# Patient Record
Sex: FEMALE | Race: BLACK OR AFRICAN AMERICAN | NOT HISPANIC OR LATINO | URBAN - METROPOLITAN AREA
[De-identification: names, ages, dates, MRNs, and addresses within clinical notes are randomized per-mention and may not be internally consistent; named-entity substitution may affect disease eponyms.]

---

## 2023-07-11 ENCOUNTER — INPATIENT (INPATIENT)
Facility: HOSPITAL | Age: 43
LOS: 44 days | Discharge: NURSING, REHAB OR LONG-TERM CARE | End: 2023-08-25
Attending: INTERNAL MEDICINE | Admitting: INTERNAL MEDICINE
Payer: MEDICAID

## 2023-07-11 VITALS
SYSTOLIC BLOOD PRESSURE: 137 MMHG | TEMPERATURE: 98 F | OXYGEN SATURATION: 98 % | HEART RATE: 101 BPM | RESPIRATION RATE: 16 BRPM | DIASTOLIC BLOOD PRESSURE: 104 MMHG

## 2023-07-11 PROCEDURE — 99285 EMERGENCY DEPT VISIT HI MDM: CPT

## 2023-07-11 NOTE — ED ADULT TRIAGE NOTE - CHIEF COMPLAINT QUOTE
Pt. brought to [facility] ED by [facility] from home for general weakness of bilateral legs with episodes of falling. Was just diagnosed with neuropathy in March. Was on gabapentin. Pt. fell today. Fell 3 times in last week. No other medical history. Ambulance picked up pt. off of floor. Pt. was on floor for 45 minutes. Pt. feels like soreness in chest. EKG requested at triage. FS 89.

## 2023-07-12 DIAGNOSIS — G62.9 POLYNEUROPATHY, UNSPECIFIED: ICD-10-CM

## 2023-07-12 DIAGNOSIS — R29.898 OTHER SYMPTOMS AND SIGNS INVOLVING THE MUSCULOSKELETAL SYSTEM: ICD-10-CM

## 2023-07-12 DIAGNOSIS — Z29.9 ENCOUNTER FOR PROPHYLACTIC MEASURES, UNSPECIFIED: ICD-10-CM

## 2023-07-12 DIAGNOSIS — M54.9 DORSALGIA, UNSPECIFIED: ICD-10-CM

## 2023-07-12 DIAGNOSIS — R07.9 CHEST PAIN, UNSPECIFIED: ICD-10-CM
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
## 2023-07-12 DIAGNOSIS — E87.6 HYPOKALEMIA: ICD-10-CM

## 2023-07-12 DIAGNOSIS — Z90.49 ACQUIRED ABSENCE OF OTHER SPECIFIED PARTS OF DIGESTIVE TRACT: Chronic | ICD-10-CM

## 2023-07-12 LAB
ALBUMIN SERPL ELPH-MCNC: 3.7 G/DL — SIGNIFICANT CHANGE UP (ref 3.3–5)
ALP SERPL-CCNC: 63 U/L — SIGNIFICANT CHANGE UP (ref 40–120)
ALT FLD-CCNC: 86 U/L — HIGH (ref 4–33)
ANION GAP SERPL CALC-SCNC: 15 MMOL/L — HIGH (ref 7–14)
ANION GAP SERPL CALC-SCNC: 15 MMOL/L — HIGH (ref 7–14)
AST SERPL-CCNC: 159 U/L — HIGH (ref 4–32)
BASOPHILS # BLD AUTO: 0.03 K/UL — SIGNIFICANT CHANGE UP (ref 0–0.2)
BASOPHILS NFR BLD AUTO: 0.7 % — SIGNIFICANT CHANGE UP (ref 0–2)
BILIRUB SERPL-MCNC: 0.9 MG/DL — SIGNIFICANT CHANGE UP (ref 0.2–1.2)
BUN SERPL-MCNC: 8 MG/DL — SIGNIFICANT CHANGE UP (ref 7–23)
BUN SERPL-MCNC: 9 MG/DL — SIGNIFICANT CHANGE UP (ref 7–23)
CALCIUM SERPL-MCNC: 9.2 MG/DL — SIGNIFICANT CHANGE UP (ref 8.4–10.5)
CALCIUM SERPL-MCNC: 9.7 MG/DL — SIGNIFICANT CHANGE UP (ref 8.4–10.5)
CHLORIDE SERPL-SCNC: 96 MMOL/L — LOW (ref 98–107)
CHLORIDE SERPL-SCNC: 99 MMOL/L — SIGNIFICANT CHANGE UP (ref 98–107)
CK SERPL-CCNC: 174 U/L — HIGH (ref 25–170)
CK SERPL-CCNC: 222 U/L — HIGH (ref 25–170)
CO2 SERPL-SCNC: 27 MMOL/L — SIGNIFICANT CHANGE UP (ref 22–31)
CO2 SERPL-SCNC: 31 MMOL/L — SIGNIFICANT CHANGE UP (ref 22–31)
CREAT SERPL-MCNC: 0.53 MG/DL — SIGNIFICANT CHANGE UP (ref 0.5–1.3)
CREAT SERPL-MCNC: 0.6 MG/DL — SIGNIFICANT CHANGE UP (ref 0.5–1.3)
CRP SERPL-MCNC: 10 MG/L — HIGH
EGFR: 114 ML/MIN/1.73M2 — SIGNIFICANT CHANGE UP
EGFR: 118 ML/MIN/1.73M2 — SIGNIFICANT CHANGE UP
EOSINOPHIL # BLD AUTO: 0.04 K/UL — SIGNIFICANT CHANGE UP (ref 0–0.5)
EOSINOPHIL NFR BLD AUTO: 0.9 % — SIGNIFICANT CHANGE UP (ref 0–6)
FOLATE SERPL-MCNC: 7.7 NG/ML — SIGNIFICANT CHANGE UP (ref 3.1–17.5)
GLUCOSE SERPL-MCNC: 102 MG/DL — HIGH (ref 70–99)
GLUCOSE SERPL-MCNC: 90 MG/DL — SIGNIFICANT CHANGE UP (ref 70–99)
HCG SERPL-ACNC: <1 MIU/ML — SIGNIFICANT CHANGE UP
HCT VFR BLD CALC: 34.7 % — SIGNIFICANT CHANGE UP (ref 34.5–45)
HCT VFR BLD CALC: 38.1 % — SIGNIFICANT CHANGE UP (ref 34.5–45)
HGB BLD-MCNC: 11.3 G/DL — LOW (ref 11.5–15.5)
HGB BLD-MCNC: 12.3 G/DL — SIGNIFICANT CHANGE UP (ref 11.5–15.5)
IANC: 2.49 K/UL — SIGNIFICANT CHANGE UP (ref 1.8–7.4)
IMM GRANULOCYTES NFR BLD AUTO: 0.2 % — SIGNIFICANT CHANGE UP (ref 0–0.9)
LYMPHOCYTES # BLD AUTO: 1.47 K/UL — SIGNIFICANT CHANGE UP (ref 1–3.3)
LYMPHOCYTES # BLD AUTO: 32.8 % — SIGNIFICANT CHANGE UP (ref 13–44)
MAGNESIUM SERPL-MCNC: 1.3 MG/DL — LOW (ref 1.6–2.6)
MCHC RBC-ENTMCNC: 28.2 PG — SIGNIFICANT CHANGE UP (ref 27–34)
MCHC RBC-ENTMCNC: 28.9 PG — SIGNIFICANT CHANGE UP (ref 27–34)
MCHC RBC-ENTMCNC: 32.3 GM/DL — SIGNIFICANT CHANGE UP (ref 32–36)
MCHC RBC-ENTMCNC: 32.6 GM/DL — SIGNIFICANT CHANGE UP (ref 32–36)
MCV RBC AUTO: 86.5 FL — SIGNIFICANT CHANGE UP (ref 80–100)
MCV RBC AUTO: 89.6 FL — SIGNIFICANT CHANGE UP (ref 80–100)
MONOCYTES # BLD AUTO: 0.44 K/UL — SIGNIFICANT CHANGE UP (ref 0–0.9)
MONOCYTES NFR BLD AUTO: 9.8 % — SIGNIFICANT CHANGE UP (ref 2–14)
NEUTROPHILS # BLD AUTO: 2.49 K/UL — SIGNIFICANT CHANGE UP (ref 1.8–7.4)
NEUTROPHILS NFR BLD AUTO: 55.6 % — SIGNIFICANT CHANGE UP (ref 43–77)
NRBC # BLD: 0 /100 WBCS — SIGNIFICANT CHANGE UP (ref 0–0)
NRBC # BLD: 0 /100 WBCS — SIGNIFICANT CHANGE UP (ref 0–0)
NRBC # FLD: 0 K/UL — SIGNIFICANT CHANGE UP (ref 0–0)
NRBC # FLD: 0 K/UL — SIGNIFICANT CHANGE UP (ref 0–0)
PHOSPHATE SERPL-MCNC: 3.3 MG/DL — SIGNIFICANT CHANGE UP (ref 2.5–4.5)
PLATELET # BLD AUTO: 271 K/UL — SIGNIFICANT CHANGE UP (ref 150–400)
PLATELET # BLD AUTO: 280 K/UL — SIGNIFICANT CHANGE UP (ref 150–400)
POTASSIUM SERPL-MCNC: 2.9 MMOL/L — CRITICAL LOW (ref 3.5–5.3)
POTASSIUM SERPL-MCNC: 3.2 MMOL/L — LOW (ref 3.5–5.3)
POTASSIUM SERPL-SCNC: 2.9 MMOL/L — CRITICAL LOW (ref 3.5–5.3)
POTASSIUM SERPL-SCNC: 3.2 MMOL/L — LOW (ref 3.5–5.3)
PROT SERPL-MCNC: 6.9 G/DL — SIGNIFICANT CHANGE UP (ref 6–8.3)
RBC # BLD: 4.01 M/UL — SIGNIFICANT CHANGE UP (ref 3.8–5.2)
RBC # BLD: 4.25 M/UL — SIGNIFICANT CHANGE UP (ref 3.8–5.2)
RBC # FLD: 14.7 % — HIGH (ref 10.3–14.5)
RBC # FLD: 14.9 % — HIGH (ref 10.3–14.5)
SODIUM SERPL-SCNC: 141 MMOL/L — SIGNIFICANT CHANGE UP (ref 135–145)
SODIUM SERPL-SCNC: 142 MMOL/L — SIGNIFICANT CHANGE UP (ref 135–145)
TROPONIN T, HIGH SENSITIVITY RESULT: 17 NG/L — SIGNIFICANT CHANGE UP
TROPONIN T, HIGH SENSITIVITY RESULT: 23 NG/L — SIGNIFICANT CHANGE UP
TSH SERPL-MCNC: 2.38 UIU/ML — SIGNIFICANT CHANGE UP (ref 0.27–4.2)
VIT B12 SERPL-MCNC: 401 PG/ML — SIGNIFICANT CHANGE UP (ref 200–900)
WBC # BLD: 3.63 K/UL — LOW (ref 3.8–10.5)
WBC # BLD: 4.48 K/UL — SIGNIFICANT CHANGE UP (ref 3.8–10.5)
WBC # FLD AUTO: 3.63 K/UL — LOW (ref 3.8–10.5)
WBC # FLD AUTO: 4.48 K/UL — SIGNIFICANT CHANGE UP (ref 3.8–10.5)

## 2023-07-12 PROCEDURE — 71045 X-RAY EXAM CHEST 1 VIEW: CPT | Mod: 26

## 2023-07-12 PROCEDURE — 99223 1ST HOSP IP/OBS HIGH 75: CPT | Mod: GC

## 2023-07-12 RX ORDER — POTASSIUM CHLORIDE 20 MEQ
40 PACKET (EA) ORAL ONCE
Refills: 0 | Status: COMPLETED | OUTPATIENT
Start: 2023-07-12 | End: 2023-07-12

## 2023-07-12 RX ORDER — SODIUM CHLORIDE 9 MG/ML
1000 INJECTION INTRAMUSCULAR; INTRAVENOUS; SUBCUTANEOUS
Refills: 0 | Status: DISCONTINUED | OUTPATIENT
Start: 2023-07-12 | End: 2023-07-12

## 2023-07-12 RX ORDER — MAGNESIUM SULFATE 500 MG/ML
1 VIAL (ML) INJECTION ONCE
Refills: 0 | Status: COMPLETED | OUTPATIENT
Start: 2023-07-12 | End: 2023-07-12

## 2023-07-12 RX ORDER — ACETAMINOPHEN 500 MG
975 TABLET ORAL ONCE
Refills: 0 | Status: COMPLETED | OUTPATIENT
Start: 2023-07-12 | End: 2023-07-12

## 2023-07-12 RX ORDER — POTASSIUM CHLORIDE 20 MEQ
10 PACKET (EA) ORAL
Refills: 0 | Status: COMPLETED | OUTPATIENT
Start: 2023-07-12 | End: 2023-07-12

## 2023-07-12 RX ORDER — SODIUM CHLORIDE 9 MG/ML
1000 INJECTION INTRAMUSCULAR; INTRAVENOUS; SUBCUTANEOUS
Refills: 0 | Status: DISCONTINUED | OUTPATIENT
Start: 2023-07-12 | End: 2023-07-15

## 2023-07-12 RX ADMIN — Medication 100 MILLIEQUIVALENT(S): at 07:08

## 2023-07-12 RX ADMIN — Medication 40 MILLIEQUIVALENT(S): at 03:20

## 2023-07-12 RX ADMIN — Medication 975 MILLIGRAM(S): at 04:50

## 2023-07-12 RX ADMIN — Medication 40 MILLIEQUIVALENT(S): at 17:45

## 2023-07-12 RX ADMIN — Medication 100 MILLIEQUIVALENT(S): at 17:45

## 2023-07-12 RX ADMIN — Medication 100 MILLIEQUIVALENT(S): at 19:25

## 2023-07-12 RX ADMIN — Medication 100 MILLIEQUIVALENT(S): at 20:28

## 2023-07-12 RX ADMIN — Medication 975 MILLIGRAM(S): at 02:14

## 2023-07-12 RX ADMIN — Medication 100 MILLIEQUIVALENT(S): at 03:59

## 2023-07-12 RX ADMIN — Medication 100 GRAM(S): at 17:44

## 2023-07-12 RX ADMIN — Medication 100 MILLIEQUIVALENT(S): at 04:55

## 2023-07-12 RX ADMIN — SODIUM CHLORIDE 100 MILLILITER(S): 9 INJECTION INTRAMUSCULAR; INTRAVENOUS; SUBCUTANEOUS at 07:08

## 2023-07-12 NOTE — ED ADULT NURSE NOTE - OBJECTIVE STATEMENT
Break RN note- Patient arrives to the ED for weakness of bilateral legs. Patient diagnosed with peripheral neuropathy in March and was on gabapentin. Patient reports she uses a cane and walker at home. Patient fell today and was on the ground for 45 minutes before EMS arrived to pick her up. Patient reports multiple falls last week. Patient reports her bilateral hands are sensitive to touch with the left hand being more sensitive. No chest pain reported at time of assessment. Patient denies any other pain or injury. Cardiac monitor in place- sinus rhythm. 18g IV placed to right arm. Labs sent a ordered. Patient medicated as ordered. Safety maintained. Patient stable upon exiting the room.

## 2023-07-12 NOTE — PATIENT PROFILE ADULT - FALL HARM RISK - HARM RISK INTERVENTIONS
Assistance with ambulation/Communicate Risk of Fall with Harm to all staff/Discuss with provider need for PT consult/Monitor gait and stability/Reinforce activity limits and safety measures with patient and family/Review medications for side effects contributing to fall risk/Sit up slowly, dangle for a short time, stand at bedside before walking/Tailored Fall Risk Interventions/Visual Cue: Yellow wristband and red socks/Bed in lowest position, wheels locked, appropriate side rails in place/Call bell, personal items and telephone in reach/Instruct patient to call for assistance before getting out of bed or chair/Non-slip footwear when patient is out of bed/[place] to call system/Physically safe environment - no spills, clutter or unnecessary equipment/Purposeful Proactive Rounding/Room/bathroom lighting operational, light cord in reach

## 2023-07-12 NOTE — H&P ADULT - NSHPLABSRESULTS_GEN_ALL_CORE
-XR CHEST PORTABLE URGENT   PROCEDURE DATE:  07/12/2023      INTERPRETATION:  EXAMINATION: XR CHEST URGENT    CLINICAL INDICATION: Chest Pain    TECHNIQUE: Single frontal, portable view of the chest was obtained.    COMPARISON: None    FINDINGS:  The heart is normal in size.  The lungs are clear.  There is no pneumothorax or pleural effusion.    IMPRESSION:  Clear lungs.    --- End of Report ---        -EKG: NSR

## 2023-07-12 NOTE — ED PROVIDER NOTE - MDM ORDERS SUBMITTED SELECTION
Labs/Imaging Studies/Medications Cyclophosphamide Counseling:  I discussed with the patient the risks of cyclophosphamide including but not limited to hair loss, hormonal abnormalities, decreased fertility, abdominal pain, diarrhea, nausea and vomiting, bone marrow suppression and infection. The patient understands that monitoring is required while taking this medication.

## 2023-07-12 NOTE — H&P ADULT - NSHPPHYSICALEXAM_GEN_ALL_CORE
PHYSICAL EXAM:  GENERAL: NAD, lying in bed comfortably  HEAD:  Atraumatic, Normocephalic  EYES: EOMI, PERRLA, conjunctiva and sclera clear  ENT: Moist mucous membranes  NECK: Supple, No JVD  CHEST/LUNG: Clear to auscultation bilaterally; No rales, rhonchi, wheezing, or rubs. Unlabored respirations  HEART: Regular rate and rhythm; No murmurs, rubs, or gallops  ABDOMEN: Bowel sounds present; Soft, Nontender, Nondistended. No hepatomegaly  EXTREMITIES:  2+ Peripheral Pulses, brisk capillary refill. No clubbing, cyanosis, or edema  NERVOUS SYSTEM:  Alert & Oriented X3, speech clear. No deficits.  MSK: FROM all 4 extremities, full and equal strength  SKIN: No rashes or lesions GENERAL: NAD, lying in bed comfortably  HEAD:  Atraumatic, Normocephalic  EYES: EOMI, PERRLA, conjunctiva and sclera clear  ENT: Moist mucous membranes  NECK: Supple, No JVD  CHEST/LUNG: Clear to auscultation bilaterally; No rales, rhonchi, wheezing, or rubs. Unlabored respirations  HEART: Regular rate and rhythm; No murmurs, rubs, or gallops  ABDOMEN: Bowel sounds present; Soft, Nontender, Nondistended. No hepatomegaly  EXTREMITIES:  2+ Peripheral Pulses, brisk capillary refill. No clubbing, cyanosis, or edema  NERVOUS SYSTEM:  Alert & Oriented X3, speech clear. No deficits.  MSK: FROM all 4 extremities, full and equal strength  SKIN: No rashes or lesions GENERAL: NAD, lying in bed comfortably  HEAD:  Atraumatic, Normocephalic  EYES: EOMI, PERRLA, conjunctiva and sclera clear  ENT: Moist mucous membranes  NECK: Supple, No JVD  CHEST/LUNG: Clear to auscultation bilaterally; No rales, rhonchi, wheezing, or rubs. Unlabored respirations  HEART: Regular rate and rhythm; No murmurs, rubs, or gallops  ABDOMEN: Bowel sounds present; Soft, Nontender, Nondistended. No hepatomegaly  EXTREMITIES:  2+ Peripheral Pulses, brisk capillary refill. No clubbing, cyanosis, or edema  NERVOUS SYSTEM:  Alert & Oriented X3, speech clear. CN II-XII intact. LLE sensation decrease, more than RLE. LUE sensation decreased, more than RUE.  MSK: LLE: 3/5 proximal, 4/5 distal     RLE: 4/5 proximal and distal    LUE: 4/5 proximal and distal   RUE 4/5 proximal and distal  SKIN: No rashes or lesions

## 2023-07-12 NOTE — CONSULT NOTE ADULT - SUBJECTIVE AND OBJECTIVE BOX
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 falling down stairs. Since that time, she's experienced flare ups of back pain but no other symptoms. Otherwise, she denies headaches, dizziness, lightheadedness, saddle anesthesia, bowel or bladder incontinence.     She does note intermittent episodes of dull left sided chest pain that does not radiate. This has been occurring for the past 1-2 weeks. It occurs at rest and occurred when history was being obtained. It generally resolves on its own after a few minutes. No associated shortness of breath or palpitations. She does note feeling her heart beating quickly after minimal exertion. She attributes this to her extremity weakness noting that minimal activity requires very high exertion. She also endorses episodes of mid-epigastric burning pain. Otherwise, no fevers, chills, cough, or rash. Does endorse poor PO intake due to decreased appetite for some time now.    NIHSS: 3  preMRS: 2    REVIEW OF SYSTEMS  General: No fevers, chills, fatigue   HEENT: No headaches, acute visual changes,  dysphagia  CVS: +chest pain, +palpitations  Resp: No cough, dyspnea, wheezing  GI: +abdominal pain, no nausea, vomiting, constipation, diarrhea   : No dysuria, frequency   MSK: No joint pain or swelling  Ext: No edema, no claudication  Skin: No rashes   Neuro: +as per HPI  Endocrine: No excessive heat or cold symptoms,   A 10-system ROS was performed and is negative except for those items noted above and/or in the HPI.    PAST MEDICAL & SURGICAL HISTORY:  Peripheral neuropathy  Anemia  Asthma  Sickle cell trait    S/P cholecystectomy    FAMILY HISTORY:    SOCIAL HISTORY:   Lives with: 2 young children.    MEDICATIONS (HOME):  Home Medications:    MEDICATIONS  (STANDING):  potassium chloride  10 mEq/100 mL IVPB 10 milliEquivalent(s) IV Intermittent every 1 hour  sodium chloride 0.9%. 1000 milliLiter(s) (100 mL/Hr) IV Continuous <Continuous>    MEDICATIONS  (PRN):    ALLERGIES/INTOLERANCES:  Allergies  latex (Rash)  No Known Drug Allergies    Intolerances    VITALS & EXAMINATION:  Vital Signs Last 24 Hrs  T(C): 36.6 (12 Jul 2023 12:41), Max: 36.7 (11 Jul 2023 22:00)  T(F): 97.9 (12 Jul 2023 12:41), Max: 98.1 (12 Jul 2023 06:26)  HR: 95 (12 Jul 2023 12:41) (75 - 101)  BP: 139/109 (12 Jul 2023 12:41) (134/103 - 146/100)  BP(mean): --  RR: 20 (12 Jul 2023 12:41) (16 - 20)  SpO2: 100% (12 Jul 2023 12:41) (98% - 100%)    Parameters below as of 12 Jul 2023 12:41  Patient On (Oxygen Delivery Method): room air      General:  Constitutional: Obese Female, appears stated age, in no apparent distress including pain  Head: Normocephalic & atraumatic.  Respiratory: No increased work of breathing at rest  Extremities: No cyanosis, clubbing, or edema.  Skin: No rashes, bruising, or discoloration.    Neurological (>12):  MS: Eyes open, awake, alert, oriented to person, place, situation, time. Normal affect however occasionally tearful. Follows crossed and multistep commands    Language: Speech is clear, fluent with good repetition & comprehension (able to name objects: thumb, glove)    CNs: PERRL (R = 3mm, L = 3mm). VFF. EOMI w/ few beats of fatigable R beating nystagmus, no diplopia. V1-3 intact to LT/PP, well developed masseter muscles b/l. No facial asymmetry b/l, full eye closure strength b/l. Hearing grossly normal (rubbing fingers) b/l. Symmetric palate elevation in midline. Gag reflex deferred. Head turning & shoulder shrug intact b/l. Tongue midline, normal movements, no atrophy.    Motor: Mild thenar atrophy noted on left hand. No noticeable tremor or seizure. No pronator drift. Unclear if exam effort limited at times              Deltoid	Biceps	Triceps	Wrist	Finger ABd	   R	4+	4+	5	4+	4-		4+	  L	4	4	5	4+	4-		4-    	H-Flex	K-Flex	K-Ext	D-Flex	P-Flex  R	4-	4+	4+	4	4+	  L	4-	4+	4+	4	4+	 Arndt test showing pt pushing down on leg to give effort    Sensation: To LT, decreased on LUE and LLE. To PP, pt reports "less but strange sensation" in LUE, in LLE also felt "less but strange shooting sensation through whole body".  Pt reports LUE felt "colder", and unable to describe temperature in b/l LE. Decreased to vibration in b/l fingertips and b/l LE except of R knee. Position not intact in hands and inconsistent in b/l toes.      Cortical: Extinction on DSS (neglect): none    Reflexes:              Biceps(C5)       BR(C6)     Triceps(C7)               Patellar(L4)    Achilles(S1)    Plantar Resp  R	0	          0             0		        0		    0		Down   L	0	          0	             0		        0		    0		Down     Coordination: left hand slower to finger tapping. Dysmetria to FTN on left, unclear if initial dsymetria on RUE. Unable to perform HTS due to weakness    Gait: Pt able to stand with assistance but with some swaying. Unable to assess Romberg and gait to due pt refusal.      LABORATORY:  CBC                       11.3   3.63  )-----------( 271      ( 12 Jul 2023 15:30 )             34.7     Chem 07-12    141  |  99  |  9   ----------------------------<  102<H>  3.2<L>   |  27  |  0.60    Ca    9.2      12 Jul 2023 15:30  Phos  3.3     07-12  Mg     1.30     07-12    TPro  6.9  /  Alb  3.7  /  TBili  0.9  /  DBili  x   /  AST  159<H>  /  ALT  86<H>  /  AlkPhos  63  07-12    LFTs LIVER FUNCTIONS - ( 12 Jul 2023 02:14 )  Alb: 3.7 g/dL / Pro: 6.9 g/dL / ALK PHOS: 63 U/L / ALT: 86 U/L / AST: 159 U/L / GGT: x           Coagulopathy   Lipid Panel   A1c   Cardiac enzymes CARDIAC MARKERS ( 12 Jul 2023 15:30 )  x     / x     / 222 U/L / x     / x      CARDIAC MARKERS ( 12 Jul 2023 02:14 )  x     / x     / 174 U/L / x     / x          U/A Urinalysis Basic - ( 12 Jul 2023 15:30 )    Color: x / Appearance: x / SG: x / pH: x  Gluc: 102 mg/dL / Ketone: x  / Bili: x / Urobili: x   Blood: x / Protein: x / Nitrite: x   Leuk Esterase: x / RBC: x / WBC x   Sq Epi: x / Non Sq Epi: x / Bacteria: x    STUDIES & IMAGING:

## 2023-07-12 NOTE — ED PROVIDER NOTE - CLINICAL SUMMARY MEDICAL DECISION MAKING FREE TEXT BOX
44yo F, hx of peripheral neuropathy, asthma, anemia, presents with bilateral lower extremity weakness for 1-2 weeks and chest pain for a couple of days. Vitals nonactionable. Physical exam significant for no visible deformities, strength 4/5 bilaterally against resistance, good rectal tone, no perineal anesthesia. Concern for electrolyte disturbances vs GBS, low risk for cord compression given history, will obtain basic labs, ck, cxr, ua/uc, reassess.
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
## 2023-07-12 NOTE — H&P ADULT - NSHPREVIEWOFSYSTEMS_GEN_ALL_CORE
Constitutional: [ ] fevers [ ] chills [ ] fatigue  HEENT: [ ] postnasal drip [ ] nasal congestion  CV: [x ] chest pain [ ] orthopnea [ ] LE edema  Resp: [ ] cough [ ] shortness of breath [ ] dyspnea   GI: [ ] nausea [ ] vomiting [ ] diarrhea [ ] constipation [ ] abd pain [ ] melena  : [ ] dysuria [ ] increased urinary frequency  Musculoskeletal: [ ] back pain [ ] myalgias [ ] arthralgias [ ] fracture [x ] weakness in both hands and lower extremities   Skin: [ ] rash [ ] itch  Neurological: [ ] headache [ ] dizziness [ ] syncope [x ] paresthesias in both hands and lower extremities extending proximally to thighs and abdomen  Endocrine: [ ] diabetes [ ] thyroid problem  Hematologic/Lymphatic: [ ] anemia [ ] bleeding problem

## 2023-07-12 NOTE — ED ADULT NURSE NOTE - CHIEF COMPLAINT QUOTE
Pt. brought to [facility] ED by [facility] from home for general weakness of bilateral legs with episodes of falling. Was just diagnosed with neuropathy in March. Was on gabapentin. Pt. fell today. Fell 3 times in last week. No other medical history. Ambulance picked up pt. off of floor. Pt. was on floor for 45 minutes. Pt. feels like soreness in chest. EKG requested at triage. FS 89.

## 2023-07-12 NOTE — PATIENT PROFILE ADULT - FALL HARM RISK - FALLEN IN PAST
Department of Anesthesiology  Postprocedure Note    Patient: Shelli Mathews  MRN: [id number]  YOB: 1985  Date of evaluation: 1/20/2023      Procedure Summary     Date: 01/20/23 Room / Location: [place] [address] 01 / [facility]    Anesthesia Start: 0730 Anesthesia Stop: 6376    Procedure: ROBOTIC CHOLECYSTECTOMY (Abdomen) Diagnosis:       Cholecystitis, unspecified      (CHOLECYSTITIS)    Surgeons: Mareilla Hayes MD Responsible Provider: Connor Perea MD    Anesthesia Type: general ASA Status: 3          Anesthesia Type: No value filed. Amber Phase I: Amber Score: 9    Amber Phase II:        Anesthesia Post Evaluation    Patient location during evaluation: PACU  Patient participation: complete - patient participated  Level of consciousness: awake and alert  Airway patency: patent  Nausea & Vomiting: no nausea and no vomiting  Complications: no  Cardiovascular status: blood pressure returned to baseline  Respiratory status: acceptable  Hydration status: euvolemic  Comments: VSS on transfer to phase 2 recovery. No anesthetic complications. Accidental fall

## 2023-07-12 NOTE — ED PROVIDER NOTE - PHYSICAL EXAMINATION
Physical Exam:  Gen: NAD, AOx3, non-toxic appearing, unable to ambulate  Head: NCAT  HEENT: EOMI, PEERLA, normal conjunctiva, tongue midline, oral mucosa moist  Lung: CTAB, no respiratory distress, no wheezes/rhonchi/rales B/L, speaking in full sentences  CV: RRR, no murmurs, rubs or gallops  Abd: soft, NT, ND, no guarding, no rigidity, no rebound tenderness, no CVA tenderness   MSK: no visible deformities, strength 4/5 bilaterally against resistance   (chaperoned by Dr. Acosta): good rectal tone, no perineal anesthesia  Neuro: No focal sensory or motor deficits  Skin: Warm, well perfused, no rash, no leg swelling  Psych: normal affect, calm Physical Exam:  Gen: NAD, AOx3, non-toxic appearing, unable to ambulate  Head: NCAT  HEENT: EOMI, PEERLA, normal conjunctiva, tongue midline, oral mucosa moist  Lung: CTAB, no respiratory distress, no wheezes/rhonchi/rales B/L, speaking in full sentences  CV: RRR, no murmurs, rubs or gallops  Abd: soft, NT, ND, no guarding, no rigidity, no rebound tenderness, no CVA tenderness   MSK: no visible deformities, strength 4/5 bilaterally against resistance   (chaperoned by Dr. Acosta): good rectal tone, no perineal anesthesia  Neuro: No focal sensory deficits; 4/5 strength b/l LEs  Skin: Warm, well perfused, no rash, no leg swelling  Psych: normal affect, calm

## 2023-07-12 NOTE — H&P ADULT - PROBLEM SELECTOR PLAN 1
Ascending weakness and paresthesias, beginning in 3/2023  Possible Guillan [place] vs CIDP vs vitamin deficiency vs uncontrolled diabetes    Plan:  -Neuro consulted  -Vitamin B1, B6, B9, B12 levels pending  -HIV, syphillis, [place], heavy metal screen pending  -TSH, ESR, CRP pending  -A1c pending  -MRI head and C spine ordered

## 2023-07-12 NOTE — ED ADULT NURSE NOTE - NSFALLRISKINTERV_ED_ALL_ED

## 2023-07-12 NOTE — ED PROVIDER NOTE - OBJECTIVE STATEMENT
44yo F, hx of 44yo F, hx of peripheral neuropathy, asthma, anemia, presents with bilateral lower extremity weakness for 1-2 weeks and chest pain. Progressively worsening to the point she can no longer ambulate and has had 3 falls. Last fall was this evening and patient was unable to rise back up. On the ground for 1 hour, called EMS who brought her to the ED. Also with midsternal intermittent chest pressure for the past couple of days, lasting minutes, nonradiating. Also with back pain and reduced sensation in hands and legs. No saddle anesthesia, fevers, bowel/bladder incontinence/retention, shortness of breath, nausea, vomiting, abdominal pain.

## 2023-07-12 NOTE — H&P ADULT - ASSESSMENT
Ms Perez is a 42yo woman with a hx of peripheral neuropathy, sickle cell trait, asthma, and anemia who presented after a fall with inability to get up due to 4 months of ongoing bilateral lower and upper extremity weakness and paresthesias.

## 2023-07-12 NOTE — H&P ADULT - ATTENDING COMMENTS
Pt with now chronic progressive LE weakness, appears symmetrical, along with paresthesias. Presentation concerning for CIDP or myopathy. Appreciate Neurology consult. F/u metabolic labs and neuraxial imaging.
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
## 2023-07-13 LAB
ALBUMIN SERPL ELPH-MCNC: 3.7 G/DL — SIGNIFICANT CHANGE UP (ref 3.3–5)
ALP SERPL-CCNC: 62 U/L — SIGNIFICANT CHANGE UP (ref 40–120)
ALT FLD-CCNC: 71 U/L — HIGH (ref 4–33)
ANION GAP SERPL CALC-SCNC: 14 MMOL/L — SIGNIFICANT CHANGE UP (ref 7–14)
AST SERPL-CCNC: 117 U/L — HIGH (ref 4–32)
BASOPHILS # BLD AUTO: 0.03 K/UL — SIGNIFICANT CHANGE UP (ref 0–0.2)
BASOPHILS NFR BLD AUTO: 0.8 % — SIGNIFICANT CHANGE UP (ref 0–2)
BILIRUB SERPL-MCNC: 0.7 MG/DL — SIGNIFICANT CHANGE UP (ref 0.2–1.2)
BUN SERPL-MCNC: 8 MG/DL — SIGNIFICANT CHANGE UP (ref 7–23)
CALCIUM SERPL-MCNC: 9.4 MG/DL — SIGNIFICANT CHANGE UP (ref 8.4–10.5)
CHLORIDE SERPL-SCNC: 99 MMOL/L — SIGNIFICANT CHANGE UP (ref 98–107)
CO2 SERPL-SCNC: 26 MMOL/L — SIGNIFICANT CHANGE UP (ref 22–31)
CREAT SERPL-MCNC: 0.57 MG/DL — SIGNIFICANT CHANGE UP (ref 0.5–1.3)
DSDNA AB FLD-ACNC: <0.2 AI — SIGNIFICANT CHANGE UP
EGFR: 116 ML/MIN/1.73M2 — SIGNIFICANT CHANGE UP
ENA SS-A AB FLD IA-ACNC: <0.2 AI — SIGNIFICANT CHANGE UP
EOSINOPHIL # BLD AUTO: 0.07 K/UL — SIGNIFICANT CHANGE UP (ref 0–0.5)
EOSINOPHIL NFR BLD AUTO: 1.9 % — SIGNIFICANT CHANGE UP (ref 0–6)
GLUCOSE SERPL-MCNC: 94 MG/DL — SIGNIFICANT CHANGE UP (ref 70–99)
HCT VFR BLD CALC: 37.8 % — SIGNIFICANT CHANGE UP (ref 34.5–45)
HCYS SERPL-MCNC: 26.7 UMOL/L — HIGH
HERPES SIMPLEX VIRUS 1/2 SURVEILLANCE PCR RESULT: SIGNIFICANT CHANGE UP
HERPES SIMPLEX VIRUS 1/2 SURVEILLANCE PCR SOURCE: SIGNIFICANT CHANGE UP
HGB BLD-MCNC: 12.2 G/DL — SIGNIFICANT CHANGE UP (ref 11.5–15.5)
HSV1+2 DNA SPEC QL NAA+PROBE: SIGNIFICANT CHANGE UP
IANC: 2.14 K/UL — SIGNIFICANT CHANGE UP (ref 1.8–7.4)
IMM GRANULOCYTES NFR BLD AUTO: 0.3 % — SIGNIFICANT CHANGE UP (ref 0–0.9)
LYMPHOCYTES # BLD AUTO: 1.17 K/UL — SIGNIFICANT CHANGE UP (ref 1–3.3)
LYMPHOCYTES # BLD AUTO: 31.7 % — SIGNIFICANT CHANGE UP (ref 13–44)
MAGNESIUM SERPL-MCNC: 1.7 MG/DL — SIGNIFICANT CHANGE UP (ref 1.6–2.6)
MCHC RBC-ENTMCNC: 28.6 PG — SIGNIFICANT CHANGE UP (ref 27–34)
MCHC RBC-ENTMCNC: 32.3 GM/DL — SIGNIFICANT CHANGE UP (ref 32–36)
MCV RBC AUTO: 88.5 FL — SIGNIFICANT CHANGE UP (ref 80–100)
MONOCYTES # BLD AUTO: 0.27 K/UL — SIGNIFICANT CHANGE UP (ref 0–0.9)
MONOCYTES NFR BLD AUTO: 7.3 % — SIGNIFICANT CHANGE UP (ref 2–14)
NEUTROPHILS # BLD AUTO: 2.14 K/UL — SIGNIFICANT CHANGE UP (ref 1.8–7.4)
NEUTROPHILS NFR BLD AUTO: 58 % — SIGNIFICANT CHANGE UP (ref 43–77)
NRBC # BLD: 0 /100 WBCS — SIGNIFICANT CHANGE UP (ref 0–0)
NRBC # FLD: 0 K/UL — SIGNIFICANT CHANGE UP (ref 0–0)
PHOSPHATE SERPL-MCNC: 3 MG/DL — SIGNIFICANT CHANGE UP (ref 2.5–4.5)
PLATELET # BLD AUTO: 291 K/UL — SIGNIFICANT CHANGE UP (ref 150–400)
POTASSIUM SERPL-MCNC: 3.6 MMOL/L — SIGNIFICANT CHANGE UP (ref 3.5–5.3)
POTASSIUM SERPL-SCNC: 3.6 MMOL/L — SIGNIFICANT CHANGE UP (ref 3.5–5.3)
PROT SERPL-MCNC: 7 G/DL — SIGNIFICANT CHANGE UP (ref 6–8.3)
RBC # BLD: 4.27 M/UL — SIGNIFICANT CHANGE UP (ref 3.8–5.2)
RBC # FLD: 15.1 % — HIGH (ref 10.3–14.5)
RHEUMATOID FACT SERPL-ACNC: <10 IU/ML — SIGNIFICANT CHANGE UP (ref 0–13)
SODIUM SERPL-SCNC: 139 MMOL/L — SIGNIFICANT CHANGE UP (ref 135–145)
T3 SERPL-MCNC: 120 NG/DL — SIGNIFICANT CHANGE UP (ref 80–200)
T4 AB SER-ACNC: 6.74 UG/DL — SIGNIFICANT CHANGE UP (ref 5.1–13)
WBC # BLD: 3.69 K/UL — LOW (ref 3.8–10.5)
WBC # FLD AUTO: 3.69 K/UL — LOW (ref 3.8–10.5)

## 2023-07-13 PROCEDURE — 99223 1ST HOSP IP/OBS HIGH 75: CPT

## 2023-07-13 PROCEDURE — 99233 SBSQ HOSP IP/OBS HIGH 50: CPT | Mod: GC

## 2023-07-13 RX ORDER — ENOXAPARIN SODIUM 100 MG/ML
40 INJECTION SUBCUTANEOUS EVERY 12 HOURS
Refills: 0 | Status: DISCONTINUED | OUTPATIENT
Start: 2023-07-13 | End: 2023-07-13

## 2023-07-13 RX ORDER — ENOXAPARIN SODIUM 100 MG/ML
40 INJECTION SUBCUTANEOUS EVERY 12 HOURS
Refills: 0 | Status: DISCONTINUED | OUTPATIENT
Start: 2023-07-13 | End: 2023-07-23

## 2023-07-13 RX ORDER — ENOXAPARIN SODIUM 100 MG/ML
40 INJECTION SUBCUTANEOUS EVERY 24 HOURS
Refills: 0 | Status: DISCONTINUED | OUTPATIENT
Start: 2023-07-13 | End: 2023-07-13

## 2023-07-13 RX ADMIN — ENOXAPARIN SODIUM 40 MILLIGRAM(S): 100 INJECTION SUBCUTANEOUS at 17:37
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
## 2023-07-13 NOTE — PHYSICAL THERAPY INITIAL EVALUATION ADULT - GROSSLY INTACT, SENSORY
decreased sensation left UE and LE, pt reports she feels light touch but cannot describe what she is feeling, feels hypersensitive

## 2023-07-13 NOTE — PHYSICAL THERAPY INITIAL EVALUATION ADULT - ADDITIONAL COMMENTS
Pt lives with 2 children in an apartment with ramp access. Pt uses a rollator in the community and was independent with ADLs prior. Pt reported 5 falls in the past 6 months, requiring assistance to stand up. Pt reports numbness and tingling in B LE and UE.   Pt left semi supine in bed in NAD, all lines intact, call sam in reach and TELLO Delcid made aware.

## 2023-07-13 NOTE — PROGRESS NOTE ADULT - PROBLEM SELECTOR PLAN 1
Ascending weakness and paresthesias, beginning in 3/2023  Possible Guillan [place] vs CIDP vs vitamin deficiency vs uncontrolled diabetes    Plan:  -Neuro consulted  -Extensive labs ordered and pending  -EMG per neuro, MRI not needed at this time Ascending weakness and paresthesias, beginning in 3/2023  Possible Guillain [place] vs CIDP vs vitamin deficiency vs uncontrolled diabetes    Plan:  -Neuro consulted  -Extensive labs ordered and pending  -EMG per neuro, MRI not needed at this time

## 2023-07-13 NOTE — CHART NOTE - NSCHARTNOTEFT_GEN_A_CORE
Patient seen and examined at bedside with attending and neurology team. Please refer to attending attestation of neurology consult note from the day prior for final recommendations.    Clinical exam suggestive of CIDP. No MRI at this time. Will try to obtain EMG/NCS inpatient today or tomorrow Patient seen and examined at bedside with attending and neurology team. Please refer to attending attestation of neurology consult note from the day prior for final recommendations.    Clinical exam suggestive of CIDP. No MRI indicated at this time.  Thank you

## 2023-07-13 NOTE — PROGRESS NOTE ADULT - ASSESSMENT
Ms Perez is a 42yo woman with a hx of peripheral neuropathy, sickle cell trait, asthma, and anemia who presented after a fall with inability to get up due to 4 months of ongoing bilateral lower and upper extremity weakness and paresthesias. Admitted for further workup. Neuro on board.

## 2023-07-13 NOTE — PROGRESS NOTE ADULT - SUBJECTIVE AND OBJECTIVE BOX
PROGRESS NOTE:   Authored by Julia Alexandra MD PGY-1  Internal Medicine      Patient is a 43y old  Female who presents with a chief complaint of Weakness in lower and upper extremities (12 Jul 2023 20:21)      SUBJECTIVE / OVERNIGHT EVENTS: ***, patient seen and examined at bedside    MEDICATIONS  (STANDING):  enoxaparin Injectable 40 milliGRAM(s) SubCutaneous every 12 hours  sodium chloride 0.9%. 1000 milliLiter(s) (100 mL/Hr) IV Continuous <Continuous>    MEDICATIONS  (PRN):      CAPILLARY BLOOD GLUCOSE        I&O's Summary    12 Jul 2023 07:01  -  13 Jul 2023 07:00  --------------------------------------------------------  IN: 0 mL / OUT: 300 mL / NET: -300 mL        PHYSICAL EXAM:  Vital Signs Last 24 Hrs  T(C): 36.8 (13 Jul 2023 05:15), Max: 36.8 (13 Jul 2023 05:15)  T(F): 98.2 (13 Jul 2023 05:15), Max: 98.2 (13 Jul 2023 05:15)  HR: 99 (13 Jul 2023 05:15) (93 - 99)  BP: 152/99 (13 Jul 2023 05:15) (140/85 - 152/99)  RR: 19 (13 Jul 2023 05:15) (19 - 19)  SpO2: 100% (13 Jul 2023 05:15) (100% - 100%)    Parameters below as of 13 Jul 2023 05:15  Patient On (Oxygen Delivery Method): room air      CONSTITUTIONAL: Well-groomed, in no apparent distress  RESPIRATORY: Breathing comfortably; no dullness to percussion; lungs CTA without wheeze/rhonchi/rales  CARDIOVASCULAR: +S1S2, RRR, no M/G/R; pedal pulses full and symmetric; no lower extremity edema  GASTROINTESTINAL: No palpable masses or tenderness, +BS throughout, no rebound/guarding; no hepatosplenomegaly; no hernia palpated  SKIN: No rashes or ulcers noted  NEUROLOGIC: A+O x 3, CN II-XII intact; sensation intact in LEs b/l to light touch    LABS:                        12.2   3.69  )-----------( 291      ( 13 Jul 2023 07:00 )             37.8     07-13    139  |  99  |  8   ----------------------------<  94  3.6   |  26  |  0.57    Ca    9.4      13 Jul 2023 07:00  Phos  3.0     07-13  Mg     1.70     07-13    TPro  7.0  /  Alb  3.7  /  TBili  0.7  /  DBili  x   /  AST  117<H>  /  ALT  71<H>  /  AlkPhos  62  07-13     PROGRESS NOTE:   Authored by Julia Alexandra MD PGY-1  Internal Medicine      Patient is a 43y old  Female who presents with a chief complaint of Weakness in lower and upper extremities (12 Jul 2023 20:21)      SUBJECTIVE / OVERNIGHT EVENTS: NAEO. Pt continues to endorse the same symptoms as presentation. She was seen and examined at bedside    MEDICATIONS  (STANDING):  enoxaparin Injectable 40 milliGRAM(s) SubCutaneous every 12 hours  sodium chloride 0.9%. 1000 milliLiter(s) (100 mL/Hr) IV Continuous <Continuous>    MEDICATIONS  (PRN):      PHYSICAL EXAM:  Vital Signs Last 24 Hrs  T(C): 36.8 (13 Jul 2023 05:15), Max: 36.8 (13 Jul 2023 05:15)  T(F): 98.2 (13 Jul 2023 05:15), Max: 98.2 (13 Jul 2023 05:15)  HR: 99 (13 Jul 2023 05:15) (93 - 99)  BP: 152/99 (13 Jul 2023 05:15) (140/85 - 152/99)  RR: 19 (13 Jul 2023 05:15) (19 - 19)  SpO2: 100% (13 Jul 2023 05:15) (100% - 100%)    Parameters below as of 13 Jul 2023 05:15  Patient On (Oxygen Delivery Method): room air      Physical Exam:   GENERAL: NAD, lying in bed comfortably  CHEST/LUNG: Clear to auscultation bilaterally; No rales, rhonchi, wheezing, or rubs. Unlabored respirations  HEART: Regular rate and rhythm; No murmurs, rubs, or gallops  ABDOMEN: Bowel sounds present; Soft, Nontender, Nondistended. No hepatomegaly  EXTREMITIES:  2+ Peripheral Pulses, brisk capillary refill. No clubbing, cyanosis, or edema  NERVOUS SYSTEM:  Alert & Oriented X3, speech clear. CN II-XII intact. LLE sensation decrease, more than RLE. LUE sensation decreased, more than RUE.  MSK: LLE: 3/5 proximal, 4/5 distal     RLE: 4/5 proximal and distal    LUE: 4/5 proximal and distal   RUE 4/5 proximal and distal  SKIN: No rashes or lesions      LABS:                        12.2   3.69  )-----------( 291      ( 13 Jul 2023 07:00 )             37.8     07-13    139  |  99  |  8   ----------------------------<  94  3.6   |  26  |  0.57    Ca    9.4      13 Jul 2023 07:00  Phos  3.0     07-13  Mg     1.70     07-13    TPro  7.0  /  Alb  3.7  /  TBili  0.7  /  DBili  x   /  AST  117<H>  /  ALT  71<H>  /  AlkPhos  62  07-13

## 2023-07-13 NOTE — PROGRESS NOTE ADULT - ATTENDING COMMENTS
Pt reports symptoms similar today. No respiratory symptoms. Concern for CIDP. F/u EMG. Appreciate Neurology recs.

## 2023-07-14 LAB
ALBUMIN SERPL ELPH-MCNC: 3.5 G/DL — SIGNIFICANT CHANGE UP (ref 3.3–5)
ALP SERPL-CCNC: 58 U/L — SIGNIFICANT CHANGE UP (ref 40–120)
ALT FLD-CCNC: 62 U/L — HIGH (ref 4–33)
ANA TITR SER: NEGATIVE — SIGNIFICANT CHANGE UP
ANION GAP SERPL CALC-SCNC: 11 MMOL/L — SIGNIFICANT CHANGE UP (ref 7–14)
AST SERPL-CCNC: 116 U/L — HIGH (ref 4–32)
BASOPHILS # BLD AUTO: 0.02 K/UL — SIGNIFICANT CHANGE UP (ref 0–0.2)
BASOPHILS NFR BLD AUTO: 0.5 % — SIGNIFICANT CHANGE UP (ref 0–2)
BILIRUB SERPL-MCNC: 0.8 MG/DL — SIGNIFICANT CHANGE UP (ref 0.2–1.2)
BUN SERPL-MCNC: 8 MG/DL — SIGNIFICANT CHANGE UP (ref 7–23)
CALCIUM SERPL-MCNC: 9.6 MG/DL — SIGNIFICANT CHANGE UP (ref 8.4–10.5)
CHLORIDE SERPL-SCNC: 102 MMOL/L — SIGNIFICANT CHANGE UP (ref 98–107)
CK SERPL-CCNC: 243 U/L — HIGH (ref 25–170)
CO2 SERPL-SCNC: 25 MMOL/L — SIGNIFICANT CHANGE UP (ref 22–31)
CREAT SERPL-MCNC: 0.53 MG/DL — SIGNIFICANT CHANGE UP (ref 0.5–1.3)
CULTURE RESULTS: SIGNIFICANT CHANGE UP
EGFR: 118 ML/MIN/1.73M2 — SIGNIFICANT CHANGE UP
EOSINOPHIL # BLD AUTO: 0.13 K/UL — SIGNIFICANT CHANGE UP (ref 0–0.5)
EOSINOPHIL NFR BLD AUTO: 3.1 % — SIGNIFICANT CHANGE UP (ref 0–6)
GLUCOSE SERPL-MCNC: 94 MG/DL — SIGNIFICANT CHANGE UP (ref 70–99)
HCT VFR BLD CALC: 37.4 % — SIGNIFICANT CHANGE UP (ref 34.5–45)
HGB BLD-MCNC: 12.2 G/DL — SIGNIFICANT CHANGE UP (ref 11.5–15.5)
IANC: 2.17 K/UL — SIGNIFICANT CHANGE UP (ref 1.8–7.4)
IMM GRANULOCYTES NFR BLD AUTO: 0.2 % — SIGNIFICANT CHANGE UP (ref 0–0.9)
LYMPHOCYTES # BLD AUTO: 1.62 K/UL — SIGNIFICANT CHANGE UP (ref 1–3.3)
LYMPHOCYTES # BLD AUTO: 38.3 % — SIGNIFICANT CHANGE UP (ref 13–44)
MAGNESIUM SERPL-MCNC: 2 MG/DL — SIGNIFICANT CHANGE UP (ref 1.6–2.6)
MCHC RBC-ENTMCNC: 28.6 PG — SIGNIFICANT CHANGE UP (ref 27–34)
MCHC RBC-ENTMCNC: 32.6 GM/DL — SIGNIFICANT CHANGE UP (ref 32–36)
MCV RBC AUTO: 87.8 FL — SIGNIFICANT CHANGE UP (ref 80–100)
MONOCYTES # BLD AUTO: 0.28 K/UL — SIGNIFICANT CHANGE UP (ref 0–0.9)
MONOCYTES NFR BLD AUTO: 6.6 % — SIGNIFICANT CHANGE UP (ref 2–14)
NEUTROPHILS # BLD AUTO: 2.17 K/UL — SIGNIFICANT CHANGE UP (ref 1.8–7.4)
NEUTROPHILS NFR BLD AUTO: 51.3 % — SIGNIFICANT CHANGE UP (ref 43–77)
NRBC # BLD: 0 /100 WBCS — SIGNIFICANT CHANGE UP (ref 0–0)
NRBC # FLD: 0 K/UL — SIGNIFICANT CHANGE UP (ref 0–0)
PHOSPHATE SERPL-MCNC: 3.3 MG/DL — SIGNIFICANT CHANGE UP (ref 2.5–4.5)
PLATELET # BLD AUTO: 262 K/UL — SIGNIFICANT CHANGE UP (ref 150–400)
POTASSIUM SERPL-MCNC: 4.1 MMOL/L — SIGNIFICANT CHANGE UP (ref 3.5–5.3)
POTASSIUM SERPL-SCNC: 4.1 MMOL/L — SIGNIFICANT CHANGE UP (ref 3.5–5.3)
PROT SERPL-MCNC: 7 G/DL — SIGNIFICANT CHANGE UP (ref 6–8.3)
RBC # BLD: 4.26 M/UL — SIGNIFICANT CHANGE UP (ref 3.8–5.2)
RBC # FLD: 15.2 % — HIGH (ref 10.3–14.5)
SODIUM SERPL-SCNC: 138 MMOL/L — SIGNIFICANT CHANGE UP (ref 135–145)
SPECIMEN SOURCE: SIGNIFICANT CHANGE UP
WBC # BLD: 4.23 K/UL — SIGNIFICANT CHANGE UP (ref 3.8–10.5)
WBC # FLD AUTO: 4.23 K/UL — SIGNIFICANT CHANGE UP (ref 3.8–10.5)

## 2023-07-14 PROCEDURE — 99233 SBSQ HOSP IP/OBS HIGH 50: CPT | Mod: GC

## 2023-07-14 RX ADMIN — ENOXAPARIN SODIUM 40 MILLIGRAM(S): 100 INJECTION SUBCUTANEOUS at 05:47

## 2023-07-14 RX ADMIN — ENOXAPARIN SODIUM 40 MILLIGRAM(S): 100 INJECTION SUBCUTANEOUS at 17:22

## 2023-07-14 NOTE — PROGRESS NOTE ADULT - SUBJECTIVE AND OBJECTIVE BOX
PROGRESS NOTE:   Authored by Julia Alexandra MD PGY-1  Internal Medicine      Patient is a 43y old  Female who presents with a chief complaint of Weakness in lower and upper extremities (13 Jul 2023 14:30)      SUBJECTIVE / OVERNIGHT EVENTS: NAEO. Pt endorses improvement in her LLE weakness today. No new complaints or symptoms. Pt was seen and examined at bedside    MEDICATIONS  (STANDING):  enoxaparin Injectable 40 milliGRAM(s) SubCutaneous every 12 hours  sodium chloride 0.9%. 1000 milliLiter(s) (100 mL/Hr) IV Continuous <Continuous>        I&O's Summary    13 Jul 2023 07:01  -  14 Jul 2023 07:00  --------------------------------------------------------  IN: 0 mL / OUT: 500 mL / NET: -500 mL        PHYSICAL EXAM:  Vital Signs Last 24 Hrs  T(C): 36.9 (14 Jul 2023 05:46), Max: 36.9 (14 Jul 2023 05:46)  T(F): 98.4 (14 Jul 2023 05:46), Max: 98.4 (14 Jul 2023 05:46)  HR: 93 (14 Jul 2023 05:46) (93 - 107)  BP: 159/111 (14 Jul 2023 05:46) (136/101 - 159/111)  RR: 19 (14 Jul 2023 05:46) (19 - 19)  SpO2: 99% (14 Jul 2023 05:46) (99% - 100%)    Parameters below as of 13 Jul 2023 20:45  Patient On (Oxygen Delivery Method): room air      CONSTITUTIONAL: Well-groomed, in no apparent distress  RESPIRATORY: Breathing comfortably; no dullness to percussion; lungs CTA without wheeze/rhonchi/rales  CARDIOVASCULAR: +S1S2, RRR, no M/G/R; pedal pulses full and symmetric; no lower extremity edema  GASTROINTESTINAL: No palpable masses or tenderness, +BS throughout, no rebound/guarding; no hepatosplenomegaly; no hernia palpated  NEUROLOGIC: A+O x 3, CN II-XII intact; lower and upper extremity weakness and sensation deficits    LABS:                        12.2   4.23  )-----------( 262      ( 14 Jul 2023 06:42 )             37.4     07-14    138  |  102  |  8   ----------------------------<  94  4.1   |  25  |  0.53    Ca    9.6      14 Jul 2023 06:42  Phos  3.3     07-14  Mg     2.00     07-14    TPro  7.0  /  Alb  3.5  /  TBili  0.8  /  DBili  x   /  AST  116<H>  /  ALT  62<H>  /  AlkPhos  58  07-14

## 2023-07-14 NOTE — PROGRESS NOTE ADULT - ASSESSMENT
Ms Perez is a 42yo woman with a hx of peripheral neuropathy, sickle cell trait, asthma, and anemia who was admitted after suffering a fall with inability to get up due to 4 months of ongoing bilateral lower and upper extremity weakness and paresthesias. Admitted for further workup. Neuro on board. EMG pending.

## 2023-07-14 NOTE — PROGRESS NOTE ADULT - ATTENDING COMMENTS
Pt reports mild improvement in leg weakness today, otherwise clinically consistently with the past few days. Need to expedite EMG to confirm suspected diagnosis of CIDP or alternative, in order to start therapy, as pt still cannot ambulate. Will coordinate with Neurology.

## 2023-07-14 NOTE — PROGRESS NOTE ADULT - PROBLEM SELECTOR PLAN 1
Ascending weakness and paresthesias, beginning in 3/2023  Possible CIDP vs Guillan [place] vs vitamin deficiency vs uncontrolled diabetes    Plan:  -Awaiting EMG and further recs from neuro  -Extensive labs ordered and pending

## 2023-07-14 NOTE — PROGRESS NOTE ADULT - PROBLEM SELECTOR PLAN 2
Unlikely ACS, EKG NSR, trop (-)  Endorses mid epigastric burning pain    Plan:  -PPI  -continue to reassess

## 2023-07-15 DIAGNOSIS — R74.01 ELEVATION OF LEVELS OF LIVER TRANSAMINASE LEVELS: ICD-10-CM

## 2023-07-15 LAB
ALBUMIN SERPL ELPH-MCNC: 3.7 G/DL — SIGNIFICANT CHANGE UP (ref 3.3–5)
ALP SERPL-CCNC: 60 U/L — SIGNIFICANT CHANGE UP (ref 40–120)
ALT FLD-CCNC: 57 U/L — HIGH (ref 4–33)
ANION GAP SERPL CALC-SCNC: 9 MMOL/L — SIGNIFICANT CHANGE UP (ref 7–14)
AST SERPL-CCNC: 105 U/L — HIGH (ref 4–32)
BASOPHILS # BLD AUTO: 0.02 K/UL — SIGNIFICANT CHANGE UP (ref 0–0.2)
BASOPHILS NFR BLD AUTO: 0.5 % — SIGNIFICANT CHANGE UP (ref 0–2)
BILIRUB SERPL-MCNC: 0.7 MG/DL — SIGNIFICANT CHANGE UP (ref 0.2–1.2)
BUN SERPL-MCNC: 10 MG/DL — SIGNIFICANT CHANGE UP (ref 7–23)
CALCIUM SERPL-MCNC: 9.7 MG/DL — SIGNIFICANT CHANGE UP (ref 8.4–10.5)
CHLORIDE SERPL-SCNC: 105 MMOL/L — SIGNIFICANT CHANGE UP (ref 98–107)
CO2 SERPL-SCNC: 24 MMOL/L — SIGNIFICANT CHANGE UP (ref 22–31)
CREAT SERPL-MCNC: 0.52 MG/DL — SIGNIFICANT CHANGE UP (ref 0.5–1.3)
EGFR: 118 ML/MIN/1.73M2 — SIGNIFICANT CHANGE UP
EOSINOPHIL # BLD AUTO: 0.14 K/UL — SIGNIFICANT CHANGE UP (ref 0–0.5)
EOSINOPHIL NFR BLD AUTO: 3.4 % — SIGNIFICANT CHANGE UP (ref 0–6)
GLUCOSE SERPL-MCNC: 87 MG/DL — SIGNIFICANT CHANGE UP (ref 70–99)
HCT VFR BLD CALC: 40.6 % — SIGNIFICANT CHANGE UP (ref 34.5–45)
HGB BLD-MCNC: 12.8 G/DL — SIGNIFICANT CHANGE UP (ref 11.5–15.5)
IANC: 1.91 K/UL — SIGNIFICANT CHANGE UP (ref 1.8–7.4)
IMM GRANULOCYTES NFR BLD AUTO: 0.7 % — SIGNIFICANT CHANGE UP (ref 0–0.9)
LYMPHOCYTES # BLD AUTO: 1.77 K/UL — SIGNIFICANT CHANGE UP (ref 1–3.3)
LYMPHOCYTES # BLD AUTO: 42.5 % — SIGNIFICANT CHANGE UP (ref 13–44)
MAGNESIUM SERPL-MCNC: 1.8 MG/DL — SIGNIFICANT CHANGE UP (ref 1.6–2.6)
MCHC RBC-ENTMCNC: 28.8 PG — SIGNIFICANT CHANGE UP (ref 27–34)
MCHC RBC-ENTMCNC: 31.5 GM/DL — LOW (ref 32–36)
MCV RBC AUTO: 91.4 FL — SIGNIFICANT CHANGE UP (ref 80–100)
MONOCYTES # BLD AUTO: 0.29 K/UL — SIGNIFICANT CHANGE UP (ref 0–0.9)
MONOCYTES NFR BLD AUTO: 7 % — SIGNIFICANT CHANGE UP (ref 2–14)
NEUTROPHILS # BLD AUTO: 1.91 K/UL — SIGNIFICANT CHANGE UP (ref 1.8–7.4)
NEUTROPHILS NFR BLD AUTO: 45.9 % — SIGNIFICANT CHANGE UP (ref 43–77)
NRBC # BLD: 0 /100 WBCS — SIGNIFICANT CHANGE UP (ref 0–0)
NRBC # FLD: 0 K/UL — SIGNIFICANT CHANGE UP (ref 0–0)
PHOSPHATE SERPL-MCNC: 3.5 MG/DL — SIGNIFICANT CHANGE UP (ref 2.5–4.5)
PLATELET # BLD AUTO: 232 K/UL — SIGNIFICANT CHANGE UP (ref 150–400)
POTASSIUM SERPL-MCNC: 3.9 MMOL/L — SIGNIFICANT CHANGE UP (ref 3.5–5.3)
POTASSIUM SERPL-SCNC: 3.9 MMOL/L — SIGNIFICANT CHANGE UP (ref 3.5–5.3)
PROT SERPL-MCNC: 7.1 G/DL — SIGNIFICANT CHANGE UP (ref 6–8.3)
PYRIDOXAL PHOS SERPL-MCNC: 4.2 UG/L — SIGNIFICANT CHANGE UP (ref 3.4–65.2)
RBC # BLD: 4.44 M/UL — SIGNIFICANT CHANGE UP (ref 3.8–5.2)
RBC # FLD: 15.2 % — HIGH (ref 10.3–14.5)
SODIUM SERPL-SCNC: 138 MMOL/L — SIGNIFICANT CHANGE UP (ref 135–145)
T PALLIDUM AB TITR SER: NEGATIVE — SIGNIFICANT CHANGE UP
WBC # BLD: 4.16 K/UL — SIGNIFICANT CHANGE UP (ref 3.8–10.5)
WBC # FLD AUTO: 4.16 K/UL — SIGNIFICANT CHANGE UP (ref 3.8–10.5)

## 2023-07-15 PROCEDURE — 99233 SBSQ HOSP IP/OBS HIGH 50: CPT | Mod: 25

## 2023-07-15 PROCEDURE — 99232 SBSQ HOSP IP/OBS MODERATE 35: CPT | Mod: GC

## 2023-07-15 PROCEDURE — 95885 MUSC TST DONE W/NERV TST LIM: CPT | Mod: 26

## 2023-07-15 PROCEDURE — 95911 NRV CNDJ TEST 9-10 STUDIES: CPT | Mod: 26

## 2023-07-15 RX ORDER — IBUPROFEN 200 MG
400 TABLET ORAL ONCE
Refills: 0 | Status: COMPLETED | OUTPATIENT
Start: 2023-07-15 | End: 2023-07-15

## 2023-07-15 RX ORDER — GABAPENTIN 400 MG/1
300 CAPSULE ORAL ONCE
Refills: 0 | Status: COMPLETED | OUTPATIENT
Start: 2023-07-15 | End: 2023-07-15

## 2023-07-15 RX ORDER — ACETAMINOPHEN 500 MG
650 TABLET ORAL ONCE
Refills: 0 | Status: DISCONTINUED | OUTPATIENT
Start: 2023-07-15 | End: 2023-07-15

## 2023-07-15 RX ADMIN — Medication 25 MILLIGRAM(S): at 12:34

## 2023-07-15 RX ADMIN — Medication 400 MILLIGRAM(S): at 10:06

## 2023-07-15 RX ADMIN — Medication 400 MILLIGRAM(S): at 11:06

## 2023-07-15 RX ADMIN — ENOXAPARIN SODIUM 40 MILLIGRAM(S): 100 INJECTION SUBCUTANEOUS at 04:52

## 2023-07-15 RX ADMIN — ENOXAPARIN SODIUM 40 MILLIGRAM(S): 100 INJECTION SUBCUTANEOUS at 17:24

## 2023-07-15 NOTE — PROGRESS NOTE ADULT - SUBJECTIVE AND OBJECTIVE BOX
No new c/o.   Painful paresthesias.  Started on pregabalin - no side effects.  She took gabapentin in the past - 300 TID which caused her to feel out of it and sluggish and did not help the pain.      Vital Signs Last 24 Hrs  T(C): 36.9 (15 Jul 2023 14:35), Max: 37 (14 Jul 2023 20:46)  T(F): 98.4 (15 Jul 2023 14:35), Max: 98.6 (14 Jul 2023 20:46)  HR: 97 (15 Jul 2023 14:35) (85 - 97)  BP: 137/90 (15 Jul 2023 14:35) (137/90 - 140/93)  BP(mean): --  RR: 16 (15 Jul 2023 18:16) (16 - 18)  SpO2: 96% (15 Jul 2023 18:16) (91% - 100%)    Parameters below as of 15 Jul 2023 18:16  Patient On (Oxygen Delivery Method): room air      Motor exam:  NE 5  NF 5  R/L  Deltoid 4+/4+  Biceps 4+/4+  Triceps 4/4  WE 4+/4  WF 4+-->5, B  FF 5/4+  FE 4+/4  FAbd 4/4-  HF 4-/4-  KE 4+/4+  KF 4-/4-  DF 4+/4+  PF 5/5  EHL 4+/4-    Reflexes absent throughout.    Sensory: Decreased vibration in the R MM and absent in the L knee  Proprioception: decreased in the left fingers and left knee.  Slightly slow in the right toe. PP variable.  Minimal sensory "ataxia" in left arm.       EMG/NCS performed today - full report in paper chart.  This is a significantly limited study due to 60Hz interference in all of the sensory NCSs and left median F-wave.  Therefore, it is not possible to confirm or exclude a sensory polyneuropathy.  There is no electrophysiologic evidence of a motor polyradiculoneuropathy - these findings  are not consistent with a diagnosis of CIDP. 6r No new c/o.   Painful paresthesias.  Started on pregabalin - no side effects.  She took gabapentin in the past - 300 TID which caused her to feel out of it and sluggish and did not help the pain.      Vital Signs Last 24 Hrs  T(C): 36.9 (15 Jul 2023 14:35), Max: 37 (14 Jul 2023 20:46)  T(F): 98.4 (15 Jul 2023 14:35), Max: 98.6 (14 Jul 2023 20:46)  HR: 97 (15 Jul 2023 14:35) (85 - 97)  BP: 137/90 (15 Jul 2023 14:35) (137/90 - 140/93)  BP(mean): --  RR: 16 (15 Jul 2023 18:16) (16 - 18)  SpO2: 96% (15 Jul 2023 18:16) (91% - 100%)    Parameters below as of 15 Jul 2023 18:16  Patient On (Oxygen Delivery Method): room air      Motor exam:  NE 5  NF 5  R/L  Deltoid 4+/4+  Biceps 4+/4+  Triceps 4/4  WE 4+/4  WF 4+-->5, B  FF 5/4+  FE 4+/4  FAbd 4/4-  HF 4-/4-  KE 4+/4+  KF 4-/4-  DF 4+/4+  PF 5/5  EHL 4+/4-    Reflexes absent throughout.    Sensory: Decreased vibration in the R MM and absent in the L knee  Proprioception: decreased in the left fingers and left knee.  Slightly slow in the right toe. PP variable.  Minimal sensory "ataxia" in left arm.  Variable sensory loss to PP and cold.  No sensory level to PP.    EMG/NCS performed today - full report in paper chart.  This is a significantly limited study due to 60Hz interference in all of the sensory NCSs and left median F-wave.  Therefore, it is not possible to confirm or exclude a sensory polyneuropathy.  There is no electrophysiologic evidence of a motor polyradiculoneuropathy - these findings  are not consistent with a diagnosis of CIDP. 6r No new c/o.   Painful paresthesias.  Started on pregabalin - no side effects.  She took gabapentin in the past - 300 TID which caused her to feel out of it and sluggish and did not help the pain.      Vital Signs Last 24 Hrs  T(C): 36.9 (15 Jul 2023 14:35), Max: 37 (14 Jul 2023 20:46)  T(F): 98.4 (15 Jul 2023 14:35), Max: 98.6 (14 Jul 2023 20:46)  HR: 97 (15 Jul 2023 14:35) (85 - 97)  BP: 137/90 (15 Jul 2023 14:35) (137/90 - 140/93)  BP(mean): --  RR: 16 (15 Jul 2023 18:16) (16 - 18)  SpO2: 96% (15 Jul 2023 18:16) (91% - 100%)    Parameters below as of 15 Jul 2023 18:16  Patient On (Oxygen Delivery Method): room air      Motor exam:  NE 5  NF 5  R/L  Deltoid 4+/4+  Biceps 4+/4+  Triceps 4/4  WE 4+/4  WF 4+-->5, B  FF 5/4+  FE 4+/4  FAbd 4/4-  HF 4-/4-  KE 4+/4+  KF 4-/4-  DF 4+/4+  PF 5/5  EHL 4+/4-    Reflexes absent throughout.    Sensory: Decreased vibration in the R MM and absent in the L knee  Proprioception: decreased in the left fingers and left knee.  Slightly slow in the right toe. PP variable.  Minimal sensory "ataxia" in left arm.  Variable sensory loss to PP and cold.  No sensory level to PP.    EMG/NCS performed today - full report in paper chart.  This is a significantly limited study due to 60Hz interference in all of the sensory NCSs and left median F-wave.  Therefore, it is not possible to confirm or exclude a sensory polyneuropathy.  There is no electrophysiologic evidence of a motor polyradiculoneuropathy - these findings  are not consistent with a diagnosis of CIDP.

## 2023-07-15 NOTE — PROGRESS NOTE ADULT - SUBJECTIVE AND OBJECTIVE BOX
PROGRESS NOTE:   Authored by ZITA Saldana PGY2 Pager: [facility] [phone] [facility] [phone]    Patient is a 43y old  Female who presents with a chief complaint of Weakness in lower and upper extremities (14 Jul 2023 10:07)      SUBJECTIVE / OVERNIGHT EVENTS:  NAEON.    MEDICATIONS  (STANDING):  enoxaparin Injectable 40 milliGRAM(s) SubCutaneous every 12 hours  sodium chloride 0.9%. 1000 milliLiter(s) (100 mL/Hr) IV Continuous <Continuous>    MEDICATIONS  (PRN):      CAPILLARY BLOOD GLUCOSE        I&O's Summary      PHYSICAL EXAM:  Vital Signs Last 24 Hrs  T(C): 36.6 (15 Jul 2023 05:21), Max: 37 (14 Jul 2023 20:46)  T(F): 97.8 (15 Jul 2023 05:21), Max: 98.6 (14 Jul 2023 20:46)  HR: 85 (15 Jul 2023 05:21) (85 - 96)  BP: 140/93 (15 Jul 2023 05:21) (137/99 - 140/93)  BP(mean): --  RR: 17 (15 Jul 2023 05:21) (17 - 18)  SpO2: 100% (15 Jul 2023 05:21) (98% - 100%)    Parameters below as of 15 Jul 2023 05:21  Patient On (Oxygen Delivery Method): room air      GENERAL: NAD  CHEST/LUNG: Clear to auscultation bilaterally  HEART: Regular rate and rhythm; No murmurs, rubs, or gallops  ABDOMEN: Bowel sounds present; Soft, Nontender, Nondistended  NERVOUS SYSTEM:  Alert & Oriented X3, speech clear. CN II-XII intact. LLE sensation decrease, more than RLE. LUE sensation decreased, more than RUE.  MSK: LLE: 3/5 proximal, 4/5 distal     RLE: 4/5 proximal and distal    LUE: 4/5 proximal and distal   RUE 4/5 proximal and distal  SKIN: No rashes or lesions      LABS:                        12.2   4.23  )-----------( 262      ( 14 Jul 2023 06:42 )             37.4     07-14    138  |  102  |  8   ----------------------------<  94  4.1   |  25  |  0.53    Ca    9.6      14 Jul 2023 06:42  Phos  3.3     07-14  Mg     2.00     07-14    TPro  7.0  /  Alb  3.5  /  TBili  0.8  /  DBili  x   /  AST  116<H>  /  ALT  62<H>  /  AlkPhos  58  07-14      CARDIAC MARKERS ( 14 Jul 2023 06:42 )  x     / x     / 243 U/L / x     / x          Urinalysis Basic - ( 14 Jul 2023 06:42 )    Color: x / Appearance: x / SG: x / pH: x  Gluc: 94 mg/dL / Ketone: x  / Bili: x / Urobili: x   Blood: x / Protein: x / Nitrite: x   Leuk Esterase: x / RBC: x / WBC x   Sq Epi: x / Non Sq Epi: x / Bacteria: x        Culture - Urine (collected 12 Jul 2023 18:53)  Source: Clean Catch Clean Catch (Midstream)  Final Report (14 Jul 2023 16:28):    >=3 organisms. Probable collection contamination.        RADIOLOGY & ADDITIONAL TESTS:  Results Reviewed:   Imaging Personally Reviewed:  Electrocardiogram Personally Reviewed:    COORDINATION OF CARE:  Care Discussed with Consultants/Other Providers [Y/N]:  Prior or Outpatient Records Reviewed [Y/N]:   PROGRESS NOTE:   Authored by ZITA Saldana PGY2 Pager: [facility] [phone] [facility] [phone]    Patient is a 43y old  Female who presents with a chief complaint of Weakness in lower and upper extremities (14 Jul 2023 10:07)      SUBJECTIVE / OVERNIGHT EVENTS:  NAEON. Contnued pain- started on Lyrica 25.     MEDICATIONS  (STANDING):  enoxaparin Injectable 40 milliGRAM(s) SubCutaneous every 12 hours  sodium chloride 0.9%. 1000 milliLiter(s) (100 mL/Hr) IV Continuous <Continuous>    MEDICATIONS  (PRN):      CAPILLARY BLOOD GLUCOSE        I&O's Summary      PHYSICAL EXAM:  Vital Signs Last 24 Hrs  T(C): 36.6 (15 Jul 2023 05:21), Max: 37 (14 Jul 2023 20:46)  T(F): 97.8 (15 Jul 2023 05:21), Max: 98.6 (14 Jul 2023 20:46)  HR: 85 (15 Jul 2023 05:21) (85 - 96)  BP: 140/93 (15 Jul 2023 05:21) (137/99 - 140/93)  BP(mean): --  RR: 17 (15 Jul 2023 05:21) (17 - 18)  SpO2: 100% (15 Jul 2023 05:21) (98% - 100%)    Parameters below as of 15 Jul 2023 05:21  Patient On (Oxygen Delivery Method): room air      GENERAL: NAD  CHEST/LUNG: Clear to auscultation bilaterally  HEART: Regular rate and rhythm; No murmurs, rubs, or gallops  ABDOMEN: Bowel sounds present; Soft, Nontender, Nondistended  NERVOUS SYSTEM:  Alert & Oriented X3, speech clear. CN II-XII intact. LLE sensation decrease, more than RLE. LUE sensation decreased, more than RUE.  MSK: LLE: 3/5 proximal, 4/5 distal     RLE: 4/5 proximal and distal    LUE: 4/5 proximal and distal   RUE 4/5 proximal and distal  SKIN: No rashes or lesions      LABS:                        12.2   4.23  )-----------( 262      ( 14 Jul 2023 06:42 )             37.4     07-14    138  |  102  |  8   ----------------------------<  94  4.1   |  25  |  0.53    Ca    9.6      14 Jul 2023 06:42  Phos  3.3     07-14  Mg     2.00     07-14    TPro  7.0  /  Alb  3.5  /  TBili  0.8  /  DBili  x   /  AST  116<H>  /  ALT  62<H>  /  AlkPhos  58  07-14      CARDIAC MARKERS ( 14 Jul 2023 06:42 )  x     / x     / 243 U/L / x     / x          Urinalysis Basic - ( 14 Jul 2023 06:42 )    Color: x / Appearance: x / SG: x / pH: x  Gluc: 94 mg/dL / Ketone: x  / Bili: x / Urobili: x   Blood: x / Protein: x / Nitrite: x   Leuk Esterase: x / RBC: x / WBC x   Sq Epi: x / Non Sq Epi: x / Bacteria: x        Culture - Urine (collected 12 Jul 2023 18:53)  Source: Clean Catch Clean Catch (Midstream)  Final Report (14 Jul 2023 16:28):    >=3 organisms. Probable collection contamination.        RADIOLOGY & ADDITIONAL TESTS:  Results Reviewed:   Imaging Personally Reviewed:  Electrocardiogram Personally Reviewed:    COORDINATION OF CARE:  Care Discussed with Consultants/Other Providers [Y/N]:  Prior or Outpatient Records Reviewed [Y/N]:

## 2023-07-15 NOTE — PROGRESS NOTE ADULT - ASSESSMENT
Ms Perez is a 44yo woman with a hx of peripheral neuropathy, sickle cell trait, asthma, and anemia who was admitted after suffering a fall with inability to get up due to 4 months of ongoing bilateral lower and upper extremity weakness and paresthesias. Admitted for further workup. Neuro on board. EMG pending.

## 2023-07-15 NOTE — PROGRESS NOTE ADULT - ATTENDING COMMENTS
43F with a hx of peripheral neuropathy, sickle cell trait, asthma, and anemia who was admitted after suffering a fall with inability to get up due to 4 months of ongoing bilateral lower and upper extremity weakness and paresthesias.    Patient seen and examined, tearful and reports that she is having a bad day, has burning and shoot sensation in feet and hands. Tried gabapentin and toradol in past without relief. Agree to trying Lyrica. Also noted to have elevated LFTs, will check Abd US and HIV/hepatitis panel in AM. She is still pending EMG, neuro is following and concern is for CIDP.

## 2023-07-15 NOTE — PROGRESS NOTE ADULT - PROBLEM SELECTOR PLAN 1
Ascending weakness and paresthesias, beginning in 3/2023  Possible CIDP vs Guillan [place] vs vitamin deficiency vs uncontrolled diabetes    Plan:  -Awaiting EMG and further recs from neuro  -Extensive labs ordered and pending Ascending weakness and paresthesias, beginning in 3/2023  Possible CIDP vs Guillan [place] vs vitamin deficiency vs uncontrolled diabetes    Plan:  -Awaiting EMG and further recs from neuro  -Extensive labs ordered and pending  - lyrica 20 ordered 7/15
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
## 2023-07-15 NOTE — PROGRESS NOTE ADULT - ASSESSMENT
Ms. Perez is a 44 yo woman with progressively worsening painful paresthesias and weakness.  She has distal and proximal weakness and areflexia.    Ms. Perez is a 44 yo woman with 8 months of progressively worsening painful paresthesias and weakness.    She has distal and proximal weakness and areflexia.  Clinically there is involvement of both pain/temp and vibr/proprioception modalities.    Since there is areflexia and no sensory level and no UMN signs, the neuroanatomical localization for the weakness was more consistent with peripheral involvement and not a myelopathy.  This type of pattern of weakness is seen with CIDP but the motor nerve conduction studies are not consistent with this diagnosis as the cause of the weakness.   The character of her sensory symptoms and areflexia are more consistent with a large and small fiber sensory polyneuropathy but due to artifacts it is not possible to confirm or exclude a large fiber sensory polyneuropathy with the EMG/NCS.  A small fiber polyneuropathy can never be assessed with NCSs.  An outpatient NCS is needed to assess for a large fiber sensory polyneuropathy.   Since there is uncertainty regarding the diagnosis and the NCSs cannot explain the weakness, we should do an MRI brain, C, T and LS spine w/wo gado.     For neuropathic pain increase pregabalin to 50 mg BID for one day then 100 mg BID.     We should do an extensive work up for etiologies of a polyneuropathy.    Ms. Perez is a 44 yo woman with 8 months of progressively worsening painful paresthesias and weakness.    She has distal and proximal weakness and areflexia.  Clinically there is involvement of both pain/temp and vibr/proprioception modalities.    Since there is areflexia and no sensory level and no UMN signs, the neuroanatomical localization for the weakness was more consistent with peripheral involvement and not a myelopathy.  This type of pattern of weakness is seen with CIDP but the motor nerve conduction studies are not consistent with this diagnosis as the cause of the weakness.   The character of her sensory symptoms and areflexia are more consistent with a large and small fiber sensory polyneuropathy but due to artifacts it is not possible to confirm or exclude a large fiber sensory polyneuropathy with the EMG/NCS.  A small fiber polyneuropathy can never be assessed with NCSs.  An outpatient NCS is needed to assess for a large fiber sensory polyneuropathy.   Since there is uncertainty regarding the diagnosis and the NCSs cannot explain the weakness, there could be a combination of a polyneuropathy and a CNS lesion - we should do an MRI brain, C, T and LS spine w/wo gado.     For neuropathic pain increase pregabalin to 50 mg BID for one day then 100 mg BID.     We should do an extensive work up for etiologies of a polyneuropathy.   [place]ora of these may have been sent and are pending:  methylmalonic acid, anti-intrinsic factor Ab, urine calcium, hepatitis panel, cryoglobulins, syphilis screen, vitamin B1, ACE level, SPEP, UPEP, immunofixation electrophoresis - serum and urine, ANCA, Anti-gliadin Ab, anti-tissue transglutaminase Ab, Lyme, HgbA1c, serum free light chaines, ds-DNA, RNP, Sm Ab.   D/W neurology resident.   Thank you

## 2023-07-15 NOTE — PROGRESS NOTE ADULT - PROBLEM SELECTOR PLAN 3
No hemorrhage on CTH  No bruising  Left knee pain    Plan:  -Tylenol prn - pending RUQ US  - HIV and acute hep panel pending

## 2023-07-16 DIAGNOSIS — I10 ESSENTIAL (PRIMARY) HYPERTENSION: ICD-10-CM

## 2023-07-16 LAB
A1C WITH ESTIMATED AVERAGE GLUCOSE RESULT: 5 % — SIGNIFICANT CHANGE UP (ref 4–5.6)
ALBUMIN SERPL ELPH-MCNC: 3.5 G/DL — SIGNIFICANT CHANGE UP (ref 3.3–5)
ALP SERPL-CCNC: 62 U/L — SIGNIFICANT CHANGE UP (ref 40–120)
ALT FLD-CCNC: 49 U/L — HIGH (ref 4–33)
ANION GAP SERPL CALC-SCNC: 13 MMOL/L — SIGNIFICANT CHANGE UP (ref 7–14)
ANION GAP SERPL CALC-SCNC: 18 MMOL/L — HIGH (ref 7–14)
ANTI-RIBONUCLEAR PROTEIN: <0.2 AI — SIGNIFICANT CHANGE UP
AST SERPL-CCNC: 83 U/L — HIGH (ref 4–32)
AUTO DIFF PNL BLD: NEGATIVE — SIGNIFICANT CHANGE UP
BASOPHILS # BLD AUTO: 0.03 K/UL — SIGNIFICANT CHANGE UP (ref 0–0.2)
BASOPHILS NFR BLD AUTO: 0.8 % — SIGNIFICANT CHANGE UP (ref 0–2)
BILIRUB SERPL-MCNC: 0.5 MG/DL — SIGNIFICANT CHANGE UP (ref 0.2–1.2)
BUN SERPL-MCNC: 14 MG/DL — SIGNIFICANT CHANGE UP (ref 7–23)
C-ANCA SER-ACNC: NEGATIVE — SIGNIFICANT CHANGE UP
CALCIUM SERPL-MCNC: 9.4 MG/DL — SIGNIFICANT CHANGE UP (ref 8.4–10.5)
CALCIUM UR-MCNC: 17 MG/DL — SIGNIFICANT CHANGE UP
CHLORIDE SERPL-SCNC: 101 MMOL/L — SIGNIFICANT CHANGE UP (ref 98–107)
CHLORIDE SERPL-SCNC: 104 MMOL/L — SIGNIFICANT CHANGE UP (ref 98–107)
CO2 SERPL-SCNC: 21 MMOL/L — LOW (ref 22–31)
CO2 SERPL-SCNC: 23 MMOL/L — SIGNIFICANT CHANGE UP (ref 22–31)
CREAT SERPL-MCNC: 0.55 MG/DL — SIGNIFICANT CHANGE UP (ref 0.5–1.3)
EGFR: 117 ML/MIN/1.73M2 — SIGNIFICANT CHANGE UP
EOSINOPHIL # BLD AUTO: 0.16 K/UL — SIGNIFICANT CHANGE UP (ref 0–0.5)
EOSINOPHIL NFR BLD AUTO: 4.3 % — SIGNIFICANT CHANGE UP (ref 0–6)
ESTIMATED AVERAGE GLUCOSE: 97 — SIGNIFICANT CHANGE UP
GLUCOSE SERPL-MCNC: 106 MG/DL — HIGH (ref 70–99)
HAV IGM SER-ACNC: SIGNIFICANT CHANGE UP
HBV CORE IGM SER-ACNC: SIGNIFICANT CHANGE UP
HBV SURFACE AG SER-ACNC: SIGNIFICANT CHANGE UP
HCT VFR BLD CALC: 36.9 % — SIGNIFICANT CHANGE UP (ref 34.5–45)
HCV AB S/CO SERPL IA: 0.1 S/CO — SIGNIFICANT CHANGE UP (ref 0–0.99)
HCV AB SERPL-IMP: SIGNIFICANT CHANGE UP
HGB BLD-MCNC: 11.8 G/DL — SIGNIFICANT CHANGE UP (ref 11.5–15.5)
IANC: 1.92 K/UL — SIGNIFICANT CHANGE UP (ref 1.8–7.4)
IMM GRANULOCYTES NFR BLD AUTO: 0.5 % — SIGNIFICANT CHANGE UP (ref 0–0.9)
LYMPHOCYTES # BLD AUTO: 1.21 K/UL — SIGNIFICANT CHANGE UP (ref 1–3.3)
LYMPHOCYTES # BLD AUTO: 32.7 % — SIGNIFICANT CHANGE UP (ref 13–44)
MAGNESIUM SERPL-MCNC: 1.7 MG/DL — SIGNIFICANT CHANGE UP (ref 1.6–2.6)
MCHC RBC-ENTMCNC: 28.6 PG — SIGNIFICANT CHANGE UP (ref 27–34)
MCHC RBC-ENTMCNC: 32 GM/DL — SIGNIFICANT CHANGE UP (ref 32–36)
MCV RBC AUTO: 89.6 FL — SIGNIFICANT CHANGE UP (ref 80–100)
MONOCYTES # BLD AUTO: 0.36 K/UL — SIGNIFICANT CHANGE UP (ref 0–0.9)
MONOCYTES NFR BLD AUTO: 9.7 % — SIGNIFICANT CHANGE UP (ref 2–14)
NEUTROPHILS # BLD AUTO: 1.92 K/UL — SIGNIFICANT CHANGE UP (ref 1.8–7.4)
NEUTROPHILS NFR BLD AUTO: 52 % — SIGNIFICANT CHANGE UP (ref 43–77)
NRBC # BLD: 0 /100 WBCS — SIGNIFICANT CHANGE UP (ref 0–0)
NRBC # FLD: 0 K/UL — SIGNIFICANT CHANGE UP (ref 0–0)
P-ANCA SER-ACNC: NEGATIVE — SIGNIFICANT CHANGE UP
PHOSPHATE SERPL-MCNC: 3.4 MG/DL — SIGNIFICANT CHANGE UP (ref 2.5–4.5)
PLATELET # BLD AUTO: 214 K/UL — SIGNIFICANT CHANGE UP (ref 150–400)
POTASSIUM SERPL-MCNC: 3.8 MMOL/L — SIGNIFICANT CHANGE UP (ref 3.5–5.3)
POTASSIUM SERPL-MCNC: 4 MMOL/L — SIGNIFICANT CHANGE UP (ref 3.5–5.3)
POTASSIUM SERPL-SCNC: 3.8 MMOL/L — SIGNIFICANT CHANGE UP (ref 3.5–5.3)
POTASSIUM SERPL-SCNC: 4 MMOL/L — SIGNIFICANT CHANGE UP (ref 3.5–5.3)
PROT ?TM UR-MCNC: 15 MG/DL — SIGNIFICANT CHANGE UP
PROT SERPL-MCNC: 7 G/DL — SIGNIFICANT CHANGE UP (ref 6–8.3)
PROT SERPL-MCNC: 7.4 G/DL — SIGNIFICANT CHANGE UP (ref 6–8.3)
RBC # BLD: 4.12 M/UL — SIGNIFICANT CHANGE UP (ref 3.8–5.2)
RBC # FLD: 15 % — HIGH (ref 10.3–14.5)
SODIUM SERPL-SCNC: 138 MMOL/L — SIGNIFICANT CHANGE UP (ref 135–145)
SODIUM SERPL-SCNC: 142 MMOL/L — SIGNIFICANT CHANGE UP (ref 135–145)
WBC # BLD: 3.7 K/UL — LOW (ref 3.8–10.5)
WBC # FLD AUTO: 3.7 K/UL — LOW (ref 3.8–10.5)

## 2023-07-16 PROCEDURE — 99232 SBSQ HOSP IP/OBS MODERATE 35: CPT

## 2023-07-16 PROCEDURE — 86335 IMMUNFIX E-PHORSIS/URINE/CSF: CPT | Mod: 26

## 2023-07-16 PROCEDURE — 99233 SBSQ HOSP IP/OBS HIGH 50: CPT | Mod: GC

## 2023-07-16 PROCEDURE — 84165 PROTEIN E-PHORESIS SERUM: CPT | Mod: 26

## 2023-07-16 PROCEDURE — 76705 ECHO EXAM OF ABDOMEN: CPT | Mod: 26

## 2023-07-16 PROCEDURE — 84166 PROTEIN E-PHORESIS/URINE/CSF: CPT | Mod: 26

## 2023-07-16 PROCEDURE — 86334 IMMUNOFIX E-PHORESIS SERUM: CPT | Mod: 26

## 2023-07-16 RX ORDER — THIAMINE MONONITRATE (VIT B1) 100 MG
500 TABLET ORAL EVERY 8 HOURS
Refills: 0 | Status: DISCONTINUED | OUTPATIENT
Start: 2023-07-16 | End: 2023-07-16

## 2023-07-16 RX ORDER — DIPHENHYDRAMINE HCL 50 MG
25 CAPSULE ORAL EVERY 6 HOURS
Refills: 0 | Status: DISCONTINUED | OUTPATIENT
Start: 2023-07-16 | End: 2023-08-19

## 2023-07-16 RX ORDER — AMLODIPINE BESYLATE 2.5 MG/1
5 TABLET ORAL DAILY
Refills: 0 | Status: DISCONTINUED | OUTPATIENT
Start: 2023-07-16 | End: 2023-08-19

## 2023-07-16 RX ADMIN — ENOXAPARIN SODIUM 40 MILLIGRAM(S): 100 INJECTION SUBCUTANEOUS at 17:52

## 2023-07-16 RX ADMIN — Medication 25 MILLIGRAM(S): at 15:15

## 2023-07-16 RX ADMIN — Medication 105 MILLIGRAM(S): at 13:47

## 2023-07-16 RX ADMIN — AMLODIPINE BESYLATE 5 MILLIGRAM(S): 2.5 TABLET ORAL at 15:45

## 2023-07-16 RX ADMIN — Medication 50 MILLIGRAM(S): at 17:51

## 2023-07-16 RX ADMIN — Medication 50 MILLIGRAM(S): at 09:10

## 2023-07-16 RX ADMIN — ENOXAPARIN SODIUM 40 MILLIGRAM(S): 100 INJECTION SUBCUTANEOUS at 05:47

## 2023-07-16 NOTE — PROGRESS NOTE ADULT - PROBLEM SELECTOR PLAN 1
Ascending weakness and paresthesias, beginning in 3/2023  Possible CIDP vs Guillan [place] vs vitamin deficiency vs uncontrolled diabetes    Plan:  -Awaiting EMG and further recs from neuro  -Extensive labs ordered and pending  -Lyrica 20 ordered 7/15 Ascending weakness and paresthesias, beginning in 3/2023  Possible CIDP vs Guillan [place] vs vitamin deficiency vs uncontrolled diabetes  EMG: No electrophysiologic evidence of a motor polyradiculoneuropathy - findings are not consistent with a diagnosis of CIDP    Plan:  -MR head, c/t/l spine  -Extensive labs ordered and pending  -Lyrica 20 ordered 7/15

## 2023-07-16 NOTE — PROGRESS NOTE ADULT - ASSESSMENT
Ms. Perez is a 44 yo woman with 8 months of progressively worsening painful paresthesias and weakness.    She has distal and proximal weakness and areflexia.  Clinically there is involvement of both pain/temp and vibr/proprioception modalities.    Since there is areflexia and no sensory level and no UMN signs, the neuroanatomical localization for the weakness was more consistent with peripheral involvement and not a myelopathy.  This type of pattern of weakness is seen with CIDP but the motor nerve conduction studies are not consistent with this diagnosis as the cause of the weakness.   The character of her sensory symptoms and areflexia are more consistent with a large and small fiber sensory polyneuropathy but due to artifacts it is not possible to confirm or exclude a large fiber sensory polyneuropathy with the EMG/NCS.  A small fiber polyneuropathy can never be assessed with NCSs.  An outpatient NCS is needed to assess for a large fiber sensory polyneuropathy.   Since there is uncertainty regarding the diagnosis and the NCSs cannot explain the weakness, there could be a combination of a polyneuropathy and a CNS lesion - we should do an MRI brain, C, T and LS spine w/wo gado.     For neuropathic pain continue pregabalin 50 mg BID for one day then 100 mg BID.     We should do an extensive work up for etiologies of a polyneuropathy.   Bora of these may have been sent and are pending:  methylmalonic acid, anti-intrinsic factor Ab, urine calcium, hepatitis panel, cryoglobulins, syphilis screen, vitamin B1, ACE level, SPEP, UPEP, immunofixation electrophoresis - serum and urine, ANCA, Anti-gliadin Ab, anti-tissue transglutaminase Ab, Lyme, HgbA1c, serum free light chaines, ds-DNA, RNP, Sm Ab.   D/W patient.  Thank you

## 2023-07-16 NOTE — PROGRESS NOTE ADULT - SUBJECTIVE AND OBJECTIVE BOX
PROGRESS NOTE:   Authored by Julia Alexandra MD PGY-1  Internal Medicine      Patient is a 43y old  Female who presents with a chief complaint of Weakness in lower and upper extremities (15 Jul 2023 19:30)      SUBJECTIVE / OVERNIGHT EVENTS: ***, patient seen and examined at bedside    MEDICATIONS  (STANDING):  enoxaparin Injectable 40 milliGRAM(s) SubCutaneous every 12 hours  pregabalin 25 milliGRAM(s) Oral daily    MEDICATIONS  (PRN):      CAPILLARY BLOOD GLUCOSE        I&O's Summary    15 Jul 2023 07:01  -  16 Jul 2023 07:00  --------------------------------------------------------  IN: 0 mL / OUT: 900 mL / NET: -900 mL        PHYSICAL EXAM:  Vital Signs Last 24 Hrs  T(C): 36.8 (16 Jul 2023 06:30), Max: 36.9 (15 Jul 2023 14:35)  T(F): 98.3 (16 Jul 2023 06:30), Max: 98.4 (15 Jul 2023 14:35)  HR: 85 (16 Jul 2023 06:30) (85 - 97)  BP: 150/109 (16 Jul 2023 06:30) (137/90 - 150/109)  RR: 16 (16 Jul 2023 06:30) (16 - 17)  SpO2: 100% (16 Jul 2023 06:30) (91% - 100%)    Parameters below as of 16 Jul 2023 06:30  Patient On (Oxygen Delivery Method): room air      CHEST/LUNG: Clear to auscultation bilaterally  HEART: Regular rate and rhythm; No murmurs, rubs, or gallops  ABDOMEN: Bowel sounds present; Soft, Nontender, Nondistended  NERVOUS SYSTEM:  Alert & Oriented X3, speech clear. CN II-XII intact. LLE sensation decrease, more than RLE. LUE sensation decreased, more than RUE.  MSK: LLE: 3/5 proximal, 4/5 distal     RLE: 4/5 proximal and distal    LUE: 4/5 proximal and distal   RUE 4/5 proximal and distal  SKIN: No rashes or lesions      LABS:                        12.8   4.16  )-----------( 232      ( 15 Jul 2023 06:52 )             40.6     07-15    138  |  105  |  10  ----------------------------<  87  3.9   |  24  |  0.52    Ca    9.7      15 Jul 2023 06:52  Phos  3.5     07-15  Mg     1.80     07-15    TPro  7.1  /  Alb  3.7  /  TBili  0.7  /  DBili  x   /  AST  105<H>  /  ALT  57<H>  /  AlkPhos  60  07-15

## 2023-07-16 NOTE — PROGRESS NOTE ADULT - SUBJECTIVE AND OBJECTIVE BOX
No new c/o.    Vital Signs Last 24 Hrs  T(C): 36.8 (16 Jul 2023 06:30), Max: 36.9 (15 Jul 2023 14:35)  T(F): 98.3 (16 Jul 2023 06:30), Max: 98.4 (15 Jul 2023 14:35)  HR: 85 (16 Jul 2023 06:30) (85 - 97)  BP: 150/109 (16 Jul 2023 06:30) (137/90 - 150/109)  BP(mean): --  RR: 16 (16 Jul 2023 06:30) (16 - 17)  SpO2: 100% (16 Jul 2023 06:30) (91% - 100%)    Parameters below as of 16 Jul 2023 06:30  Patient On (Oxygen Delivery Method): room air    Exam unchanged compared to 7/15/23

## 2023-07-16 NOTE — PROGRESS NOTE ADULT - ASSESSMENT
Ms Perez is a 44yo woman with a hx of peripheral neuropathy, sickle cell trait, asthma, and anemia who was admitted after suffering a fall with inability to get up due to 4 months of ongoing bilateral lower and upper extremity weakness and paresthesias. Admitted for further workup. Neuro on board. MRI head, C/T/L spine pending. Ms Perez is a 42yo woman with a hx of peripheral neuropathy, sickle cell trait, asthma, and anemia who was admitted after suffering a fall with inability to get up due to 4 months of ongoing bilateral lower and upper extremity weakness and paresthesias. Admitted for further workup. Neuro on board. No evidence of motor polyradiculoneuropathy on EMG. MRI head, C/T/L spine pending.
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
## 2023-07-16 NOTE — PROGRESS NOTE ADULT - ATTENDING COMMENTS
43F with a hx of peripheral neuropathy, sickle cell trait, asthma, and anemia who was admitted after suffering a fall with inability to get up due to 4 months of ongoing bilateral lower and upper extremity weakness and paresthesias, now with inability to ambulate     Patient seen and examined, reports she feels better today in regards to symptoms compared to yesterday. Spoke with neurology team and aware of plan for MRI of brain and spine.     In regards to paresthesias and weakness, Neuro following, s/p EMG yesterday and motor nerve conduction studies are not consistent with CIDP as the cause of the weakness. Will obtain MRI brain and whole spine per neuro recs + labs. Will start on empiric high dose thiamine as patient does reports ETOH use (3x/week). Increased pregabalin per neuro recs.     In regards to elevated LFTs, Abd US w/ hypoechoic liver lesions, will clarify with radiology if CT A/P vs. MRI abd is preferred for further evaluation.

## 2023-07-17 LAB
ACE SERPL-CCNC: 49 U/L — SIGNIFICANT CHANGE UP (ref 14–82)
ALBUMIN SERPL ELPH-MCNC: 3.6 G/DL — SIGNIFICANT CHANGE UP (ref 3.3–5)
ALP SERPL-CCNC: 58 U/L — SIGNIFICANT CHANGE UP (ref 40–120)
ALT FLD-CCNC: 48 U/L — HIGH (ref 4–33)
ANION GAP SERPL CALC-SCNC: 11 MMOL/L — SIGNIFICANT CHANGE UP (ref 7–14)
AST SERPL-CCNC: 89 U/L — HIGH (ref 4–32)
B BURGDOR C6 AB SER-ACNC: NEGATIVE — SIGNIFICANT CHANGE UP
B BURGDOR IGG+IGM SER-ACNC: 0.1 INDEX — SIGNIFICANT CHANGE UP (ref 0.01–0.89)
BASOPHILS # BLD AUTO: 0.03 K/UL — SIGNIFICANT CHANGE UP (ref 0–0.2)
BASOPHILS NFR BLD AUTO: 0.8 % — SIGNIFICANT CHANGE UP (ref 0–2)
BILIRUB SERPL-MCNC: 0.6 MG/DL — SIGNIFICANT CHANGE UP (ref 0.2–1.2)
BUN SERPL-MCNC: 12 MG/DL — SIGNIFICANT CHANGE UP (ref 7–23)
CALCIUM SERPL-MCNC: 9.8 MG/DL — SIGNIFICANT CHANGE UP (ref 8.4–10.5)
CHLORIDE SERPL-SCNC: 105 MMOL/L — SIGNIFICANT CHANGE UP (ref 98–107)
CO2 SERPL-SCNC: 22 MMOL/L — SIGNIFICANT CHANGE UP (ref 22–31)
CREAT SERPL-MCNC: 0.53 MG/DL — SIGNIFICANT CHANGE UP (ref 0.5–1.3)
EGFR: 118 ML/MIN/1.73M2 — SIGNIFICANT CHANGE UP
EOSINOPHIL # BLD AUTO: 0.19 K/UL — SIGNIFICANT CHANGE UP (ref 0–0.5)
EOSINOPHIL NFR BLD AUTO: 5 % — SIGNIFICANT CHANGE UP (ref 0–6)
GLUCOSE SERPL-MCNC: 91 MG/DL — SIGNIFICANT CHANGE UP (ref 70–99)
HCT VFR BLD CALC: 38.8 % — SIGNIFICANT CHANGE UP (ref 34.5–45)
HGB BLD-MCNC: 12.6 G/DL — SIGNIFICANT CHANGE UP (ref 11.5–15.5)
IANC: 1.87 K/UL — SIGNIFICANT CHANGE UP (ref 1.8–7.4)
IMM GRANULOCYTES NFR BLD AUTO: 0.8 % — SIGNIFICANT CHANGE UP (ref 0–0.9)
KAPPA LC SER QL IFE: 2.78 MG/DL — HIGH (ref 0.33–1.94)
KAPPA/LAMBDA FREE LIGHT CHAIN RATIO, SERUM: 1.12 RATIO — SIGNIFICANT CHANGE UP (ref 0.26–1.65)
LAMBDA LC SER QL IFE: 2.48 MG/DL — SIGNIFICANT CHANGE UP (ref 0.57–2.63)
LYMPHOCYTES # BLD AUTO: 1.35 K/UL — SIGNIFICANT CHANGE UP (ref 1–3.3)
LYMPHOCYTES # BLD AUTO: 35.7 % — SIGNIFICANT CHANGE UP (ref 13–44)
MAGNESIUM SERPL-MCNC: 1.8 MG/DL — SIGNIFICANT CHANGE UP (ref 1.6–2.6)
MCHC RBC-ENTMCNC: 28.9 PG — SIGNIFICANT CHANGE UP (ref 27–34)
MCHC RBC-ENTMCNC: 32.5 GM/DL — SIGNIFICANT CHANGE UP (ref 32–36)
MCV RBC AUTO: 89 FL — SIGNIFICANT CHANGE UP (ref 80–100)
MONOCYTES # BLD AUTO: 0.31 K/UL — SIGNIFICANT CHANGE UP (ref 0–0.9)
MONOCYTES NFR BLD AUTO: 8.2 % — SIGNIFICANT CHANGE UP (ref 2–14)
NEUTROPHILS # BLD AUTO: 1.87 K/UL — SIGNIFICANT CHANGE UP (ref 1.8–7.4)
NEUTROPHILS NFR BLD AUTO: 49.5 % — SIGNIFICANT CHANGE UP (ref 43–77)
NRBC # BLD: 0 /100 WBCS — SIGNIFICANT CHANGE UP (ref 0–0)
NRBC # FLD: 0 K/UL — SIGNIFICANT CHANGE UP (ref 0–0)
PHOSPHATE SERPL-MCNC: 4 MG/DL — SIGNIFICANT CHANGE UP (ref 2.5–4.5)
PLATELET # BLD AUTO: 242 K/UL — SIGNIFICANT CHANGE UP (ref 150–400)
POTASSIUM SERPL-MCNC: 3.8 MMOL/L — SIGNIFICANT CHANGE UP (ref 3.5–5.3)
POTASSIUM SERPL-SCNC: 3.8 MMOL/L — SIGNIFICANT CHANGE UP (ref 3.5–5.3)
PROT SERPL-MCNC: 7.2 G/DL — SIGNIFICANT CHANGE UP (ref 6–8.3)
RBC # BLD: 4.36 M/UL — SIGNIFICANT CHANGE UP (ref 3.8–5.2)
RBC # FLD: 14.9 % — HIGH (ref 10.3–14.5)
SODIUM SERPL-SCNC: 138 MMOL/L — SIGNIFICANT CHANGE UP (ref 135–145)
WBC # BLD: 3.78 K/UL — LOW (ref 3.8–10.5)
WBC # FLD AUTO: 3.78 K/UL — LOW (ref 3.8–10.5)

## 2023-07-17 PROCEDURE — 99232 SBSQ HOSP IP/OBS MODERATE 35: CPT | Mod: GC

## 2023-07-17 RX ADMIN — ENOXAPARIN SODIUM 40 MILLIGRAM(S): 100 INJECTION SUBCUTANEOUS at 17:36

## 2023-07-17 RX ADMIN — Medication 100 MILLIGRAM(S): at 05:17

## 2023-07-17 RX ADMIN — Medication 0.5 MILLIGRAM(S): at 18:20

## 2023-07-17 RX ADMIN — Medication 100 MILLIGRAM(S): at 17:35

## 2023-07-17 RX ADMIN — AMLODIPINE BESYLATE 5 MILLIGRAM(S): 2.5 TABLET ORAL at 05:17

## 2023-07-17 RX ADMIN — ENOXAPARIN SODIUM 40 MILLIGRAM(S): 100 INJECTION SUBCUTANEOUS at 05:17

## 2023-07-17 NOTE — PROGRESS NOTE ADULT - PROBLEM SELECTOR PLAN 3
- pending RUQ US  - HIV and acute hep panel pending - Hypoechoic liver lesions and hepatic steatosis on RUQ US  - Hepatitis panel (-)  - HIV pending  - CT A/P w/ liver protocol pending

## 2023-07-17 NOTE — PROGRESS NOTE ADULT - ATTENDING COMMENTS
EMG performed over weekend not consistent with diagnosis of CIDP despite peripheral symptoms. Outpatient EMG may be able to distinguish a large fiber sensory polyneuropathy to account for full complement of symptoms. Will pursue MR of full neuraxis and f/u outstanding serologic workup. Suspect pt will need rehab as she is still unable to stand or walk without assistance.

## 2023-07-17 NOTE — PROGRESS NOTE ADULT - ASSESSMENT
Ms Perez is a 42yo woman with a hx of peripheral neuropathy, sickle cell trait, asthma, and anemia who was admitted after suffering a fall with inability to get up due to 4 months of ongoing bilateral lower and upper extremity weakness and paresthesias. Admitted for further workup. Neuro on board. No evidence of motor polyradiculoneuropathy on EMG. MRI head, C/T/L spine pending.

## 2023-07-17 NOTE — PROGRESS NOTE ADULT - SUBJECTIVE AND OBJECTIVE BOX
PROGRESS NOTE:   Authored by Julia Alexandra MD PGY-1  Internal Medicine      Patient is a 43y old  Female who presents with a chief complaint of Weakness in lower and upper extremities (16 Jul 2023 11:34)      SUBJECTIVE / OVERNIGHT EVENTS: ***, patient seen and examined at bedside    MEDICATIONS  (STANDING):  amLODIPine   Tablet 5 milliGRAM(s) Oral daily  enoxaparin Injectable 40 milliGRAM(s) SubCutaneous every 12 hours  pregabalin 100 milliGRAM(s) Oral two times a day    MEDICATIONS  (PRN):  diphenhydrAMINE Injectable 25 milliGRAM(s) IV Push every 6 hours PRN Rash and/or Itching  LORazepam     Tablet 0.5 milliGRAM(s) Oral once PRN Anxiety      CAPILLARY BLOOD GLUCOSE        I&O's Summary    16 Jul 2023 07:01  -  17 Jul 2023 07:00  --------------------------------------------------------  IN: 0 mL / OUT: 1000 mL / NET: -1000 mL        PHYSICAL EXAM:  Vital Signs Last 24 Hrs  T(C): 36.6 (17 Jul 2023 05:15), Max: 36.9 (16 Jul 2023 14:55)  T(F): 97.8 (17 Jul 2023 05:15), Max: 98.4 (16 Jul 2023 14:55)  HR: 96 (17 Jul 2023 05:15) (94 - 100)  BP: 137/106 (17 Jul 2023 05:15) (137/106 - 148/98)  BP(mean): --  RR: 16 (17 Jul 2023 05:15) (16 - 17)  SpO2: 98% (17 Jul 2023 05:15) (98% - 100%)    Parameters below as of 17 Jul 2023 05:15  Patient On (Oxygen Delivery Method): room air      CONSTITUTIONAL: Well-groomed, in no apparent distress  RESPIRATORY: Breathing comfortably; no dullness to percussion; lungs CTA without wheeze/rhonchi/rales  CARDIOVASCULAR: +S1S2, RRR, no M/G/R; pedal pulses full and symmetric; no lower extremity edema  GASTROINTESTINAL: No palpable masses or tenderness, +BS throughout, no rebound/guarding; no hepatosplenomegaly; no hernia palpated  SKIN: No rashes or ulcers noted  NEUROLOGIC: A+O x 3, CN II-XII intact; sensation intact in LEs b/l to light touch    LABS:                        11.8   3.70  )-----------( 214      ( 16 Jul 2023 06:34 )             36.9     07-16    142  |  101  |  x   ----------------------------<  x   4.0   |  23  |  x     Ca    9.4      16 Jul 2023 06:34  Phos  3.4     07-16  Mg     1.70     07-16    TPro  7.4  /  Alb  x   /  TBili  x   /  DBili  x   /  AST  x   /  ALT  x   /  AlkPhos  x   07-16       PROGRESS NOTE:   Authored by Julia Alexandra MD PGY-1  Internal Medicine      Patient is a 43y old  Female who presents with a chief complaint of Weakness in lower and upper extremities (16 Jul 2023 11:34)      SUBJECTIVE / OVERNIGHT EVENT: NAEO. Pt notes she's experiencing increased pain and tingling in her hands, L>R. Otherwise her lower extremity symptoms are stable. Pt was seen and examined at bedside    MEDICATIONS  (STANDING):  amLODIPine   Tablet 5 milliGRAM(s) Oral daily  enoxaparin Injectable 40 milliGRAM(s) SubCutaneous every 12 hours  pregabalin 100 milliGRAM(s) Oral two times a day    MEDICATIONS  (PRN):  diphenhydrAMINE Injectable 25 milliGRAM(s) IV Push every 6 hours PRN Rash and/or Itching  LORazepam     Tablet 0.5 milliGRAM(s) Oral once PRN Anxiety      CAPILLARY BLOOD GLUCOSE        I&O's Summary    16 Jul 2023 07:01  -  17 Jul 2023 07:00  --------------------------------------------------------  IN: 0 mL / OUT: 1000 mL / NET: -1000 mL        PHYSICAL EXAM:  Vital Signs Last 24 Hrs  T(C): 36.6 (17 Jul 2023 05:15), Max: 36.9 (16 Jul 2023 14:55)  T(F): 97.8 (17 Jul 2023 05:15), Max: 98.4 (16 Jul 2023 14:55)  HR: 96 (17 Jul 2023 05:15) (94 - 100)  BP: 137/106 (17 Jul 2023 05:15) (137/106 - 148/98)  RR: 16 (17 Jul 2023 05:15) (16 - 17)  SpO2: 98% (17 Jul 2023 05:15) (98% - 100%)    Parameters below as of 17 Jul 2023 05:15  Patient On (Oxygen Delivery Method): room air      CHEST/LUNG: Clear to auscultation bilaterally  HEART: Regular rate and rhythm; No murmurs, rubs, or gallops  ABDOMEN: Bowel sounds present; Soft, Nontender, Nondistended  NERVOUS SYSTEM:  Alert & Oriented X3, speech clear. CN II-XII intact. LLE sensation decrease, more than RLE. LUE sensation decreased, more than RUE.  MSK: LLE: 3/5 proximal, 4/5 distal     RLE: 4/5 proximal and distal    LUE: 4/5 proximal and distal   RUE 4/5 proximal and distal  SKIN: No rashes or lesions      LABS:                        11.8   3.70  )-----------( 214      ( 16 Jul 2023 06:34 )             36.9     07-16    142  |  101  |  x   ----------------------------<  x   4.0   |  23  |  x     Ca    9.4      16 Jul 2023 06:34  Phos  3.4     07-16  Mg     1.70     07-16    TPro  7.4  /  Alb  x   /  TBili  x   /  DBili  x   /  AST  x   /  ALT  x   /  AlkPhos  x   07-16

## 2023-07-17 NOTE — PROGRESS NOTE ADULT - PROBLEM SELECTOR PLAN 1
Ascending weakness and paresthesias, beginning in 3/2023  Possible CIDP vs Guillan [place] vs vitamin deficiency vs uncontrolled diabetes  EMG: No electrophysiologic evidence of a motor polyradiculoneuropathy - findings are not consistent with a diagnosis of CIDP    Plan:  -MR head, c/t/l spine  -Extensive labs ordered and pending  -Lyrica 20 ordered 7/15 Ascending weakness and paresthesias, beginning in 3/2023  Possible CIDP vs Guillan [place] vs vitamin deficiency vs uncontrolled diabetes  EMG: No electrophysiologic evidence of a motor polyradiculoneuropathy - findings are not consistent with a diagnosis of CIDP    Plan:  -MR head, c/t/l spine  -Extensive labs ordered and pending  -Lyrica 50 mg increased to 100mg BID

## 2023-07-18 LAB
ALBUMIN SERPL ELPH-MCNC: 3.7 G/DL — SIGNIFICANT CHANGE UP (ref 3.3–5)
ALP SERPL-CCNC: 64 U/L — SIGNIFICANT CHANGE UP (ref 40–120)
ALT FLD-CCNC: 56 U/L — HIGH (ref 4–33)
ANION GAP SERPL CALC-SCNC: 15 MMOL/L — HIGH (ref 7–14)
AST SERPL-CCNC: 105 U/L — HIGH (ref 4–32)
BASOPHILS # BLD AUTO: 0.03 K/UL — SIGNIFICANT CHANGE UP (ref 0–0.2)
BASOPHILS NFR BLD AUTO: 0.8 % — SIGNIFICANT CHANGE UP (ref 0–2)
BILIRUB SERPL-MCNC: 0.5 MG/DL — SIGNIFICANT CHANGE UP (ref 0.2–1.2)
BUN SERPL-MCNC: 11 MG/DL — SIGNIFICANT CHANGE UP (ref 7–23)
CALCIUM SERPL-MCNC: 9.8 MG/DL — SIGNIFICANT CHANGE UP (ref 8.4–10.5)
CHLORIDE SERPL-SCNC: 102 MMOL/L — SIGNIFICANT CHANGE UP (ref 98–107)
CK SERPL-CCNC: 78 U/L — SIGNIFICANT CHANGE UP (ref 25–170)
CO2 SERPL-SCNC: 21 MMOL/L — LOW (ref 22–31)
CREAT SERPL-MCNC: 0.59 MG/DL — SIGNIFICANT CHANGE UP (ref 0.5–1.3)
DSDNA AB SER-ACNC: <12 IU/ML — SIGNIFICANT CHANGE UP
EGFR: 115 ML/MIN/1.73M2 — SIGNIFICANT CHANGE UP
EOSINOPHIL # BLD AUTO: 0.16 K/UL — SIGNIFICANT CHANGE UP (ref 0–0.5)
EOSINOPHIL NFR BLD AUTO: 4.5 % — SIGNIFICANT CHANGE UP (ref 0–6)
GLIADIN PEPTIDE IGA SER-ACNC: 7.4 UNITS — SIGNIFICANT CHANGE UP
GLIADIN PEPTIDE IGA SER-ACNC: NEGATIVE — SIGNIFICANT CHANGE UP
GLIADIN PEPTIDE IGG SER-ACNC: <5 UNITS — SIGNIFICANT CHANGE UP
GLIADIN PEPTIDE IGG SER-ACNC: NEGATIVE — SIGNIFICANT CHANGE UP
GLUCOSE SERPL-MCNC: 99 MG/DL — SIGNIFICANT CHANGE UP (ref 70–99)
HCT VFR BLD CALC: 37.9 % — SIGNIFICANT CHANGE UP (ref 34.5–45)
HCYS SERPL-MCNC: 19.5 UMOL/L — HIGH
HGB BLD-MCNC: 12.3 G/DL — SIGNIFICANT CHANGE UP (ref 11.5–15.5)
HIV 1+2 AB+HIV1 P24 AG SERPL QL IA: SIGNIFICANT CHANGE UP
IANC: 1.67 K/UL — LOW (ref 1.8–7.4)
IF BLOCK AB SER-ACNC: 1.1 AU/ML — SIGNIFICANT CHANGE UP (ref 0–1.1)
IMM GRANULOCYTES NFR BLD AUTO: 0.6 % — SIGNIFICANT CHANGE UP (ref 0–0.9)
INTERPRETATION 24H UR IFE-IMP: SIGNIFICANT CHANGE UP
LYMPHOCYTES # BLD AUTO: 1.32 K/UL — SIGNIFICANT CHANGE UP (ref 1–3.3)
LYMPHOCYTES # BLD AUTO: 37.3 % — SIGNIFICANT CHANGE UP (ref 13–44)
MAGNESIUM SERPL-MCNC: 1.8 MG/DL — SIGNIFICANT CHANGE UP (ref 1.6–2.6)
MCHC RBC-ENTMCNC: 28.5 PG — SIGNIFICANT CHANGE UP (ref 27–34)
MCHC RBC-ENTMCNC: 32.5 GM/DL — SIGNIFICANT CHANGE UP (ref 32–36)
MCV RBC AUTO: 87.9 FL — SIGNIFICANT CHANGE UP (ref 80–100)
MONOCYTES # BLD AUTO: 0.34 K/UL — SIGNIFICANT CHANGE UP (ref 0–0.9)
MONOCYTES NFR BLD AUTO: 9.6 % — SIGNIFICANT CHANGE UP (ref 2–14)
NEUTROPHILS # BLD AUTO: 1.67 K/UL — LOW (ref 1.8–7.4)
NEUTROPHILS NFR BLD AUTO: 47.2 % — SIGNIFICANT CHANGE UP (ref 43–77)
NRBC # BLD: 0 /100 WBCS — SIGNIFICANT CHANGE UP (ref 0–0)
NRBC # FLD: 0 K/UL — SIGNIFICANT CHANGE UP (ref 0–0)
PHOSPHATE SERPL-MCNC: 5 MG/DL — HIGH (ref 2.5–4.5)
PLATELET # BLD AUTO: 234 K/UL — SIGNIFICANT CHANGE UP (ref 150–400)
POTASSIUM SERPL-MCNC: 4.1 MMOL/L — SIGNIFICANT CHANGE UP (ref 3.5–5.3)
POTASSIUM SERPL-SCNC: 4.1 MMOL/L — SIGNIFICANT CHANGE UP (ref 3.5–5.3)
PROT SERPL-MCNC: 6.9 G/DL — SIGNIFICANT CHANGE UP (ref 6–8.3)
RBC # BLD: 4.31 M/UL — SIGNIFICANT CHANGE UP (ref 3.8–5.2)
RBC # FLD: 14.9 % — HIGH (ref 10.3–14.5)
SODIUM SERPL-SCNC: 138 MMOL/L — SIGNIFICANT CHANGE UP (ref 135–145)
TTG IGA SER-ACNC: 3 U/ML — SIGNIFICANT CHANGE UP
TTG IGA SER-ACNC: NEGATIVE — SIGNIFICANT CHANGE UP
TTG IGG SER IA-ACNC: NEGATIVE — SIGNIFICANT CHANGE UP
TTG IGG SER-ACNC: 2.9 U/ML — SIGNIFICANT CHANGE UP
WBC # BLD: 3.54 K/UL — LOW (ref 3.8–10.5)
WBC # FLD AUTO: 3.54 K/UL — LOW (ref 3.8–10.5)

## 2023-07-18 PROCEDURE — 99232 SBSQ HOSP IP/OBS MODERATE 35: CPT | Mod: GC

## 2023-07-18 RX ORDER — IBUPROFEN 200 MG
400 TABLET ORAL ONCE
Refills: 0 | Status: COMPLETED | OUTPATIENT
Start: 2023-07-18 | End: 2023-07-18

## 2023-07-18 RX ORDER — POLYETHYLENE GLYCOL 3350 17 G/17G
17 POWDER, FOR SOLUTION ORAL DAILY
Refills: 0 | Status: DISCONTINUED | OUTPATIENT
Start: 2023-07-18 | End: 2023-08-25

## 2023-07-18 RX ORDER — KETOROLAC TROMETHAMINE 30 MG/ML
15 SYRINGE (ML) INJECTION ONCE
Refills: 0 | Status: DISCONTINUED | OUTPATIENT
Start: 2023-07-18 | End: 2023-07-18

## 2023-07-18 RX ADMIN — AMLODIPINE BESYLATE 5 MILLIGRAM(S): 2.5 TABLET ORAL at 05:47

## 2023-07-18 RX ADMIN — POLYETHYLENE GLYCOL 3350 17 GRAM(S): 17 POWDER, FOR SOLUTION ORAL at 17:20

## 2023-07-18 RX ADMIN — ENOXAPARIN SODIUM 40 MILLIGRAM(S): 100 INJECTION SUBCUTANEOUS at 05:47

## 2023-07-18 RX ADMIN — Medication 15 MILLIGRAM(S): at 23:25

## 2023-07-18 RX ADMIN — Medication 400 MILLIGRAM(S): at 17:43

## 2023-07-18 RX ADMIN — Medication 400 MILLIGRAM(S): at 18:43

## 2023-07-18 RX ADMIN — Medication 100 MILLIGRAM(S): at 17:20

## 2023-07-18 RX ADMIN — Medication 15 MILLIGRAM(S): at 23:16

## 2023-07-18 RX ADMIN — Medication 100 MILLIGRAM(S): at 05:47

## 2023-07-18 RX ADMIN — ENOXAPARIN SODIUM 40 MILLIGRAM(S): 100 INJECTION SUBCUTANEOUS at 17:20

## 2023-07-18 NOTE — PROGRESS NOTE ADULT - PROBLEM SELECTOR PLAN 2
- Hypoechoic liver lesions and hepatic steatosis on RUQ US  - Hepatitis panel (-)  - HIV pending  - CT A/P w/ liver protocol pending

## 2023-07-18 NOTE — PROGRESS NOTE ADULT - ATTENDING COMMENTS
Pt with persistent LE weakness, LE paresthesias, and hand paresthesias. Infectious/toxic/metabolic/immunologic workup has been unremarkable; some tests are still pending, though these are unlikely to explain the pt's presentation. Will discuss the urgency of neuraxial imaging with Neurology and coordinate MR in the safest manner; would like to avoid anesthesia, and this might mean the pt needs outpatient open MRI, which she is agreeable to. Will continue to work with SW to coordinate BOOM, as the pt's condition makes it unsafe to discharge home at this time.

## 2023-07-18 NOTE — PROGRESS NOTE ADULT - SUBJECTIVE AND OBJECTIVE BOX
PROGRESS NOTE:   Authored by Julia Alexandra MD PGY-1  Internal Medicine      Patient is a 43y old  Female who presents with a chief complaint of Weakness in lower and upper extremities (17 Jul 2023 07:56)      SUBJECTIVE / OVERNIGHT EVENTS: Pt notes she experienced a 1 minute episode of her right lower extremity moving uncontrollably yesterday. She states she did not know her right LE was moving without her uncontrol until she was told by the nurse. Otherwise, her hand pain has decreased since yesterday. Leg pain and weakness is stable. She was taken for MRI imaging yesterday with sedation however pt did not tolerate it. Was informed she'd need MRI with anesthesia.      MEDICATIONS  (STANDING):  amLODIPine   Tablet 5 milliGRAM(s) Oral daily  enoxaparin Injectable 40 milliGRAM(s) SubCutaneous every 12 hours  pregabalin 100 milliGRAM(s) Oral two times a day    MEDICATIONS  (PRN):  diphenhydrAMINE Injectable 25 milliGRAM(s) IV Push every 6 hours PRN Rash and/or Itching        PHYSICAL EXAM:  Vital Signs Last 24 Hrs  T(C): 36.8 (18 Jul 2023 12:08), Max: 36.8 (18 Jul 2023 12:08)  T(F): 98.2 (18 Jul 2023 12:08), Max: 98.2 (18 Jul 2023 12:08)  HR: 97 (18 Jul 2023 12:08) (92 - 97)  BP: 133/91 (18 Jul 2023 12:08) (123/84 - 144/92)  RR: 16 (18 Jul 2023 12:08) (16 - 17)  SpO2: 100% (18 Jul 2023 12:08) (95% - 100%)    Parameters below as of 18 Jul 2023 12:08  Patient On (Oxygen Delivery Method): room air      CHEST/LUNG: Clear to auscultation bilaterally  HEART: Regular rate and rhythm; No murmurs, rubs, or gallops  ABDOMEN: Bowel sounds present; Soft, Nontender, Nondistended  NERVOUS SYSTEM:  Alert & Oriented X3, speech clear. CN II-XII intact. LLE sensation decrease, more than RLE. LUE sensation decreased, more than RUE.  MSK: LLE: 3/5 proximal, 4/5 distal     RLE: 4/5 proximal and distal    LUE: 4/5 proximal and distal   RUE 4/5 proximal and distal  SKIN: No rashes or lesions      LABS:                        12.3   3.54  )-----------( 234      ( 18 Jul 2023 06:30 )             37.9     07-18    138  |  102  |  11  ----------------------------<  99  4.1   |  21<L>  |  0.59    Ca    9.8      18 Jul 2023 06:30  Phos  5.0     07-18  Mg     1.80     07-18    TPro  6.9  /  Alb  3.7  /  TBili  0.5  /  DBili  x   /  AST  105<H>  /  ALT  56<H>  /  AlkPhos  64  07-18

## 2023-07-18 NOTE — PROGRESS NOTE ADULT - ASSESSMENT
Ms Perez is a 44yo woman with a hx of peripheral neuropathy, sickle cell trait, asthma, and anemia who was admitted after suffering a fall with inability to get up due to 4 months of ongoing bilateral lower and upper extremity weakness and paresthesias. Neuro on board. EMG unremarkable. MRI head, C/T/L spine with sedation unsuccessful as pt could not tolerate. Homocysteine slightly elevated however rest of lab workup unremarkable so far. Pt needs rehab on discharge as she is unable to walk.

## 2023-07-18 NOTE — PROGRESS NOTE ADULT - PROBLEM SELECTOR PLAN 1
Ascending weakness and paresthesias, beginning in 3/2023  Possible CIDP vs Guillan [place] vs transmyelitis   EMG: No electrophysiologic evidence of a motor polyradiculoneuropathy - findings are not consistent with a diagnosis of CIDP    Plan:  -MR head, c/t/l spine with sedation unsuccessful  -MR under anesthesia is an unnecessary risk for her at this time  -Labs largely unremarkable although further labs pending  -Lyrica 100mg BID  -Discussed with neuro. To follow up outpatient for further NCS and possible open MRI   -Will need to be discharge to [facility] as pt's ability to walk has been compromised Ascending weakness and paresthesias, beginning in 3/2023  Possible CIDP vs Guillan [place] vs transmyelitis   EMG: No electrophysiologic evidence of a motor polyradiculoneuropathy - findings are not consistent with a diagnosis of CIDP    Plan:  -MR head, c/t/l spine with sedation unsuccessful  -MR under anesthesia may be an unnecessary risk for her at this time; will discuss with Neurology  -Labs largely unremarkable although further labs pending  -Lyrica 100mg BID  -Discussed with neuro. To follow up outpatient for further NCS and possible open MRI   -Will need to be discharge to [facility] as pt's ability to walk has been compromised

## 2023-07-18 NOTE — PROGRESS NOTE ADULT - PROBLEM SELECTOR PLAN 3
Unlikely ACS, EKG NSR, trop (-)  Endorses mid epigastric burning pain  Also MSK in nature as pt has been using her upper body to compensate for LE weakness  Improved with rest since admission

## 2023-07-19 LAB
ALBUMIN SERPL ELPH-MCNC: 3.3 G/DL — SIGNIFICANT CHANGE UP (ref 3.3–5)
ALP SERPL-CCNC: 52 U/L — SIGNIFICANT CHANGE UP (ref 40–120)
ALT FLD-CCNC: 52 U/L — HIGH (ref 4–33)
ANION GAP SERPL CALC-SCNC: 11 MMOL/L — SIGNIFICANT CHANGE UP (ref 7–14)
APPEARANCE UR: CLEAR — SIGNIFICANT CHANGE UP
ARSENIC SERPL-MCNC: 2 UG/L — SIGNIFICANT CHANGE UP (ref 0–9)
AST SERPL-CCNC: 94 U/L — HIGH (ref 4–32)
BASOPHILS # BLD AUTO: 0.03 K/UL — SIGNIFICANT CHANGE UP (ref 0–0.2)
BASOPHILS NFR BLD AUTO: 0.8 % — SIGNIFICANT CHANGE UP (ref 0–2)
BILIRUB SERPL-MCNC: 0.5 MG/DL — SIGNIFICANT CHANGE UP (ref 0.2–1.2)
BILIRUB UR-MCNC: NEGATIVE — SIGNIFICANT CHANGE UP
BUN SERPL-MCNC: 12 MG/DL — SIGNIFICANT CHANGE UP (ref 7–23)
CADMIUM SERPL-MCNC: 0.9 UG/L — SIGNIFICANT CHANGE UP (ref 0–1.2)
CALCIUM SERPL-MCNC: 9.5 MG/DL — SIGNIFICANT CHANGE UP (ref 8.4–10.5)
CHLORIDE SERPL-SCNC: 103 MMOL/L — SIGNIFICANT CHANGE UP (ref 98–107)
CO2 SERPL-SCNC: 23 MMOL/L — SIGNIFICANT CHANGE UP (ref 22–31)
COLOR SPEC: YELLOW — SIGNIFICANT CHANGE UP
CREAT SERPL-MCNC: 0.52 MG/DL — SIGNIFICANT CHANGE UP (ref 0.5–1.3)
DIFF PNL FLD: NEGATIVE — SIGNIFICANT CHANGE UP
EGFR: 118 ML/MIN/1.73M2 — SIGNIFICANT CHANGE UP
EOSINOPHIL # BLD AUTO: 0.18 K/UL — SIGNIFICANT CHANGE UP (ref 0–0.5)
EOSINOPHIL NFR BLD AUTO: 5.1 % — SIGNIFICANT CHANGE UP (ref 0–6)
GLUCOSE SERPL-MCNC: 80 MG/DL — SIGNIFICANT CHANGE UP (ref 70–99)
GLUCOSE UR QL: NEGATIVE MG/DL — SIGNIFICANT CHANGE UP
HCT VFR BLD CALC: 36 % — SIGNIFICANT CHANGE UP (ref 34.5–45)
HGB BLD-MCNC: 11.4 G/DL — LOW (ref 11.5–15.5)
IANC: 1.72 K/UL — LOW (ref 1.8–7.4)
IMM GRANULOCYTES NFR BLD AUTO: 0.3 % — SIGNIFICANT CHANGE UP (ref 0–0.9)
INTERPRETATION 24H UR IFE-IMP: SIGNIFICANT CHANGE UP
KETONES UR-MCNC: NEGATIVE MG/DL — SIGNIFICANT CHANGE UP
LEAD BLD-MCNC: 1.6 UG/DL — SIGNIFICANT CHANGE UP (ref 0–3.4)
LEUKOCYTE ESTERASE UR-ACNC: NEGATIVE — SIGNIFICANT CHANGE UP
LYMPHOCYTES # BLD AUTO: 1.28 K/UL — SIGNIFICANT CHANGE UP (ref 1–3.3)
LYMPHOCYTES # BLD AUTO: 36.1 % — SIGNIFICANT CHANGE UP (ref 13–44)
MAGNESIUM SERPL-MCNC: 2 MG/DL — SIGNIFICANT CHANGE UP (ref 1.6–2.6)
MCHC RBC-ENTMCNC: 28.6 PG — SIGNIFICANT CHANGE UP (ref 27–34)
MCHC RBC-ENTMCNC: 31.7 GM/DL — LOW (ref 32–36)
MCV RBC AUTO: 90.5 FL — SIGNIFICANT CHANGE UP (ref 80–100)
MERCURY SERPL-MCNC: 2.2 UG/L — SIGNIFICANT CHANGE UP (ref 0–14.9)
MONOCYTES # BLD AUTO: 0.33 K/UL — SIGNIFICANT CHANGE UP (ref 0–0.9)
MONOCYTES NFR BLD AUTO: 9.3 % — SIGNIFICANT CHANGE UP (ref 2–14)
NEUTROPHILS # BLD AUTO: 1.72 K/UL — LOW (ref 1.8–7.4)
NEUTROPHILS NFR BLD AUTO: 48.4 % — SIGNIFICANT CHANGE UP (ref 43–77)
NITRITE UR-MCNC: NEGATIVE — SIGNIFICANT CHANGE UP
NRBC # BLD: 0 /100 WBCS — SIGNIFICANT CHANGE UP (ref 0–0)
NRBC # FLD: 0 K/UL — SIGNIFICANT CHANGE UP (ref 0–0)
PH UR: 7.5 — SIGNIFICANT CHANGE UP (ref 5–8)
PHOSPHATE SERPL-MCNC: 4.7 MG/DL — HIGH (ref 2.5–4.5)
PLATELET # BLD AUTO: 208 K/UL — SIGNIFICANT CHANGE UP (ref 150–400)
POTASSIUM SERPL-MCNC: 3.9 MMOL/L — SIGNIFICANT CHANGE UP (ref 3.5–5.3)
POTASSIUM SERPL-SCNC: 3.9 MMOL/L — SIGNIFICANT CHANGE UP (ref 3.5–5.3)
PROT SERPL-MCNC: 6.7 G/DL — SIGNIFICANT CHANGE UP (ref 6–8.3)
PROT UR-MCNC: NEGATIVE MG/DL — SIGNIFICANT CHANGE UP
RBC # BLD: 3.98 M/UL — SIGNIFICANT CHANGE UP (ref 3.8–5.2)
RBC # FLD: 14.6 % — HIGH (ref 10.3–14.5)
SODIUM SERPL-SCNC: 137 MMOL/L — SIGNIFICANT CHANGE UP (ref 135–145)
SP GR SPEC: 1.04 — HIGH (ref 1–1.03)
UROBILINOGEN FLD QL: 1 MG/DL — SIGNIFICANT CHANGE UP (ref 0.2–1)
WBC # BLD: 3.55 K/UL — LOW (ref 3.8–10.5)
WBC # FLD AUTO: 3.55 K/UL — LOW (ref 3.8–10.5)

## 2023-07-19 PROCEDURE — 99233 SBSQ HOSP IP/OBS HIGH 50: CPT | Mod: GC

## 2023-07-19 PROCEDURE — 99221 1ST HOSP IP/OBS SF/LOW 40: CPT

## 2023-07-19 PROCEDURE — 74177 CT ABD & PELVIS W/CONTRAST: CPT | Mod: 26

## 2023-07-19 RX ORDER — MORPHINE SULFATE 50 MG/1
4 CAPSULE, EXTENDED RELEASE ORAL ONCE
Refills: 0 | Status: DISCONTINUED | OUTPATIENT
Start: 2023-07-19 | End: 2023-07-20

## 2023-07-19 RX ADMIN — ENOXAPARIN SODIUM 40 MILLIGRAM(S): 100 INJECTION SUBCUTANEOUS at 17:20

## 2023-07-19 RX ADMIN — Medication 100 MILLIGRAM(S): at 05:55

## 2023-07-19 RX ADMIN — POLYETHYLENE GLYCOL 3350 17 GRAM(S): 17 POWDER, FOR SOLUTION ORAL at 12:50

## 2023-07-19 RX ADMIN — ENOXAPARIN SODIUM 40 MILLIGRAM(S): 100 INJECTION SUBCUTANEOUS at 05:56

## 2023-07-19 RX ADMIN — Medication 100 MILLIGRAM(S): at 17:20

## 2023-07-19 RX ADMIN — AMLODIPINE BESYLATE 5 MILLIGRAM(S): 2.5 TABLET ORAL at 05:56

## 2023-07-19 NOTE — PROGRESS NOTE ADULT - PROBLEM SELECTOR PLAN 1
Ascending weakness and paresthesias, beginning in 3/2023  Possible CIDP vs Guillan [place] vs transmyelitis   EMG: No electrophysiologic evidence of a motor polyradiculoneuropathy - findings are not consistent with a diagnosis of CIDP    Plan:  -MR head, c/t/l spine with sedation unsuccessful  -MR under anesthesia may be an unnecessary risk for her at this time; will discuss with Neurology  -Labs largely unremarkable although further labs pending  -Lyrica 100mg BID  -Discussed with neuro. To follow up outpatient for further NCS and possible open MRI   -Will need to be discharge to [facility] as pt's ability to walk has been compromised

## 2023-07-19 NOTE — PROGRESS NOTE ADULT - ATTENDING COMMENTS
Pt has continued to have waxing/waning paresthesias and weakness in LE and hands b/l. Now also with left eye blurriness. Differential remains broad including demyelinating disease such as MS, autoimmune, myelitis, or polyneuropathy. Will request LP from procedure team. Will reattempt MRI of head and spine with pre-test anxiolytic. If pt is unable to complete MRI again, will consider exam under anesthesia.

## 2023-07-19 NOTE — DIETITIAN INITIAL EVALUATION ADULT - PERTINENT LABORATORY DATA
07-19    137  |  103  |  12  ----------------------------<  80  3.9   |  23  |  0.52    Ca    9.5      19 Jul 2023 05:24  Phos  4.7     07-19  Mg     2.00     07-19    TPro  6.7  /  Alb  3.3  /  TBili  0.5  /  DBili  x   /  AST  94<H>  /  ALT  52<H>  /  AlkPhos  52  07-19  A1C with Estimated Average Glucose Result: 5.0 % (07-16-23 @ 09:27)

## 2023-07-19 NOTE — DIETITIAN INITIAL EVALUATION ADULT - ORAL INTAKE PTA/DIET HISTORY
Patient seen for assessment. Reports decreased appetite for awhile PTA r/t weakness. Doesn't follow a diet at home.

## 2023-07-19 NOTE — PROGRESS NOTE ADULT - SUBJECTIVE AND OBJECTIVE BOX
PROGRESS NOTE:   Authored by Julia Alexandra MD PGY-1  Internal Medicine      Patient is a 43y old  Female who presents with a chief complaint of Weakness in lower and upper extremities (19 Jul 2023 09:20)      SUBJECTIVE / OVERNIGHT EVENTS: Pt experienced LLE shooting pain extending up to lower back. She received toradol and was able to sleep after. Currently experiencing a mild left sided headache and slightly blurry vision in left eye. She was seen and examined at bedside.    MEDICATIONS  (STANDING):  amLODIPine   Tablet 5 milliGRAM(s) Oral daily  enoxaparin Injectable 40 milliGRAM(s) SubCutaneous every 12 hours  polyethylene glycol 3350 17 Gram(s) Oral daily  pregabalin 100 milliGRAM(s) Oral two times a day    MEDICATIONS  (PRN):  diphenhydrAMINE Injectable 25 milliGRAM(s) IV Push every 6 hours PRN Rash and/or Itching          PHYSICAL EXAM:  Vital Signs Last 24 Hrs  T(C): 36.4 (19 Jul 2023 05:47), Max: 36.8 (18 Jul 2023 12:08)  T(F): 97.6 (19 Jul 2023 05:47), Max: 98.2 (18 Jul 2023 12:08)  HR: 78 (19 Jul 2023 05:47) (78 - 97)  BP: 154/89 (19 Jul 2023 05:47) (133/91 - 154/89)  RR: 16 (19 Jul 2023 05:47) (16 - 17)  SpO2: 100% (19 Jul 2023 05:47) (97% - 100%)    Parameters below as of 19 Jul 2023 05:47  Patient On (Oxygen Delivery Method): room air      CHEST/LUNG: Clear to auscultation bilaterally  HEART: Regular rate and rhythm; No murmurs, rubs, or gallops  ABDOMEN: Bowel sounds present; Soft, Nontender, Nondistended  NERVOUS SYSTEM:  Alert & Oriented X3, speech clear. CN II-XII intact. LLE sensation decrease, more than RLE. LUE sensation decreased, more than RUE.  MSK: LLE: 4/5 proximal, 4/5 distal     RLE: 4/5 proximal and distal    LUE: 4/5 proximal and distal   RUE 4/5 proximal and distal  SKIN: No rashes or lesions    LABS:                        11.4   3.55  )-----------( 208      ( 19 Jul 2023 05:24 )             36.0     07-19    137  |  103  |  12  ----------------------------<  80  3.9   |  23  |  0.52    Ca    9.5      19 Jul 2023 05:24  Phos  4.7     07-19  Mg     2.00     07-19    TPro  6.7  /  Alb  3.3  /  TBili  0.5  /  DBili  x   /  AST  94<H>  /  ALT  52<H>  /  AlkPhos  52  07-19

## 2023-07-19 NOTE — CHART NOTE - NSCHARTNOTEFT_GEN_A_CORE
IPT Consulted for Diagnostic Lumbar Puncture.    Indications for Lumbar Puncture Include:  URGENT  - Concern for CNS Infection  - Suspected SAH  NONURGENT  - Suspect Idiopathic Intracranial Hypertension (IIH)  - Suspect Carcinomatosis Meningitis  - Concern NPH  - Concern for CNS Lymphoma  - Concern for Neurosyphilis  - Concern for Autoimmune Encephalitis  SUPPLEMENTING OTHER DIAGNOSTICS  - Multiple Sclerosis  - GBS  - CNS Vasculitis  - Paraneoplastic Syndromes  - Neurosarcoid    Contraindications to Lumbar Puncture include:  - Unstable Airway or other hemodynamics  - Increased ICP with concern for possible cerebral herniation, obstructive hydrocephalus, cerebral edema, or space-occupying lesion ( must be cleared by Neurosurgery first )  - Intrathecal pump (must be done with fluoro)  - Severe Thrombocytopenia (Platelets < 50k)  - Known Spinal Epidural Abscess, Cellulitis at injection site  - Anticoagulant Therapy:  ----- Anti-Platelets: Prasugrel, Clopidogrel, ticagrelor, etc. to be held 5-7 days prior to procedure  ----- DOACs: Apixaban, Rivaroxaban, Dabigatran to be held 24 hours prior to procedure, resumed 24 hours post procedure  ----- Therapeutic LVX: Held 24 hours prior to procedure, resumed 24 hours post-procedure  ----- Therapeutic Heparin: Held 4 hours prior to procedure, resumed 1 hour post-procedure  ----- Prophylactic LVX: Held 12 hours prior to procedure, resumed 4 hours post-procedure  ----- Prophylactic Heparin: Held 4 hours prior to procedure, resumed 1 hour post-procedure  ----- Warfarin: Held until INR < 1.5      ASSESSMENT/RECOMMENDATIONS  This is a 43F with h/o peripheral neuropathy, sickle cell trait, asthma who presents c/o progressive b/l UE & LE weakness. Evaluation thus far notable only for elevated homocysteine, which may indicate hypercoagulable state or nutritional deficit not yet elucidated. It seems the differential is broad. Unfortunately, pt with hypoenhancing liver lesions and neuro deficits should have intracranial imaging to assess for mass lesion. Understanding pt unable to tolerate MRI, would at least obtain CT imaging to rule out CNS lesion as etiology for pt's symptoms.  - Please obtain CT Head to r/o intracranial lesion or evidence of increased ICP  - Recommend Ophthalmology c/s as pt describing blurry vision, consider Dilated Fundus Exam to assess for evidence of increased ICP (e.g. IIH/papilledema)  - Clarify with Neurology if LP would clarify diagnostics at this point.    Once these above are completed, recommend:  - Please obtain CBC/PT/PTT/INR on AM of procedure (will notify of planned date when workup completed)  - Please hold therapeutic/prophylactic anticoagulation based on recommendations above  - Please place orders for any requested diagnostic studies on day of procedure. These are used to calculate the fluid volume necessary.   - Primary team must physically deliver collected samples to lab. (Cannot be sent through pneumatic tube)  - If Prion disease suspected, samples MUST be collected with IR in OR Suite (Policy #: [id number])  - Pt does NOT need to be NPO  - If pt is NOT fully alert & oriented, please identify pt's surrogate decision maker and notify procedure team.    Procedure team to follow but pt not current specifically planned for procedure as yet.    Mitchell Verdin MD PGY-3  Case D/W Dr. Jensen

## 2023-07-19 NOTE — DIETITIAN INITIAL EVALUATION ADULT - PERTINENT MEDS FT
MEDICATIONS  (STANDING):  amLODIPine   Tablet 5 milliGRAM(s) Oral daily  enoxaparin Injectable 40 milliGRAM(s) SubCutaneous every 12 hours  polyethylene glycol 3350 17 Gram(s) Oral daily  pregabalin 100 milliGRAM(s) Oral two times a day    MEDICATIONS  (PRN):  diphenhydrAMINE Injectable 25 milliGRAM(s) IV Push every 6 hours PRN Rash and/or Itching

## 2023-07-19 NOTE — DIETITIAN INITIAL EVALUATION ADULT - OTHER INFO
43 year old female with a PMH of peripheral neuropathy, sickle cell trait and anemia who was admitted after suffering a fall with inability to get up due to 4 months of ongoing bilateral lower and upper extremity weakness and paresthesias per chart.    Patient reporting good appetite now. Consuming % of meals per RN flow sheet. No GI distress or chewing/swallowing difficulties. On bowel regimen. Has no food allergies. UBW is 250 lbs. and feels she's gained weight due to limited mobility from weakness. ABW is 266.7 lbs. (7/19) and 244 lbs. (7/12) per chart, ?accuracy of weights as it may be r/t scale error, will monitor. No edema or pressure injuries noted per RN flow sheet.    Patient declined needing nutrition education at this time.
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
## 2023-07-19 NOTE — PROGRESS NOTE ADULT - ASSESSMENT
Ms Perez is a 44yo woman with a hx of peripheral neuropathy, sickle cell trait, asthma, and anemia who was admitted after suffering a fall with inability to get up due to 4 months of ongoing bilateral lower and upper extremity weakness and paresthesias. Neuro on board. EMG unremarkable. MRI head, C/T/L spine with minimal sedation unsuccessful as pt could not tolerate being still for prolonged time. Homocysteine slightly elevated however rest of lab workup unremarkable so far. Pt needs rehab on discharge as she is unable to walk.

## 2023-07-19 NOTE — DIETITIAN INITIAL EVALUATION ADULT - PROBLEM SELECTOR PLAN 1
Ascending weakness and paresthesias, beginning in 3/2023  Possible [place] vs CIDP vs vitamin deficiency vs uncontrolled diabetes    Plan:  -Neuro consulted  -Vitamin B1, B6, B9, B12 levels pending  -HIV, syphillis, [place], heavy metal screen pending  -TSH, ESR, CRP pending  -A1c pending  -MRI head and C spine ordered

## 2023-07-20 DIAGNOSIS — M50.20 OTHER CERVICAL DISC DISPLACEMENT, UNSPECIFIED CERVICAL REGION: ICD-10-CM

## 2023-07-20 LAB
% ALBUMIN: 42.2 % — SIGNIFICANT CHANGE UP
% ALPHA 1: 3.9 % — SIGNIFICANT CHANGE UP
% ALPHA 2: 13.4 % — SIGNIFICANT CHANGE UP
% BETA: 16.1 % — SIGNIFICANT CHANGE UP
% GAMMA, URINE: SIGNIFICANT CHANGE UP
% GAMMA: 24.3 % — SIGNIFICANT CHANGE UP
ALBUMIN 24H MFR UR ELPH: PRESENT
ALBUMIN SERPL ELPH-MCNC: 3.12 G/DL — LOW (ref 3.3–4.4)
ALBUMIN SERPL ELPH-MCNC: 3.5 G/DL — SIGNIFICANT CHANGE UP (ref 3.3–5)
ALBUMIN/GLOB SERPL ELPH: 0.7 RATIO — SIGNIFICANT CHANGE UP
ALP SERPL-CCNC: 59 U/L — SIGNIFICANT CHANGE UP (ref 40–120)
ALPHA1 GLOB 24H MFR UR ELPH: SIGNIFICANT CHANGE UP
ALPHA1 GLOB SERPL ELPH-MCNC: 0.29 G/DL — SIGNIFICANT CHANGE UP (ref 0.1–0.3)
ALPHA2 GLOB 24H MFR UR ELPH: SIGNIFICANT CHANGE UP
ALPHA2 GLOB SERPL ELPH-MCNC: 1 G/DL — SIGNIFICANT CHANGE UP (ref 0.6–1)
ALT FLD-CCNC: 52 U/L — HIGH (ref 4–33)
ANION GAP SERPL CALC-SCNC: 10 MMOL/L — SIGNIFICANT CHANGE UP (ref 7–14)
AST SERPL-CCNC: 84 U/L — HIGH (ref 4–32)
B-GLOBULIN 24H MFR UR ELPH: SIGNIFICANT CHANGE UP
B-GLOBULIN SERPL ELPH-MCNC: 1.19 G/DL — HIGH (ref 0.6–1.1)
BASOPHILS # BLD AUTO: 0.04 K/UL — SIGNIFICANT CHANGE UP (ref 0–0.2)
BASOPHILS NFR BLD AUTO: 1.2 % — SIGNIFICANT CHANGE UP (ref 0–2)
BILIRUB SERPL-MCNC: 0.5 MG/DL — SIGNIFICANT CHANGE UP (ref 0.2–1.2)
BUN SERPL-MCNC: 9 MG/DL — SIGNIFICANT CHANGE UP (ref 7–23)
CALCIUM SERPL-MCNC: 9.5 MG/DL — SIGNIFICANT CHANGE UP (ref 8.4–10.5)
CHLORIDE SERPL-SCNC: 103 MMOL/L — SIGNIFICANT CHANGE UP (ref 98–107)
CO2 SERPL-SCNC: 26 MMOL/L — SIGNIFICANT CHANGE UP (ref 22–31)
CREAT SERPL-MCNC: 0.59 MG/DL — SIGNIFICANT CHANGE UP (ref 0.5–1.3)
CRYOGLOB SERPL-MCNC: NEGATIVE — SIGNIFICANT CHANGE UP
EGFR: 115 ML/MIN/1.73M2 — SIGNIFICANT CHANGE UP
EOSINOPHIL # BLD AUTO: 0.15 K/UL — SIGNIFICANT CHANGE UP (ref 0–0.5)
EOSINOPHIL NFR BLD AUTO: 4.5 % — SIGNIFICANT CHANGE UP (ref 0–6)
GAMMA GLOBULIN: 1.8 G/DL — HIGH (ref 0.7–1.7)
GLUCOSE SERPL-MCNC: 94 MG/DL — SIGNIFICANT CHANGE UP (ref 70–99)
HCT VFR BLD CALC: 34.2 % — LOW (ref 34.5–45)
HGB BLD-MCNC: 11.3 G/DL — LOW (ref 11.5–15.5)
IANC: 1.66 K/UL — LOW (ref 1.8–7.4)
IMM GRANULOCYTES NFR BLD AUTO: 0.9 % — SIGNIFICANT CHANGE UP (ref 0–0.9)
LYMPHOCYTES # BLD AUTO: 1.1 K/UL — SIGNIFICANT CHANGE UP (ref 1–3.3)
LYMPHOCYTES # BLD AUTO: 32.9 % — SIGNIFICANT CHANGE UP (ref 13–44)
M PROTEIN 24H UR ELPH-MRATE: SIGNIFICANT CHANGE UP
MAGNESIUM SERPL-MCNC: 1.9 MG/DL — SIGNIFICANT CHANGE UP (ref 1.6–2.6)
MCHC RBC-ENTMCNC: 29.4 PG — SIGNIFICANT CHANGE UP (ref 27–34)
MCHC RBC-ENTMCNC: 33 GM/DL — SIGNIFICANT CHANGE UP (ref 32–36)
MCV RBC AUTO: 89.1 FL — SIGNIFICANT CHANGE UP (ref 80–100)
MONOCYTES # BLD AUTO: 0.36 K/UL — SIGNIFICANT CHANGE UP (ref 0–0.9)
MONOCYTES NFR BLD AUTO: 10.8 % — SIGNIFICANT CHANGE UP (ref 2–14)
NEUTROPHILS # BLD AUTO: 1.66 K/UL — LOW (ref 1.8–7.4)
NEUTROPHILS NFR BLD AUTO: 49.7 % — SIGNIFICANT CHANGE UP (ref 43–77)
NRBC # BLD: 0 /100 WBCS — SIGNIFICANT CHANGE UP (ref 0–0)
NRBC # FLD: 0 K/UL — SIGNIFICANT CHANGE UP (ref 0–0)
PHOSPHATE SERPL-MCNC: 4.7 MG/DL — HIGH (ref 2.5–4.5)
PLATELET # BLD AUTO: 219 K/UL — SIGNIFICANT CHANGE UP (ref 150–400)
POTASSIUM SERPL-MCNC: 4.2 MMOL/L — SIGNIFICANT CHANGE UP (ref 3.5–5.3)
POTASSIUM SERPL-SCNC: 4.2 MMOL/L — SIGNIFICANT CHANGE UP (ref 3.5–5.3)
PROT ?TM UR-MCNC: 15 MG/DL — SIGNIFICANT CHANGE UP
PROT PATTERN 24H UR ELPH-IMP: SIGNIFICANT CHANGE UP
PROT PATTERN SERPL ELPH-IMP: SIGNIFICANT CHANGE UP
PROT SERPL-MCNC: 6.7 G/DL — SIGNIFICANT CHANGE UP (ref 6–8.3)
PROT SERPL-MCNC: 7.4 G/DL — SIGNIFICANT CHANGE UP
RBC # BLD: 3.84 M/UL — SIGNIFICANT CHANGE UP (ref 3.8–5.2)
RBC # FLD: 14.4 % — SIGNIFICANT CHANGE UP (ref 10.3–14.5)
SODIUM SERPL-SCNC: 139 MMOL/L — SIGNIFICANT CHANGE UP (ref 135–145)
WBC # BLD: 3.34 K/UL — LOW (ref 3.8–10.5)
WBC # FLD AUTO: 3.34 K/UL — LOW (ref 3.8–10.5)

## 2023-07-20 PROCEDURE — 72141 MRI NECK SPINE W/O DYE: CPT | Mod: 26

## 2023-07-20 PROCEDURE — 70551 MRI BRAIN STEM W/O DYE: CPT | Mod: 26

## 2023-07-20 PROCEDURE — 72146 MRI CHEST SPINE W/O DYE: CPT | Mod: 26

## 2023-07-20 PROCEDURE — 99232 SBSQ HOSP IP/OBS MODERATE 35: CPT

## 2023-07-20 PROCEDURE — 99233 SBSQ HOSP IP/OBS HIGH 50: CPT | Mod: GC

## 2023-07-20 RX ORDER — MORPHINE SULFATE 50 MG/1
2 CAPSULE, EXTENDED RELEASE ORAL ONCE
Refills: 0 | Status: DISCONTINUED | OUTPATIENT
Start: 2023-07-20 | End: 2023-07-20

## 2023-07-20 RX ADMIN — MORPHINE SULFATE 2 MILLIGRAM(S): 50 CAPSULE, EXTENDED RELEASE ORAL at 10:14

## 2023-07-20 RX ADMIN — ENOXAPARIN SODIUM 40 MILLIGRAM(S): 100 INJECTION SUBCUTANEOUS at 05:49

## 2023-07-20 RX ADMIN — MORPHINE SULFATE 4 MILLIGRAM(S): 50 CAPSULE, EXTENDED RELEASE ORAL at 08:03

## 2023-07-20 RX ADMIN — AMLODIPINE BESYLATE 5 MILLIGRAM(S): 2.5 TABLET ORAL at 05:47

## 2023-07-20 RX ADMIN — Medication 100 MILLIGRAM(S): at 17:07

## 2023-07-20 RX ADMIN — Medication 2 MILLIGRAM(S): at 08:03

## 2023-07-20 RX ADMIN — Medication 100 MILLIGRAM(S): at 05:50

## 2023-07-20 RX ADMIN — ENOXAPARIN SODIUM 40 MILLIGRAM(S): 100 INJECTION SUBCUTANEOUS at 17:06

## 2023-07-20 NOTE — PROGRESS NOTE ADULT - PROBLEM SELECTOR PLAN 2
- Hypoechoic liver lesions and hepatic steatosis on RUQ US  - Hepatitis panel (-)  - HIV pending  - CT A/P w/ liver protocol pending - Hypoechoic liver lesions and hepatic steatosis on RUQ US  - Hepatitis panel, HIV (-)  - CT A/P consistent with hepatic steatosis

## 2023-07-20 NOTE — OCCUPATIONAL THERAPY INITIAL EVALUATION ADULT - DIAGNOSIS, OT EVAL
Hepatic steatosis; Hx of peripheral neuropathy; Decreased functional mobility; Decreased ADL management

## 2023-07-20 NOTE — OCCUPATIONAL THERAPY INITIAL EVALUATION ADULT - LEVEL OF INDEPENDENCE: SUPINE/SIT, REHAB EVAL
Despite education and encouragement, pt deferred secondary to fatigue from sedation medication given for MRI earlier and s/p recent hygiene care. Will continue to follow pt while inpatient and progress with pt as tolerated.

## 2023-07-20 NOTE — OCCUPATIONAL THERAPY INITIAL EVALUATION ADULT - BED MOBILITY/TRANSFERS, PREVIOUS LEVEL OF FUNCTION, OT EVAL
Prior to hospitalization, pt reports she used no assistive device for household functional mobility and a rollator for community functional mobility./independent

## 2023-07-20 NOTE — CONSULT NOTE ADULT - PROBLEM SELECTOR RECOMMENDATION 9
- No acute neurosurgical intervention.   - Incidental cervical disc protrusions that does not explain clinical presentation  - Agree with completed MRIs with Gadolinium  - No objection to Lumbar puncture if needed  - Patient can follow up outpatient after discharge with Dr. Juan Ramon Tyler        case d/w Dr. Tyler with review of imaging

## 2023-07-20 NOTE — OCCUPATIONAL THERAPY INITIAL EVALUATION ADULT - GENERAL OBSERVATIONS, REHAB EVAL
Pt. received semisupine in bed. No acute distress. Pt lethargic but agreed to evaluation from Occupational Therapist as tolerated. +Heplock.

## 2023-07-20 NOTE — OCCUPATIONAL THERAPY INITIAL EVALUATION ADULT - MD ORDER
Occupational Therapy (OT) to evaluate and treat. Per TELLO Collier, pt is okay to participate in OT evaluation and perform activity as tolerated.
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
## 2023-07-20 NOTE — OCCUPATIONAL THERAPY INITIAL EVALUATION ADULT - NSOTDISCHREC_GEN_A_CORE
[facility]. However, please continue to follow progress notes as functional mobility limited this evaluation secondary to symptoms of fatigue. RN aware.

## 2023-07-20 NOTE — PROGRESS NOTE ADULT - SUBJECTIVE AND OBJECTIVE BOX
PROGRESS NOTE:   Authored by Julia Alexandra MD PGY-1  Internal Medicine      Patient is a 43y old  Female who presents with a chief complaint of Other symptom or sign involving musculoskeletal system     (19 Jul 2023 12:02)      SUBJECTIVE / OVERNIGHT EVENTS: ***, patient seen and examined at bedside    MEDICATIONS  (STANDING):  amLODIPine   Tablet 5 milliGRAM(s) Oral daily  enoxaparin Injectable 40 milliGRAM(s) SubCutaneous every 12 hours  polyethylene glycol 3350 17 Gram(s) Oral daily  pregabalin 100 milliGRAM(s) Oral two times a day    MEDICATIONS  (PRN):  diphenhydrAMINE Injectable 25 milliGRAM(s) IV Push every 6 hours PRN Rash and/or Itching      CAPILLARY BLOOD GLUCOSE        I&O's Summary      PHYSICAL EXAM:  Vital Signs Last 24 Hrs  T(C): 36.6 (20 Jul 2023 05:29), Max: 36.8 (19 Jul 2023 13:00)  T(F): 97.8 (20 Jul 2023 05:29), Max: 98.3 (19 Jul 2023 21:20)  HR: 100 (20 Jul 2023 08:18) (92 - 101)  BP: 131/95 (20 Jul 2023 08:18) (125/86 - 147/97)  BP(mean): --  RR: 17 (20 Jul 2023 08:18) (17 - 17)  SpO2: 100% (20 Jul 2023 08:18) (96% - 100%)    Parameters below as of 20 Jul 2023 08:18  Patient On (Oxygen Delivery Method): room air      CONSTITUTIONAL: Well-groomed, in no apparent distress  RESPIRATORY: Breathing comfortably; no dullness to percussion; lungs CTA without wheeze/rhonchi/rales  CARDIOVASCULAR: +S1S2, RRR, no M/G/R; pedal pulses full and symmetric; no lower extremity edema  GASTROINTESTINAL: No palpable masses or tenderness, +BS throughout, no rebound/guarding; no hepatosplenomegaly; no hernia palpated  SKIN: No rashes or ulcers noted  NEUROLOGIC: A+O x 3, CN II-XII intact; sensation intact in LEs b/l to light touch    LABS:                        11.3   3.34  )-----------( 219      ( 20 Jul 2023 05:53 )             34.2     07-20    139  |  103  |  9   ----------------------------<  94  4.2   |  26  |  0.59    Ca    9.5      20 Jul 2023 05:53  Phos  4.7     07-20  Mg     1.90     07-20    TPro  6.7  /  Alb  3.5  /  TBili  0.5  /  DBili  x   /  AST  84<H>  /  ALT  52<H>  /  AlkPhos  59  07-20       PROGRESS NOTE:   Authored by Julia Alexandra MD PGY-1  Internal Medicine      Patient is a 43y old  Female who presents with a chief complaint of Other symptom or sign involving musculoskeletal system     (19 Jul 2023 12:02)      SUBJECTIVE / OVERNIGHT EVENTS: NAEO. No new symptoms or complaints. Pt completed part of her MRI head and spine imaging today.    MEDICATIONS  (STANDING):  amLODIPine   Tablet 5 milliGRAM(s) Oral daily  enoxaparin Injectable 40 milliGRAM(s) SubCutaneous every 12 hours  polyethylene glycol 3350 17 Gram(s) Oral daily  pregabalin 100 milliGRAM(s) Oral two times a day    MEDICATIONS  (PRN):  diphenhydrAMINE Injectable 25 milliGRAM(s) IV Push every 6 hours PRN Rash and/or Itching      PHYSICAL EXAM:  Vital Signs Last 24 Hrs  T(C): 36.6 (20 Jul 2023 05:29), Max: 36.8 (19 Jul 2023 13:00)  T(F): 97.8 (20 Jul 2023 05:29), Max: 98.3 (19 Jul 2023 21:20)  HR: 100 (20 Jul 2023 08:18) (92 - 101)  BP: 131/95 (20 Jul 2023 08:18) (125/86 - 147/97)  RR: 17 (20 Jul 2023 08:18) (17 - 17)  SpO2: 100% (20 Jul 2023 08:18) (96% - 100%)    Parameters below as of 20 Jul 2023 08:18  Patient On (Oxygen Delivery Method): room air      CONSTITUTIONAL: Well-groomed, in no apparent distress  RESPIRATORY: Breathing comfortably; no dullness to percussion; lungs CTA without wheeze/rhonchi/rales  CARDIOVASCULAR: +S1S2, RRR, no M/G/R; pedal pulses full and symmetric; no lower extremity edema  GASTROINTESTINAL: No palpable masses or tenderness, +BS throughout, no rebound/guarding; no hepatosplenomegaly; no hernia palpated  SKIN: No rashes or ulcers noted  NEUROLOGIC: See previous note    LABS:                        11.3   3.34  )-----------( 219      ( 20 Jul 2023 05:53 )             34.2     07-20    139  |  103  |  9   ----------------------------<  94  4.2   |  26  |  0.59    Ca    9.5      20 Jul 2023 05:53  Phos  4.7     07-20  Mg     1.90     07-20    TPro  6.7  /  Alb  3.5  /  TBili  0.5  /  DBili  x   /  AST  84<H>  /  ALT  52<H>  /  AlkPhos  59  07-20

## 2023-07-20 NOTE — PROGRESS NOTE ADULT - ASSESSMENT
Ms Perez is a 44yo woman with a hx of peripheral neuropathy, sickle cell trait, asthma, and anemia who was admitted after suffering a fall with inability to get up due to 4 months of ongoing bilateral lower and upper extremity weakness and paresthesias. Neuro on board. EMG unremarkable. MRI head, C/T/L spine with minimal sedation unsuccessful as pt could not tolerate being still for prolonged time. Homocysteine slightly elevated however rest of lab workup unremarkable so far. Pt needs rehab on discharge as she is unable to walk.    Ms Perez is a 44yo woman with a hx of peripheral neuropathy, sickle cell trait, asthma, and anemia who was admitted after suffering a fall with inability to get up due to 4 months of ongoing bilateral lower and upper extremity weakness and paresthesias. Homocysteine slightly elevated however rest of lab workup unremarkable so far. EMG not consistent with CIDP. MRI head, C/T spine completed today showing C4-C6 disc protrusions causing ventral cord deformity. Neurosurgery consulted.

## 2023-07-20 NOTE — PROGRESS NOTE ADULT - ATTENDING COMMENTS
Pt had MR this morning -- completed head, c-spine, and t-spine without contrast. Head was unremarkable, C-spine showing C4-C5 and C5-C6 disc protrusion, t-spine unremarkable. C-spine findings may partially explain her symptoms. Will request Neurosurgery consult. If no contraindication will also proceed with LP to complete workup, as pt has also had vision changes OS that are not explained by imaging.

## 2023-07-20 NOTE — PROGRESS NOTE ADULT - PROBLEM SELECTOR PLAN 1
Ascending weakness and paresthesias, beginning in 3/2023  Possible CIDP vs Guillan [place] vs transmyelitis   EMG: No electrophysiologic evidence of a motor polyradiculoneuropathy - findings are not consistent with a diagnosis of CIDP    Plan:  -MR head, c/t/l spine with sedation unsuccessful  -MR under anesthesia may be an unnecessary risk for her at this time; will discuss with Neurology  -Labs largely unremarkable although further labs pending  -Lyrica 100mg BID  -Discussed with neuro. To follow up outpatient for further NCS and possible open MRI   -Will need to be discharge to [facility] as pt's ability to walk has been compromised Ascending weakness and paresthesias, beginning in 3/2023  Possible CIDP vs Guillan [place] vs transmyelitis   EMG: No electrophysiologic evidence of a motor polyradiculoneuropathy - findings are not consistent with a diagnosis of CIDP  MRI head/cervical/thoracic: Mid cervical degenerative disc disease, C4-C5 broad irregular right central and C5-C6 focal right foraminal disc protrusion (disc herniation) that cause ventral cord deformity.    Plan:  -Neurosurgery consult  -possible LP after neurosurgery consult if no contraindications  -Lyrica 100mg BID
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
## 2023-07-20 NOTE — OCCUPATIONAL THERAPY INITIAL EVALUATION ADULT - LIVES WITH, PROFILE
Pt. reports she lives with her 2 children in an apartment building with ramp available to enter. Once inside, pt. reports she has an elevator available to reach apartment located on the 3rd floor. Per pt., she has a bathtub in her bathroom.

## 2023-07-21 DIAGNOSIS — H53.8 OTHER VISUAL DISTURBANCES: ICD-10-CM

## 2023-07-21 LAB
ALBUMIN SERPL ELPH-MCNC: 3.6 G/DL — SIGNIFICANT CHANGE UP (ref 3.3–5)
ALP SERPL-CCNC: 55 U/L — SIGNIFICANT CHANGE UP (ref 40–120)
ALT FLD-CCNC: 51 U/L — HIGH (ref 4–33)
ANION GAP SERPL CALC-SCNC: 14 MMOL/L — SIGNIFICANT CHANGE UP (ref 7–14)
AST SERPL-CCNC: 94 U/L — HIGH (ref 4–32)
BILIRUB SERPL-MCNC: 0.5 MG/DL — SIGNIFICANT CHANGE UP (ref 0.2–1.2)
BUN SERPL-MCNC: 12 MG/DL — SIGNIFICANT CHANGE UP (ref 7–23)
CALCIUM SERPL-MCNC: 9.5 MG/DL — SIGNIFICANT CHANGE UP (ref 8.4–10.5)
CHLORIDE SERPL-SCNC: 101 MMOL/L — SIGNIFICANT CHANGE UP (ref 98–107)
CO2 SERPL-SCNC: 23 MMOL/L — SIGNIFICANT CHANGE UP (ref 22–31)
CREAT SERPL-MCNC: 0.53 MG/DL — SIGNIFICANT CHANGE UP (ref 0.5–1.3)
EGFR: 118 ML/MIN/1.73M2 — SIGNIFICANT CHANGE UP
GLUCOSE SERPL-MCNC: 91 MG/DL — SIGNIFICANT CHANGE UP (ref 70–99)
HCT VFR BLD CALC: 34.6 % — SIGNIFICANT CHANGE UP (ref 34.5–45)
HGB BLD-MCNC: 11.7 G/DL — SIGNIFICANT CHANGE UP (ref 11.5–15.5)
MAGNESIUM SERPL-MCNC: 1.8 MG/DL — SIGNIFICANT CHANGE UP (ref 1.6–2.6)
MCHC RBC-ENTMCNC: 29.8 PG — SIGNIFICANT CHANGE UP (ref 27–34)
MCHC RBC-ENTMCNC: 33.8 GM/DL — SIGNIFICANT CHANGE UP (ref 32–36)
MCV RBC AUTO: 88 FL — SIGNIFICANT CHANGE UP (ref 80–100)
NRBC # BLD: 0 /100 WBCS — SIGNIFICANT CHANGE UP (ref 0–0)
NRBC # FLD: 0 K/UL — SIGNIFICANT CHANGE UP (ref 0–0)
PHOSPHATE SERPL-MCNC: 4.2 MG/DL — SIGNIFICANT CHANGE UP (ref 2.5–4.5)
PLATELET # BLD AUTO: 257 K/UL — SIGNIFICANT CHANGE UP (ref 150–400)
POTASSIUM SERPL-MCNC: 3.8 MMOL/L — SIGNIFICANT CHANGE UP (ref 3.5–5.3)
POTASSIUM SERPL-SCNC: 3.8 MMOL/L — SIGNIFICANT CHANGE UP (ref 3.5–5.3)
PROT SERPL-MCNC: 6.9 G/DL — SIGNIFICANT CHANGE UP (ref 6–8.3)
RBC # BLD: 3.93 M/UL — SIGNIFICANT CHANGE UP (ref 3.8–5.2)
RBC # FLD: 14.4 % — SIGNIFICANT CHANGE UP (ref 10.3–14.5)
SODIUM SERPL-SCNC: 138 MMOL/L — SIGNIFICANT CHANGE UP (ref 135–145)
VIT B2 SERPL-MCNC: 155 UG/L — SIGNIFICANT CHANGE UP (ref 137–370)
WBC # BLD: 3.34 K/UL — LOW (ref 3.8–10.5)
WBC # FLD AUTO: 3.34 K/UL — LOW (ref 3.8–10.5)

## 2023-07-21 PROCEDURE — 99221 1ST HOSP IP/OBS SF/LOW 40: CPT

## 2023-07-21 PROCEDURE — 99233 SBSQ HOSP IP/OBS HIGH 50: CPT | Mod: GC

## 2023-07-21 RX ORDER — KETOROLAC TROMETHAMINE 30 MG/ML
15 SYRINGE (ML) INJECTION ONCE
Refills: 0 | Status: DISCONTINUED | OUTPATIENT
Start: 2023-07-21 | End: 2023-07-21

## 2023-07-21 RX ORDER — NYSTATIN/TRIAMCINOLONE ACET
1 OINTMENT (GRAM) TOPICAL
Refills: 0 | Status: DISCONTINUED | OUTPATIENT
Start: 2023-07-21 | End: 2023-08-25

## 2023-07-21 RX ADMIN — AMLODIPINE BESYLATE 5 MILLIGRAM(S): 2.5 TABLET ORAL at 06:46

## 2023-07-21 RX ADMIN — Medication 100 MILLIGRAM(S): at 06:46

## 2023-07-21 RX ADMIN — ENOXAPARIN SODIUM 40 MILLIGRAM(S): 100 INJECTION SUBCUTANEOUS at 06:46

## 2023-07-21 RX ADMIN — ENOXAPARIN SODIUM 40 MILLIGRAM(S): 100 INJECTION SUBCUTANEOUS at 17:52

## 2023-07-21 RX ADMIN — Medication 1 DROP(S): at 17:51

## 2023-07-21 RX ADMIN — Medication 150 MILLIGRAM(S): at 17:51

## 2023-07-21 RX ADMIN — Medication 15 MILLIGRAM(S): at 21:27

## 2023-07-21 RX ADMIN — Medication 15 MILLIGRAM(S): at 21:01

## 2023-07-21 RX ADMIN — Medication 1 APPLICATION(S): at 17:52

## 2023-07-21 RX ADMIN — POLYETHYLENE GLYCOL 3350 17 GRAM(S): 17 POWDER, FOR SOLUTION ORAL at 11:14

## 2023-07-21 NOTE — PROGRESS NOTE ADULT - PROBLEM SELECTOR PLAN 1
Ascending weakness and paresthesias, beginning in 3/2023  Possible CIDP vs Guillan [place] vs transmyelitis   EMG: No electrophysiologic evidence of a motor polyradiculoneuropathy - findings are not consistent with a diagnosis of CIDP  MRI head/cervical/thoracic: Mid cervical degenerative disc disease, C4-C5 broad irregular right central and C5-C6 focal right foraminal disc protrusion (disc herniation) that cause ventral cord deformity. Not consistent with clinical presentation per neurosurgery    Plan:  -Continue MRI imaging, pre medication with 2mg ativan, 4mg morphine, 50mg benadryl PO  -LP 7/24  -Lyrica 100mg BID

## 2023-07-21 NOTE — PROGRESS NOTE ADULT - ASSESSMENT
Ms Perez is a 44yo woman with a hx of peripheral neuropathy, sickle cell trait, asthma, and anemia who was admitted after suffering a fall with inability to get up due to 4 months of ongoing bilateral lower and upper extremity weakness and paresthesias. Homocysteine slightly elevated however rest of lab workup unremarkable so far. EMG not consistent with CIDP. MRI head, C/T spine completed 7/21 showing C4-C6 disc protrusions causing ventral cord deformity, not consistent with clinical presentation.

## 2023-07-21 NOTE — PROGRESS NOTE ADULT - SUBJECTIVE AND OBJECTIVE BOX
Neurology  Progress Note  07-21-23    Name: KAILASH FUENTES 43y    Review of Systems: ***    Medications:  MEDICATIONS  (STANDING):  amLODIPine   Tablet 5 milliGRAM(s) Oral daily  enoxaparin Injectable 40 milliGRAM(s) SubCutaneous every 12 hours  nystatin/triamcinolone Ointment 1 Application(s) Topical two times a day  polyethylene glycol 3350 17 Gram(s) Oral daily  pregabalin 100 milliGRAM(s) Oral two times a day    MEDICATIONS  (PRN):  diphenhydrAMINE Injectable 25 milliGRAM(s) IV Push every 6 hours PRN Rash and/or Itching      Vitals:  T(C): 36.8 (07-21-23 @ 06:24), Max: 36.9 (07-20-23 @ 20:57)  HR: 93 (07-21-23 @ 06:24) (93 - 96)  BP: 131/92 (07-21-23 @ 06:24) (123/79 - 131/92)  RR: 17 (07-21-23 @ 06:24) (17 - 17)  SpO2: 98% (07-21-23 @ 06:24) (98% - 100%)      Neurologic Examination: INCOMPLETE  ***    Labs:                        11.7   3.34  )-----------( 257      ( 21 Jul 2023 05:45 )             34.6     07-21    138  |  101  |  12  ----------------------------<  91  3.8   |  23  |  0.53    Ca    9.5      21 Jul 2023 05:45  Phos  4.2     07-21  Mg     1.80     07-21    TPro  6.9  /  Alb  3.6  /  TBili  0.5  /  DBili  x   /  AST  94<H>  /  ALT  51<H>  /  AlkPhos  55  07-21    CAPILLARY BLOOD GLUCOSE        LIVER FUNCTIONS - ( 21 Jul 2023 05:45 )  Alb: 3.6 g/dL / Pro: 6.9 g/dL / ALK PHOS: 55 U/L / ALT: 51 U/L / AST: 94 U/L / GGT: x               CSF:                  Radiology:  MR Head No Cont:  (20 Jul 2023 10:54)     Neurology  Progress Note  07-21-23    Name: KAILASH FUENTES 43y    Review of Systems: Over the last few days, patient reporting episode of autonomous RLE movement, describing it as her leg swinging back and forth like a pendulum while sitting at the edge of her bed, beyond her control, lasting for about a minute. Pendulum swinging along horizontal axis, but also apparently along vertical axis as well. Also reporting reduced vision in her left eye starting within the last couple of days. Also reporting that she is tolerating her pregabalin 100 mg BID, but still having pain, and is amenable to increasing her pregabalin dose. Also stating that she is anxious and wants an answer, or at least to have conditions ruled out, during her hospital admission. Patient reports claustrophobia while in the MRI due to the cage around her head, despite anxiolytics. Also reporting episode of facial asymmetry, showing us picture of the event on her phone.    Medications:  MEDICATIONS  (STANDING):  amLODIPine   Tablet 5 milliGRAM(s) Oral daily  enoxaparin Injectable 40 milliGRAM(s) SubCutaneous every 12 hours  nystatin/triamcinolone Ointment 1 Application(s) Topical two times a day  polyethylene glycol 3350 17 Gram(s) Oral daily  pregabalin 100 milliGRAM(s) Oral two times a day    MEDICATIONS  (PRN):  diphenhydrAMINE Injectable 25 milliGRAM(s) IV Push every 6 hours PRN Rash and/or Itching      Vitals:  T(C): 36.8 (07-21-23 @ 06:24), Max: 36.9 (07-20-23 @ 20:57)  HR: 93 (07-21-23 @ 06:24) (93 - 96)  BP: 131/92 (07-21-23 @ 06:24) (123/79 - 131/92)  RR: 17 (07-21-23 @ 06:24) (17 - 17)  SpO2: 98% (07-21-23 @ 06:24) (98% - 100%)      Neurologic Examination:      - Mental Status: Eyes open, attends to examiner; oriented to person, age, month, year, location, and situation; speech is fluent with intact naming, intact repetition, and follows 1-step and 3-step mid-line crossing commands.    - Cranial Nerves:  II: Visual fields are full to confrontation; pupils are equal, round, and reactive to light - did not appreciate APD during this encounter  III, IV, VI: Extraocular movements are intact without nystagmus  V: Facial sensation is intact in the V1-V3 distribution bilaterally  VII: Face is symmetric with normal eye closure and smile  VIII: Hearing is intact to conversation and finger rub  IX, X: Uvula is midline and soft palate rises symmetrically  XI: Shoulder shrug intact  XII: Tongue protrudes in the midline    - Motor/Strength Testing:  - B/l UE: no drifts  - B/l LE: unable to elevate b/l LE more than 30 degrees above the plane of the bed  - Negative Arndt's sign bilaterally                                   Right           Left  Deltoid                     5                 5  Biceps                      5                 4+ to 5 (variable effort, unclear motor exam)  Triceps                     5                 4+ to 5 (variable effort, unclear motor exam)  Hand                  5                 4+    Hip Flex                   5*                 5* (limited examination as patient using foot against the bed for leverage during HF testing)  Knee Ext	      4+                 5 (RLE KE limited d/t pain)  Dorsiflex                  4+                4+  Plantarflex               5                   5    - There is no pronator drift.   - Normal muscle bulk and tone throughout.    - Reflexes:   Bicep (C5/C6):                  R 1+ --- L 0+   Brachioradialis (C5/C6) :   R 2+ --- L 1+   Patella (L3/L4) :                 R 0+ --- L 0+   Ankle (S1) :                       R x+ --- L x+ (does not relax for testing, also, testing elicits shock-like sensation throughout)    - Plant responses down bilaterally.    - Sensory: Intact sensation to light touch in RUE. Diminished to light touch LUE ("50%"), RLE ("75%"), and LLE ("lowest sensation of all limbs")  - Coordination: Finger-nose-finger intact without ataxia or dysmetria.      Labs:                        11.7   3.34  )-----------( 257      ( 21 Jul 2023 05:45 )             34.6     07-21    138  |  101  |  12  ----------------------------<  91  3.8   |  23  |  0.53    Ca    9.5      21 Jul 2023 05:45  Phos  4.2     07-21  Mg     1.80     07-21    TPro  6.9  /  Alb  3.6  /  TBili  0.5  /  DBili  x   /  AST  94<H>  /  ALT  51<H>  /  AlkPhos  55  07-21    CAPILLARY BLOOD GLUCOSE    LIVER FUNCTIONS - ( 21 Jul 2023 05:45 )  Alb: 3.6 g/dL / Pro: 6.9 g/dL / ALK PHOS: 55 U/L / ALT: 51 U/L / AST: 94 U/L / GGT: x           Radiology:  MR Head No Cont:  (20 Jul 2023 10:54)   Neurology  Progress Note  07-21-23    Name: KAILASH FUENTES 43y    Review of Systems: Over the last few days, patient reporting episode of autonomous RLE movement, describing it as her leg swinging back and forth like a pendulum while sitting at the edge of her bed, beyond her control, lasting for about a minute. Pendulum swinging along horizontal axis, but also apparently along vertical axis as well. Also reporting reduced vision in her left eye starting within the last couple of days. Also reporting that she is tolerating her pregabalin 100 mg BID, but still having pain, and is amenable to increasing her pregabalin dose. Also stating that she is anxious and wants an answer, or at least to have conditions ruled out, during her hospital admission. Patient reports claustrophobia while in the MRI due to the cage around her head, despite anxiolytics. Also reporting episode of facial asymmetry, showing us picture of the event on her phone.    Medications:  MEDICATIONS  (STANDING):  amLODIPine   Tablet 5 milliGRAM(s) Oral daily  enoxaparin Injectable 40 milliGRAM(s) SubCutaneous every 12 hours  nystatin/triamcinolone Ointment 1 Application(s) Topical two times a day  polyethylene glycol 3350 17 Gram(s) Oral daily  pregabalin 100 milliGRAM(s) Oral two times a day    MEDICATIONS  (PRN):  diphenhydrAMINE Injectable 25 milliGRAM(s) IV Push every 6 hours PRN Rash and/or Itching      Vitals:  T(C): 36.8 (07-21-23 @ 06:24), Max: 36.9 (07-20-23 @ 20:57)  HR: 93 (07-21-23 @ 06:24) (93 - 96)  BP: 131/92 (07-21-23 @ 06:24) (123/79 - 131/92)  RR: 17 (07-21-23 @ 06:24) (17 - 17)  SpO2: 98% (07-21-23 @ 06:24) (98% - 100%)      Neurologic Examination:      - Mental Status: Eyes open, attends to examiner; oriented to person, age, month, year, location, and situation; speech is fluent with intact naming, intact repetition, and follows 1-step and 3-step mid-line crossing commands.    - Cranial Nerves:  II: Visual fields are full to confrontation; pupils are equal, round, and reactive to light - did not appreciate APD during this encounter  III, IV, VI: Extraocular movements are intact without nystagmus  V: Facial sensation is intact in the V1-V3 distribution bilaterally  VII: Face is symmetric with normal eye closure and smile  VIII: Hearing is intact to conversation and finger rub  IX, X: Uvula is midline and soft palate rises symmetrically  XI: Shoulder shrug intact  XII: Tongue protrudes in the midline    - Motor/Strength Testing:  - B/l UE: no drifts  - B/l LE: unable to elevate b/l LE more than 30 degrees above the plane of the bed  - Negative Arndt's sign bilaterally                                   Right           Left  Deltoid                     5                 5  Biceps                      5                 4+ to 5 (variable effort, unclear motor exam)  Triceps                     5                 4+ to 5 (variable effort, unclear motor exam)  Hand                  5                 4+    Hip Flex                   5*                 5* (limited examination as patient using foot against the bed for leverage during HF testing)  Knee Ext	      4+                 5 (RLE KE limited d/t pain)  Dorsiflex                  4+                4+  Plantarflex               5                   5    - There is no pronator drift.   - Normal muscle bulk and tone throughout.    - Reflexes:   Bicep (C5/C6):                  R 1+ --- L 0+   Brachioradialis (C5/C6) :   R 2+ --- L 1+   Patella (L3/L4) :                 R 0+ --- L 0+   Ankle (S1) :                       R x+ --- L x+ (does not relax for testing, also, testing elicits shock-like sensation throughout)    - Plant responses down bilaterally.    - Sensory: Intact sensation to light touch in RUE. Diminished to light touch LUE ("50%"), RLE ("75%"), and LLE ("lowest sensation of all limbs")  - Coordination: Finger-nose-finger intact without ataxia or dysmetria.      Labs:                        11.7   3.34  )-----------( 257      ( 21 Jul 2023 05:45 )             34.6     07-21    138  |  101  |  12  ----------------------------<  91  3.8   |  23  |  0.53    Ca    9.5      21 Jul 2023 05:45  Phos  4.2     07-21  Mg     1.80     07-21    TPro  6.9  /  Alb  3.6  /  TBili  0.5  /  DBili  x   /  AST  94<H>  /  ALT  51<H>  /  AlkPhos  55  07-21    CAPILLARY BLOOD GLUCOSE    LIVER FUNCTIONS - ( 21 Jul 2023 05:45 )  Alb: 3.6 g/dL / Pro: 6.9 g/dL / ALK PHOS: 55 U/L / ALT: 51 U/L / AST: 94 U/L / GGT: x           Radiology:    MR Head, Cervical, Thoracic No Cont (07.20.23 @ 10:54)    CONTRAST:    None. Gadolinium-enhanced images could not be obtained as   the patient was unable to complete the examination    BRAIN    BRAIN:   The brain demonstrates no abnormal signal intensity.   No   diffusion restriction is found in the brain.  No acute cerebral cortical   infarct is found.   No intracranial hemorrhage is recognized.  No mass   effect is found in the brain.    CSF SPACES:   The ventricles, sulci and basal cisterns appear   unremarkable.   Partially empty sella configuration is noted. Tiny   subcortical vertex Virchow Puma spaces are present.    VESSELS:   The vertebral and internal carotid arteries demonstrate   expected flow voids indicating their patency. The right vertebral artery   is dominant caliber. Shoulder    HEAD AND NECK STRUCTURES:   The orbits are unremarkable.  Paranasal   sinuses are clear.  The nasal cavity appears intact.  The central skull   base appears intact.  The nasopharynx is symmetric.  The temporal bones   appear clear of disease.  The calvarium appears unremarkable.    CERVICAL SPINE    Cervical vertebral alignment demonstrate focal reversal of the cervical   lordosis. This vertebral malalignment has an apex at C4. There is   associated slight retrolisthesis C4 on C5.   Facet joints appear aligned.     Cervical vertebral body heights are maintained. Cervical vertebral   marrow signal intensity is mildly heterogeneous. There is a mild   generalized pattern of low intermediate signal intensity on the   T1-weighted images. Degenerative endplate changes including marginal   osteophytes and discogenic sclerosis and edema are greatest at C4-C5.  No   osseous expansion or epidural disease is found..  No destructive bone   lesion is found.    Cervical intervertebral disc spaces demonstrate variable degeneration.   There is signal intensity loss on the long TR images greatest at the mid   cervical levels. Degenerative disc height loss appears greatest at C4-C5.   At the cervical intervertebral disc levels C2-C3, C3-C4, C6-C7 and C7-T1     no degenerative high-grade central canal compromise is recognized. At   C3-C4 tiny right central disc protrusion/osteophyte is noted. Neural   foramina at these levels remain intact.    At C4-C5 broad irregular right central disc protrusion mildly deforms   right ventral cord surface. No cord signal intensity change results. Disc   material and uncovertebral joint osteophytes contribute to low-grade   foraminal compromise.    At C5-C6 focal right foraminal disc protrusion deforms the right ventral   lateral corner of the cord and contributes with osteophytes to at least   moderate right foraminal compromise. The left neural foramen remains   intact. No cord signal intensity change is evident.    Cervical cord morphology demonstrates direct compromise by extrinsic disc   material as described above.  The cervical cord maintains intact signal   intensity   No focal intrinsic cord lesion is identified.  No cord   expansion or cord volume loss is recognized.    The visualized adjacent neck structures appear intact.  No neck mass is   recognized.  Paraspinal soft tissues appear intact.  Visualized lymph   nodes appear to be within physiologic size limits.    THORACIC SPINE    Thoracic vertebral alignment is preserved.  Thoracic vertebral body   heights are maintained.  Thoracic vertebral marrow signal intensity is   mildly heterogeneous. There is generalized low intermediate signal   intensity on the T1-weighted images.  No destructive bone lesion, osseous   expansion or epidural disease is found.  The paraspinal soft tissues   appear intact.    Thoracic intervertebral disc spaces demonstrate preserved disc heights.   Thoracic intervertebral disc signal intensity is maintained.   No   high-grade degenerative central canal compromise is recognized. Thoracic   neural foramina appear intact.    The thoracic cord morphology is preserved.  The thoracic cord maintains   intact signal intensity.    Artifact is noted that partly degrades evaluation in the lower thoracic   spine.      IMPRESSION:    1. BRAIN:   Unremarkable MR of the brain.    2. CERVICAL SPINE:   Reversal of the normal cervical lordosis suggestive   of disc pathology and / or muscle spasm. Mid cervical degenerative disc   disease includes At C4-C5 broad irregular right central and C5-C6 focal   right foraminal disc protrusion (disc herniation) that cause ventral cord   deformity. No intrinsic cervical cord lesion    3. THORACIC SPINE:   Limited incomplete examination demonstrates no   definite abnormality. No thoracic cord lesion    4. Generalized mild marrow heterogeneity and diminished signal intensity   on the T1-weighted images is nonspecific, possible chronic anemia    5. Patient was unable to tolerate completion of this examination.   Consider completion scan once tolerated by the patient or alternate means   of evaluation      Neurology  Progress Note  07-21-23    Name: KAILASH FUENTES 43y    Review of Systems: Over the last few days, patient reporting episode of autonomous RLE movement, describing it as her leg swinging back and forth like a pendulum while sitting at the edge of her bed, beyond her control, lasting for about a minute. Pendulum swinging along horizontal axis, but also apparently along vertical axis as well. Also reporting reduced vision in her left eye starting within the last couple of days. Also reporting that she is tolerating her pregabalin 100 mg BID, but still having pain, and is amenable to increasing her pregabalin dose. Also stating that she is anxious and wants an answer, or at least to have conditions ruled out, during her hospital admission. Patient reports claustrophobia while in the MRI due to the cage around her head, despite anxiolytics. Also reporting episode of facial asymmetry, showing us picture of the event on her phone.    Medications:  MEDICATIONS  (STANDING):  amLODIPine   Tablet 5 milliGRAM(s) Oral daily  enoxaparin Injectable 40 milliGRAM(s) SubCutaneous every 12 hours  nystatin/triamcinolone Ointment 1 Application(s) Topical two times a day  polyethylene glycol 3350 17 Gram(s) Oral daily  pregabalin 100 milliGRAM(s) Oral two times a day    MEDICATIONS  (PRN):  diphenhydrAMINE Injectable 25 milliGRAM(s) IV Push every 6 hours PRN Rash and/or Itching      Vitals:  T(C): 36.8 (07-21-23 @ 06:24), Max: 36.9 (07-20-23 @ 20:57)  HR: 93 (07-21-23 @ 06:24) (93 - 96)  BP: 131/92 (07-21-23 @ 06:24) (123/79 - 131/92)  RR: 17 (07-21-23 @ 06:24) (17 - 17)  SpO2: 98% (07-21-23 @ 06:24) (98% - 100%)      Neurologic Examination:      - Mental Status: Eyes open, attends to examiner; oriented to person, age, month, year, location, and situation; speech is fluent with intact naming, intact repetition, and follows 1-step and 3-step mid-line crossing commands.    - Cranial Nerves:  II: Visual fields are full to confrontation; pupils are equal, round, and reactive to light - did not appreciate APD during this encounter  III, IV, VI: Extraocular movements are intact without nystagmus  V: Facial sensation is intact in the V1-V3 distribution bilaterally  VII: Face is symmetric with normal eye closure and smile  VIII: Hearing is intact to conversation and finger rub  IX, X: Uvula is midline and soft palate rises symmetrically  XI: Shoulder shrug intact  XII: Tongue protrudes in the midline    - Motor/Strength Testing:  - B/l UE: no drifts  - B/l LE: unable to elevate b/l LE more than 30 degrees above the plane of the bed  - Negative Arndt's sign bilaterally                                   Right           Left  Deltoid                     5                 5  Biceps                      5                 4+ to 5 (variable effort, unclear motor exam)  Triceps                     5                 4+ to 5 (variable effort, unclear motor exam)  Hand                  5                 4+    Hip Flex                   5*                 5* (limited examination as patient using foot against the bed for leverage during HF testing)  Knee Ext	      4+                 5 (RLE KE limited d/t pain)  Dorsiflex                  4+                4+  Plantarflex               5                   5    - There is no pronator drift.   - Normal muscle bulk and tone throughout.    - Reflexes:   Bicep (C5/C6):                  R 1+ --- L 0+   Brachioradialis (C5/C6) :   R 2+ --- L 1+   Patella (L3/L4) :                 R 0+ --- L 0+   Ankle (S1) :                       R x+ --- L x+ (does not relax for testing, also, testing elicits shock-like sensation throughout)    - Plant responses down bilaterally.    - Sensory:   - Intact sensation to light touch in RUE. Diminished to light touch LUE ("50%"), RLE ("75%"), and LLE ("lowest sensation of all limbs")  - Loss of proprioception in bilateral distal PIP, did not test more proximally d/t complaint of shock-like sensation  - Coordination: Finger-nose-finger intact without ataxia or dysmetria.      Labs:                        11.7   3.34  )-----------( 257      ( 21 Jul 2023 05:45 )             34.6     07-21    138  |  101  |  12  ----------------------------<  91  3.8   |  23  |  0.53    Ca    9.5      21 Jul 2023 05:45  Phos  4.2     07-21  Mg     1.80     07-21    TPro  6.9  /  Alb  3.6  /  TBili  0.5  /  DBili  x   /  AST  94<H>  /  ALT  51<H>  /  AlkPhos  55  07-21    CAPILLARY BLOOD GLUCOSE    LIVER FUNCTIONS - ( 21 Jul 2023 05:45 )  Alb: 3.6 g/dL / Pro: 6.9 g/dL / ALK PHOS: 55 U/L / ALT: 51 U/L / AST: 94 U/L / GGT: x           Radiology:    MR Head, Cervical, Thoracic No Cont (07.20.23 @ 10:54)    CONTRAST:    None. Gadolinium-enhanced images could not be obtained as   the patient was unable to complete the examination    BRAIN    BRAIN:   The brain demonstrates no abnormal signal intensity.   No   diffusion restriction is found in the brain.  No acute cerebral cortical   infarct is found.   No intracranial hemorrhage is recognized.  No mass   effect is found in the brain.    CSF SPACES:   The ventricles, sulci and basal cisterns appear   unremarkable.   Partially empty sella configuration is noted. Tiny   subcortical vertex Virchow Puma spaces are present.    VESSELS:   The vertebral and internal carotid arteries demonstrate   expected flow voids indicating their patency. The right vertebral artery   is dominant caliber. Shoulder    HEAD AND NECK STRUCTURES:   The orbits are unremarkable.  Paranasal   sinuses are clear.  The nasal cavity appears intact.  The central skull   base appears intact.  The nasopharynx is symmetric.  The temporal bones   appear clear of disease.  The calvarium appears unremarkable.    CERVICAL SPINE    Cervical vertebral alignment demonstrate focal reversal of the cervical   lordosis. This vertebral malalignment has an apex at C4. There is   associated slight retrolisthesis C4 on C5.   Facet joints appear aligned.     Cervical vertebral body heights are maintained. Cervical vertebral   marrow signal intensity is mildly heterogeneous. There is a mild   generalized pattern of low intermediate signal intensity on the   T1-weighted images. Degenerative endplate changes including marginal   osteophytes and discogenic sclerosis and edema are greatest at C4-C5.  No   osseous expansion or epidural disease is found..  No destructive bone   lesion is found.    Cervical intervertebral disc spaces demonstrate variable degeneration.   There is signal intensity loss on the long TR images greatest at the mid   cervical levels. Degenerative disc height loss appears greatest at C4-C5.   At the cervical intervertebral disc levels C2-C3, C3-C4, C6-C7 and C7-T1     no degenerative high-grade central canal compromise is recognized. At   C3-C4 tiny right central disc protrusion/osteophyte is noted. Neural   foramina at these levels remain intact.    At C4-C5 broad irregular right central disc protrusion mildly deforms   right ventral cord surface. No cord signal intensity change results. Disc   material and uncovertebral joint osteophytes contribute to low-grade   foraminal compromise.    At C5-C6 focal right foraminal disc protrusion deforms the right ventral   lateral corner of the cord and contributes with osteophytes to at least   moderate right foraminal compromise. The left neural foramen remains   intact. No cord signal intensity change is evident.    Cervical cord morphology demonstrates direct compromise by extrinsic disc   material as described above.  The cervical cord maintains intact signal   intensity   No focal intrinsic cord lesion is identified.  No cord   expansion or cord volume loss is recognized.    The visualized adjacent neck structures appear intact.  No neck mass is   recognized.  Paraspinal soft tissues appear intact.  Visualized lymph   nodes appear to be within physiologic size limits.    THORACIC SPINE    Thoracic vertebral alignment is preserved.  Thoracic vertebral body   heights are maintained.  Thoracic vertebral marrow signal intensity is   mildly heterogeneous. There is generalized low intermediate signal   intensity on the T1-weighted images.  No destructive bone lesion, osseous   expansion or epidural disease is found.  The paraspinal soft tissues   appear intact.    Thoracic intervertebral disc spaces demonstrate preserved disc heights.   Thoracic intervertebral disc signal intensity is maintained.   No   high-grade degenerative central canal compromise is recognized. Thoracic   neural foramina appear intact.    The thoracic cord morphology is preserved.  The thoracic cord maintains   intact signal intensity.    Artifact is noted that partly degrades evaluation in the lower thoracic   spine.      IMPRESSION:    1. BRAIN:   Unremarkable MR of the brain.    2. CERVICAL SPINE:   Reversal of the normal cervical lordosis suggestive   of disc pathology and / or muscle spasm. Mid cervical degenerative disc   disease includes At C4-C5 broad irregular right central and C5-C6 focal   right foraminal disc protrusion (disc herniation) that cause ventral cord   deformity. No intrinsic cervical cord lesion    3. THORACIC SPINE:   Limited incomplete examination demonstrates no   definite abnormality. No thoracic cord lesion    4. Generalized mild marrow heterogeneity and diminished signal intensity   on the T1-weighted images is nonspecific, possible chronic anemia    5. Patient was unable to tolerate completion of this examination.   Consider completion scan once tolerated by the patient or alternate means   of evaluation

## 2023-07-21 NOTE — PROGRESS NOTE ADULT - ATTENDING COMMENTS
Pt reports similar symptoms today including persistent blurry vision OS. Will request Ophthalmology evaluation. Plan to complete MR imaging of head, C-spine, T-spine, L-spine with contrast. Pt felt more comfortable yesterday with premedication and did not develop drowsiness, altered mental status, or changes in vital signs. Pending MR results, anticipate LP on Monday per Procedure Team.

## 2023-07-21 NOTE — DISCHARGE NOTE PROVIDER - HOSPITAL COURSE
Ms Perez is a 44yo woman with a hx of peripheral neuropathy, sickle cell trait, asthma, and anemia who was admitted after suffering a fall with inability to get up due to 4 months of ongoing bilateral lower and upper extremity weakness and paresthesias. Homocysteine slightly elevated however rest of lab workup unremarkable so far. EMG not consistent with CIDP. MRI head, C/T spine completed 7/21 showing C4-C6 disc protrusions causing ventral cord deformity, not consistent with clinical presentation. Awaiting completion of further MRI imaging. Acute rehab on discharge and follow up with neurology for further nerve conduction studies.   Ms Perez is a 44yo woman with a hx of peripheral neuropathy, sickle cell trait, asthma, and anemia who was admitted after suffering a fall with inability to get up due to 4 months of ongoing bilateral lower and upper extremity weakness and paresthesias. Homocysteine slightly elevated however rest of lab workup unremarkable so far. EMG not consistent with CIDP. MRI head, C/T spine completed 7/21 showing C4-C6 disc protrusions causing ventral cord deformity, not consistent with clinical presentation. Her MRI and CSF studies were also unremarkable. Further, she also began expereincing muscle cramps, and shaking of her extremities with asssociated trismus. For this, an EEG was performed, but it did not reveal any seizure like activity. Psychiatry was also consulted     Acute rehab on discharge and follow up with neurology for further nerve conduction studies.   Ms Perez is a 42yo woman with a hx of peripheral neuropathy, sickle cell trait, asthma, and anemia who was admitted after suffering a fall with inability to get up due to 4 months of ongoing bilateral lower and upper extremity weakness and paresthesias. Homocysteine slightly elevated however rest of lab workup unremarkable so far. EMG not consistent with CIDP. MRI head, C/T spine completed 7/21 showing C4-C6 disc protrusions causing ventral cord deformity, not consistent with clinical presentation. Her MRI and CSF studies were also unremarkable. Further, she also began experiencing muscle cramps, and shaking of her extremities with asssociated trismus. For this, an EEG was performed, but it did not reveal any seizure like activity. A fatpad biopsy was also performed, but it did not reveal any findings for amyloidosis. Psychiatry was also consulted for psychogenic non-epilleptic seizures.      Acute rehab on discharge and follow up with neurology for further nerve conduction studies.   Ms Perez is a 42yo woman with a hx of peripheral neuropathy, sickle cell trait, asthma, and anemia who was admitted after suffering a fall with inability to get up due to 4 months of ongoing bilateral lower and upper extremity weakness and paresthesias. Homocysteine slightly elevated however rest of lab workup unremarkable so far. EMG not consistent with CIDP. MRI head, C/T spine completed 7/21 showing C4-C6 disc protrusions causing ventral cord deformity, not consistent with clinical presentation. Her MRI and CSF studies were also unremarkable. Further, she also began experiencing muscle cramps, and shaking of her extremities with asssociated trismus. For this, an EEG was performed, but it did not reveal any seizure like activity. A fatpad biopsy was also performed, but it did not reveal any findings for amyloidosis. Psychiatry was also consulted for psychogenic non-epilleptic seizures. They did not recommend any interventions inpatient, but did recommend outpatient follow up with a neurology.     She was discharged to subacute rehabilitation.      Acute rehab on discharge and follow up with neurology for further nerve conduction studies.   Ms Perez is a 42yo woman with a hx of peripheral neuropathy, sickle cell trait, asthma, and anemia who was admitted after suffering a fall with inability to get up due to 4 months of ongoing bilateral lower and upper extremity weakness and paresthesias. Homocysteine slightly elevated however rest of lab workup unremarkable so far. EMG not consistent with CIDP. MRI head, C/T spine completed 7/21 showing C4-C6 disc protrusions causing ventral cord deformity, not consistent with clinical presentation. Her MRI and CSF studies were also unremarkable. Further, she also began experiencing muscle cramps, and shaking of her extremities with asssociated trismus. For this, an EEG was performed, but it did not reveal any seizure like activity. A fatpad biopsy was also performed, but it did not reveal any findings for amyloidosis. Psychiatry was also consulted for psychogenic non-epilleptic seizures. They did not recommend any interventions inpatient, but did recommend outpatient follow up with a neurology. During her hospital stay, she also began experiencing heavy menses and for this, OBGYN was consulted.    Hospital Course:  Ms Perez is a 44yo woman with a hx of peripheral neuropathy, sickle cell trait, asthma, and anemia who was admitted after suffering a fall with inability to get up due to 4 months of ongoing bilateral lower and upper extremity weakness and paresthesias. Homocysteine slightly elevated however rest of lab workup unremarkable so far. EMG not consistent with CIDP. MRI head, C/T spine completed 7/21 showing C4-C6 disc protrusions causing ventral cord deformity, not consistent with clinical presentation. Her MRI and CSF studies were also unremarkable. Further, she also began experiencing muscle cramps, and shaking of her extremities with asssociated trismus. For this, an EEG was performed, but it did not reveal any seizure like activity. A fatpad biopsy was also performed, but it did not reveal any findings for amyloidosis. Psychiatry was also consulted for psychogenic non-epilleptic seizures. They did not recommend any interventions inpatient, but did recommend outpatient follow up with a neurology. During her hospital stay, she also began experiencing heavy menses and for this, OBGYN was consulted.     Important Medication Changes and Reason:    Active or Pending Issues Requiring Follow-up:    Advanced Directives:   [ ] Full code  [ ] DNR  [ ] Hospice    Discharge Diagnoses:           Hospital Course:  Ms Perez is a 44yo woman with a hx of peripheral neuropathy, sickle cell trait, asthma, and anemia who was admitted after suffering a fall with inability to get up due to 4 months of ongoing bilateral lower and upper extremity weakness and paresthesias. Homocysteine slightly elevated however rest of lab workup unremarkable so far. EMG not consistent with CIDP. MRI head, C/T spine completed 7/21 showing C4-C6 disc protrusions causing ventral cord deformity, not consistent with clinical presentation. Her MRI and CSF studies were also unremarkable. Further, she also began experiencing muscle cramps, and shaking of her extremities with associated trismus. For this, an EEG was performed, but it did not reveal any seizure like activity. A fatpad biopsy was also performed, but it did not reveal any findings for amyloidosis. Psychiatry was also consulted for psychogenic non-epilleptic seizures. They did not recommend any interventions inpatient, but did recommend outpatient follow up with a movement disorder specialist. During her hospital stay, she also began experiencing heavy menses and for this, OBGYN was consulted. Hgb dropped from 11 to 8 while inpatient and no blood transfusions were required. TVUS was performed which showed endometrium wnl as well as a left ovarian cyst 4.3cm with a single septation. Patient was found to have an elevated prolactin of 93.8 without any obvious source (MRI negative, no recent antipsychotics or related medications, TSH, ACE negative). OBGYN and Endocrine felt level does not require inpatient workup and recommended outpatient followup. Patient was also noted to have transient transaminitis with CT findings suggesting hepatic steatosis. LFTs improved throughout her hospital stay to a normal range. On the day of discharge patient was hemodynamically stable for discharge with close followup.     Important Medication Changes and Reason:        Active or Pending Issues Requiring Follow-up:  - Weakness, Paresthesias, and Psychogenic Non-Epileptic Seizures: Recommend follow up with movement disorder specialist who has experience with PNES.   - Transaminitis: Transient elevation with CT changes, recommend open MRI for further evaluation  - Hyperprolactinemia: Elevated level to 93.8 with normal TSH, ACE, FSH, LH, testosterone, MRI w/o macroadenoma, no meds, would recommend Endocrine referral outpatient for further workup (patients insurance is not accepted at [facility] and she requires referral)  - Amenorrhea: Heavy menses s/p 1 year period of no menses: Recommend follow up with OBGYN for further evaluation in outpatient setting    Advanced Directives:   [X] Full code  [ ] DNR  [ ] Hospice    Discharge Diagnoses:  Conversion Disorder?, Hyperprolactinemia         Hospital Course:  Ms Perez is a 42yo woman with a hx of peripheral neuropathy, sickle cell trait, asthma, and anemia who was admitted after suffering a fall with inability to get up due to 4 months of ongoing bilateral lower and upper extremity weakness and paresthesias. Homocysteine slightly elevated however rest of lab workup unremarkable so far. EMG not consistent with CIDP. MRI head, C/T spine completed 7/21 showing C4-C6 disc protrusions causing ventral cord deformity, not consistent with clinical presentation. Her MRI and CSF studies were also unremarkable. Further, she also began experiencing muscle cramps, and shaking of her extremities with associated trismus. For this, an EEG was performed, but it did not reveal any seizure like activity. A fatpad biopsy was also performed, but it did not reveal any findings for amyloidosis. Psychiatry was also consulted for psychogenic non-epilleptic seizures. They did not recommend any interventions inpatient, but did recommend outpatient follow up with a movement disorder specialist. During her hospital stay, she also began experiencing heavy menses and for this, OBGYN was consulted. Hgb dropped from 11 to 8 while inpatient and no blood transfusions were required. TVUS was performed which showed endometrium wnl as well as a left ovarian cyst 4.3cm with a single septation. Patient was found to have an elevated prolactin of 93.8 without any obvious source (MRI negative, no recent antipsychotics or related medications, TSH, ACE negative). OBGYN and Endocrine felt level does not require inpatient workup and recommended outpatient followup. Patient was also noted to have transient transaminitis with CT findings suggesting hepatic steatosis. LFTs improved throughout her hospital stay to a normal range. On the day of discharge patient was hemodynamically stable for discharge with close followup.     Important Medication Changes and Reason:  - started on Duloxetine 60mg for depression  - started on Methocarbamol 750mg and Pregabalin 200mg inpatient for paresthesias and muscle cramping    Active or Pending Issues Requiring Follow-up:  - Weakness, Paresthesias, and Psychogenic Non-Epileptic Seizures: Recommend follow up with movement disorder specialist who has experience with PNES. May also consider Psych eval  - Transaminitis: Transient elevation with CT changes, recommend open MRI for further evaluation  - Hyperprolactinemia: Elevated level to 93.8 with normal TSH, ACE, FSH, LH, testosterone, MRI w/o macroadenoma, no meds, would recommend Endocrine referral outpatient for further workup (patients insurance is not accepted at [facility] and she requires referral)  - Amenorrhea: Heavy menses s/p 1 year period of no menses: Recommend follow up with OBGYN for further evaluation in outpatient setting    Advanced Directives:   [X] Full code  [ ] DNR  [ ] Hospice    Discharge Diagnoses:  Conversion Disorder?, Hyperprolactinemia         Hospital Course:  Ms Perez is a 44yo woman with a hx of peripheral neuropathy, sickle cell trait, asthma, and anemia who was admitted after suffering a fall with inability to get up due to 4 months of ongoing bilateral lower and upper extremity weakness and paresthesias. Homocysteine slightly elevated however rest of lab workup unremarkable so far. EMG not consistent with CIDP. MRI head, C/T spine completed 7/21 showing C4-C6 disc protrusions causing ventral cord deformity, not consistent with clinical presentation. Her MRI and CSF studies were also unremarkable. Further, she also began experiencing muscle cramps, and shaking of her extremities with associated trismus. For this, an EEG was performed, but it did not reveal any seizure like activity. A fatpad biopsy was also performed, but it did not reveal any findings for amyloidosis. Psychiatry was also consulted for psychogenic non-epilleptic seizures. They did not recommend any interventions inpatient, but did recommend outpatient follow up with a movement disorder specialist. During her hospital stay, she also began experiencing heavy menses and for this, OBGYN was consulted. Hgb dropped from 11 to 8 while inpatient and no blood transfusions were required. TVUS was performed which showed endometrium wnl as well as a left ovarian cyst 4.3cm with a single septation. Patient was found to have an elevated prolactin of 93.8 without any obvious source (MRI negative, no recent antipsychotics or related medications, TSH, ACE negative). OBGYN and Endocrine felt level does not require inpatient workup and recommended outpatient followup. Patient was also noted to have transient transaminitis with CT findings suggesting hepatic steatosis. LFTs improved throughout her hospital stay to a normal range. On the day of discharge patient was hemodynamically stable for discharge with close followup.     Important Medication Changes and Reason:  - started on Duloxetine 60mg for depression  - started on Methocarbamol 750mg and Pregabalin 200mg inpatient for paresthesias and muscle cramping    Active or Pending Issues Requiring Follow-up:  - Weakness, Paresthesias, and Psychogenic Non-Epileptic Seizures: Recommend follow up with movement disorder specialist who has experience with PNES. May also consider Psych eval (patient prefers the term behavioral health)  - Transaminitis: Transient elevation with CT changes, recommend open MRI for further evaluation  - Hyperprolactinemia: Elevated level to 93.8 with normal TSH, ACE, FSH, LH, testosterone, MRI w/o macroadenoma, no meds, would recommend Endocrine referral outpatient for further workup (patients insurance is not accepted at [facility] and she requires referral)  - Amenorrhea: Heavy menses s/p 1 year period of no menses: Recommend follow up with OBGYN for further evaluation in outpatient setting    Advanced Directives:   [X] Full code  [ ] DNR  [ ] Hospice    Discharge Diagnoses:  Conversion Disorder?, Hyperprolactinemia

## 2023-07-21 NOTE — CONSULT NOTE ADULT - ASSESSMENT
Assessment and Recommendations:  43y female with a past medical history/ocular history of peripheral neuropathy, sickle cell trait, asthma, anemia, fall, paresthesias, spinal disc protrusions consulted for blurry vision left eye, found to have dry eye.    1) Blurry vision, OS  - patient has been having blurry vision for the past 2-3 days  - denies any eye pain, no transient vision loss, no greying out of vision, no flashes, no floaters, no eye pain on movements, no diplopia  - getting worked up for Guillan [place] vs transmyelitis vs CIDP  - VA 20/25 in the left eye, 20/20 in the right eye  - IOP within normal limits, EOMI, CVF full OU, no rAPD  - anterior exam with dry eye OS > OD  - dilated exam with incidental finding of myelinated nerve fibers in the left eye  - recommend artficial tears 4-5 times a day in both eyes for dry eye  - can follow up outpatient upon discharge at address below  - please reach out to ophthalmology with any acute vision changes - ophtho signing off    Seen and discussed with Dr. Hector, attending.    Outpatient Follow-up: Patient should follow-up with his/her ophthalmologist or with [facility] Department of Ophthalmology within 1 week of after discharge at:    [address]  [place] [address]  [phone]    Александр Nam MD, PGY3  Also available on Microsoft Teams

## 2023-07-21 NOTE — DISCHARGE NOTE PROVIDER - NSDCHC_MEDRECSTATUS_GEN_ALL_CORE
Admission Reconciliation is Not Complete  Discharge Reconciliation is Not Complete No Admission Reconciliation is Completed  Discharge Reconciliation is Completed detailed exam

## 2023-07-21 NOTE — DISCHARGE NOTE PROVIDER - NSDCFUADDAPPT_GEN_ALL_CORE_FT
[facility] Neurology  [phone]     1. Please follow up with your PCP within 1-2 weeks of discharge to schedule an MRI of your liver. If you do not have a PCP please make an appointment at the [facility] [place] [facility] clinic.   2. Please make an appointment with a Neurologist within 1-2 weeks of discharge. We recommend you see a Movement disorder specialist to better evaluate your current condition. If you do not have a neurologist, please make an appointment at [facility] Neurology at: [phone]  3. Please make an appointment with your OBGYN within 1-2 weeks of discharge to assess your menstrual bleeding.   4. Please ask your PCP to refer you to an Endocrinologist within 1-2 weeks of discharge to evaluate the cause of your elevated prolactin level   1. Please follow up with your PCP within 1-2 weeks of discharge to schedule an MRI of your liver. If you do not have a PCP please make an appointment at the [facility] [place] [facility] clinic.   2. Please make an appointment with a Neurologist within 1-2 weeks of discharge. We recommend you see a Movement disorder specialist to better evaluate your current condition. If you do not have a neurologist, please make an appointment at [facility] Neurology.   3. Please make an appointment with your OBGYN Dr. Jess Chowdhury within 1-2 weeks of discharge to assess your abnormal menstrual bleeding.   4. Please ask your PCP to refer you to an Endocrinologist within 1-2 weeks of discharge to evaluate the cause of your elevated prolactin level

## 2023-07-21 NOTE — DISCHARGE NOTE PROVIDER - NSFOLLOWUPCLINICS_GEN_ALL_ED_FT
[facility] at [place]  Internal Medicine  [address] [place] [address]  Phone: [phone]  Fax: [phone]    [facility]  Neurology Autoimmune Encephalitis  [address] [place] [address]  Phone: [phone]  Fax: [phone]     [facility] [place]  Internal Medicine  [address] [place] [address]  Phone: [phone]  Fax: [phone]    [facility]  Neurology  [address] [place] [id number]  Phone: [phone]  Fax:

## 2023-07-21 NOTE — PROGRESS NOTE ADULT - ATTENDING COMMENTS
I was present with the Resident during the key portions of the history and exam. I discussed the case with the Resident and agree with the findings and plan as documented in the Resident's note, unless noted below.  Continue medical management, neuro- check and fall precaution.  GI and DVT prophylaxis.  If you have any further questions, please do not hesitate to contact our team.  Thank you for allowing us to participate in this patient care. I was present with the Resident during the key portions of the history and exam. I discussed the case with the Resident and agree with the findings and plan as documented in the Resident's note, unless noted below.  Continue medical management, neuro- check and fall precaution.  Continue PT/OT   GI and DVT prophylaxis.  If you have any further questions, please do not hesitate to contact our team.  Thank you for allowing us to participate in this patient care.

## 2023-07-21 NOTE — DISCHARGE NOTE PROVIDER - NSDCCPTREATMENT_GEN_ALL_CORE_FT
PRINCIPAL PROCEDURE  Procedure: MRI cervical, thoracic and lumbar spine wo then w contrast  Findings and Treatment: IMPRESSION:  1. BRAIN:  No abnormal brain enhancement. Cerebral hemispheric frontal   subcortical white matter lesions are nonspecific. The pattern suggests   migraine disease. Subcortical lesions of ischemic white matter disease   could have a similar appearance. The differential diagnosis for these   lesions remains broad. This differential diagnosis would also include   other inflammatory, infectious, demyelinating, metabolic and conceivably   other etiologies. Please note, however, that the extent of this disease   is mild.  2. CERVICAL SPINE:   No abnormal cervical enhancement.  Reversal of the   cervical lordosis and moderate mid cervical degenerative disc disease are   findings unchanged from 7/20/2023, see prior report  3. THORACIC SPINE:   No abnormal thoracic enhancement. Thoracic cord   appears intact. T2-T3 shallow right foraminal disc protrusion  (disc   herniation) is associated with slight grade 1 anterior listhesis T2 on T3  4. LUMBAR SPINE:   No abnormallumbar enhancement.   Intact conus and   cauda equina. No significant lumbar disc pathology. Transitional   morphology thoracic lumbar junction  5. There is a generalized pattern of patchy signal heterogeneity on the   T1-weighted images that is nonspecific. This may reflect chronic anemia   or other systemic process although is without focal strongly suspicious   lesion        SECONDARY PROCEDURE  Procedure: CT abdomen w con  Findings and Treatment: FINDINGS:  LOWER CHEST: Within normal limits.  LIVER: The liver is markedly heterogeneous with multiple patchy areas of   hypodensities. Vessels are seen coursing through these hypodense areas.   No capsular retraction. The hepaticveins are patent. The main and the   left portal vein are patent. The right portal veins are not well   visualized.  BILE DUCTS: Normal caliber.  GALLBLADDER: Cholecystectomy.  SPLEEN: Within normal limits.  PANCREAS: Within normal limits.  ADRENALS:Within normal limits.  KIDNEYS/URETERS: Ill-defined linear hypodensities in the right kidney.   Left renal cysts.  BLADDER: Within normal limits.  REPRODUCTIVE ORGANS: Uterus and adnexa within normal limits.  BOWEL: No bowel obstruction. Appendixis normal.  PERITONEUM: No ascites.  VESSELS: Within normal limits.  RETROPERITONEUM/LYMPH NODES: No lymphadenopathy.  ABDOMINAL WALL: Within normal limits.  BONES: Within normal limits.  IMPRESSION:  Findings in the liver can be secondary to steatosis. This can be better   evaluated with MRI.  Suggestion of mild right pyelonephritis.       PRINCIPAL PROCEDURE  Procedure: MRI cervical, thoracic and lumbar spine wo then w contrast  Findings and Treatment: IMPRESSION:  1. BRAIN:  No abnormal brain enhancement. Cerebral hemispheric frontal   subcortical white matter lesions are nonspecific. The pattern suggests   migraine disease. Subcortical lesions of ischemic white matter disease   could have a similar appearance. The differential diagnosis for these   lesions remains broad. This differential diagnosis would also include   other inflammatory, infectious, demyelinating, metabolic and conceivably   other etiologies. Please note, however, that the extent of this disease   is mild.  2. CERVICAL SPINE:   No abnormal cervical enhancement.  Reversal of the   cervical lordosis and moderate mid cervical degenerative disc disease are   findings unchanged from 7/20/2023, see prior report  3. THORACIC SPINE:   No abnormal thoracic enhancement. Thoracic cord   appears intact. T2-T3 shallow right foraminal disc protrusion  (disc   herniation) is associated with slight grade 1 anterior listhesis T2 on T3  4. LUMBAR SPINE:   No abnormallumbar enhancement.   Intact conus and   cauda equina. No significant lumbar disc pathology. Transitional   morphology thoracic lumbar junction  5. There is a generalized pattern of patchy signal heterogeneity on the   T1-weighted images that is nonspecific. This may reflect chronic anemia   or other systemic process although is without focal strongly suspicious   lesion        SECONDARY PROCEDURE  Procedure: CT abdomen w con  Findings and Treatment: FINDINGS:  LOWER CHEST: Within normal limits.  LIVER: The liver is markedly heterogeneous with multiple patchy areas of   hypodensities. Vessels are seen coursing through these hypodense areas.   No capsular retraction. The hepaticveins are patent. The main and the   left portal vein are patent. The right portal veins are not well   visualized.  BILE DUCTS: Normal caliber.  GALLBLADDER: Cholecystectomy.  SPLEEN: Within normal limits.  PANCREAS: Within normal limits.  ADRENALS:Within normal limits.  KIDNEYS/URETERS: Ill-defined linear hypodensities in the right kidney.   Left renal cysts.  BLADDER: Within normal limits.  REPRODUCTIVE ORGANS: Uterus and adnexa within normal limits.  BOWEL: No bowel obstruction. Appendixis normal.  PERITONEUM: No ascites.  VESSELS: Within normal limits.  RETROPERITONEUM/LYMPH NODES: No lymphadenopathy.  ABDOMINAL WALL: Within normal limits.  BONES: Within normal limits.  IMPRESSION:  Findings in the liver can be secondary to steatosis. This can be better   evaluated with MRI.  Suggestion of mild right pyelonephritis.      Procedure: US transvaginal  Findings and Treatment: IMPRESSION:  Endometrium measuring up to 7 mm in thickness, which is within normal   limits in the setting of premenopausal/perimenopausal bleeding. However,   this is considered to be abnormally thickenedin the setting of   postmenopausal bleeding, which would warrant endometrial tissue sampling.  Left ovarian cyst measuring 4.3 cm with a single thin septation.  Nonvisualization of the right ovary

## 2023-07-21 NOTE — PROGRESS NOTE ADULT - SUBJECTIVE AND OBJECTIVE BOX
PROGRESS NOTE:   Authored by Julia Alexandra MD PGY-1  Internal Medicine      Patient is a 43y old female who presents with a chief complaint of Weakness in lower and upper extremities (20 Jul 2023 14:12)      SUBJECTIVE / OVERNIGHT EVENTS: NAEO. She continues to experience blurry vision in her left eye. Otherwise no other new symptoms. She was seen and examined at bedside    MEDICATIONS  (STANDING):  amLODIPine   Tablet 5 milliGRAM(s) Oral daily  enoxaparin Injectable 40 milliGRAM(s) SubCutaneous every 12 hours  nystatin/triamcinolone Ointment 1 Application(s) Topical two times a day  polyethylene glycol 3350 17 Gram(s) Oral daily  pregabalin 100 milliGRAM(s) Oral two times a day    MEDICATIONS  (PRN):  diphenhydrAMINE Injectable 25 milliGRAM(s) IV Push every 6 hours PRN Rash and/or Itching      PHYSICAL EXAM:  Vital Signs Last 24 Hrs  T(C): 36.8 (21 Jul 2023 06:24), Max: 36.9 (20 Jul 2023 20:57)  T(F): 98.2 (21 Jul 2023 06:24), Max: 98.4 (20 Jul 2023 20:57)  HR: 93 (21 Jul 2023 06:24) (93 - 96)  BP: 131/92 (21 Jul 2023 06:24) (123/79 - 131/92)  RR: 17 (21 Jul 2023 06:24) (17 - 17)  SpO2: 98% (21 Jul 2023 06:24) (98% - 100%)    Parameters below as of 21 Jul 2023 06:24  Patient On (Oxygen Delivery Method): room air      CONSTITUTIONAL: Well-groomed, in no apparent distress  RESPIRATORY: Breathing comfortably; no dullness to percussion; lungs CTA without wheeze/rhonchi/rales  CARDIOVASCULAR: +S1S2, RRR, no M/G/R; pedal pulses full and symmetric; no lower extremity edema  GASTROINTESTINAL: No palpable masses or tenderness, +BS throughout, no rebound/guarding; no hepatosplenomegaly; no hernia palpated  SKIN: No rashes or ulcers noted  NEUROLOGIC: A+O x 3, see last neuro exam    LABS:                        11.7   3.34  )-----------( 257      ( 21 Jul 2023 05:45 )             34.6     07-21    138  |  101  |  12  ----------------------------<  91  3.8   |  23  |  0.53    Ca    9.5      21 Jul 2023 05:45  Phos  4.2     07-21  Mg     1.80     07-21    TPro  6.9  /  Alb  3.6  /  TBili  0.5  /  DBili  x   /  AST  94<H>  /  ALT  51<H>  /  AlkPhos  55  07-21

## 2023-07-21 NOTE — PROGRESS NOTE ADULT - PROBLEM SELECTOR PLAN 3
- Hypoechoic liver lesions and hepatic steatosis on RUQ US  - Hepatitis panel, HIV (-)  - CT A/P consistent with hepatic steatosis
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
## 2023-07-21 NOTE — DISCHARGE NOTE PROVIDER - CARE PROVIDER_API CALL
Jess Chowdhury  Urogynecology  [phone] [place] [address], [facility]  [place] [address]  Phone: [phone]  Fax: [phone]  Follow Up Time:
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
## 2023-07-21 NOTE — DISCHARGE NOTE PROVIDER - NSFOLLOWUPCLINICSTOKEN_GEN_ALL_ED_FT
[id number]: || ||00\01||False;[id number]: || ||00\01||False; [id number]: || ||00\01||False;[id number]: || ||00\01||False;

## 2023-07-21 NOTE — DISCHARGE NOTE PROVIDER - NSDCCPCAREPLAN_GEN_ALL_CORE_FT
PRINCIPAL DISCHARGE DIAGNOSIS  Diagnosis: Weakness  Assessment and Plan of Treatment: You came to the hospital after you were experiencing weakness and decreased sensation in your legs. For this, an extensive workup was done, such as an MRI of the head and spine as well as a lumbar puncture. This did not yield any significant findings. You also began experiencing muscle cramps and shaking of your arms and legs during your hospital stay. For this, an EEG was performed twice, but this did not reveal any seizure like activity. You were also seen by our behavioral health specialists and they recommended continuing your therapy sessions outside of the hospital and seeing a neurologist outside of the hospital.      SECONDARY DISCHARGE DIAGNOSES  Diagnosis: Transaminitis  Assessment and Plan of Treatment: You were also found to have elevated liver enzymes during your hospital stay. On CT scan. you were also found to have some lesions on the liver. This was most likely due to fatty infiltrates. However, you should follow up with an MRI of the abdomen as it will allow a better look of these liver lesions. There are also options of obtaining an open MRI to better facilitate this.   You should also pursue lifestyle changes, such as eating healthy and being physically active as it will help your overall health.     PRINCIPAL DISCHARGE DIAGNOSIS  Diagnosis: Weakness  Assessment and Plan of Treatment: You came to the hospital after you were experiencing weakness and decreased sensation in your legs. For this, an extensive workup was done, such as an MRI of the head and spine as well as a lumbar puncture. This did not yield any significant findings. You also began experiencing muscle cramps and shaking of your arms and legs during your hospital stay. For this, an EEG was performed twice, but this did not reveal any seizure like activity. You were also seen by our behavioral health specialists and they recommended continuing your therapy sessions outside of the hospital and seeing a neurologist outside of the hospital.   TO DO  - Please follow up with a neurologist within 1-2 weeks of discharge to help further evaluate the cause of your symptoms. We recommend seeing a movement disorder specialist to better address your symptoms      SECONDARY DISCHARGE DIAGNOSES  Diagnosis: Transaminitis  Assessment and Plan of Treatment: You were also found to have elevated liver enzymes during your hospital stay. On imaging. you were also found to have some lesions on the liver that resembled diet-related changes. In order to get a better understanding of the lesions, we recommend an MRI once you leave the hospital. Fortunately, your liver labs improved during your hospital stay and were stable for outpatient followup with your doctor.   TO DO  - Please follow up with your doctor to obtain an MRI of the liver.   - Please consider pursuing lifestyle changes including eating a heart healthy diet full of greens and vegetables and limiting carbohydrate intake. We also recommend increasing your daily physical activity to help improve your overall health    Diagnosis: Elevated prolactin level  Assessment and Plan of Treatment: While in the hospital you were noted to have elevated prolactin levels. Prolactin is a hormone that may interact with your menstrual periods and may be the reason you did not have a menstrual period in over a year. A primary cause for the elevated level was not identified in the hospital and the Endocrinologist specialists felt it was at a stable level that could be worked up in the outpatient setting.   TO DO  - Please follow up with an Endocrinologist within 1-2 weeks after discharge to further assess your hormonal changes    Diagnosis: Heavy menses  Assessment and Plan of Treatment: While in the hospital you experienced significant menstrual bleeding after not having a period for a year.     PRINCIPAL DISCHARGE DIAGNOSIS  Diagnosis: Weakness  Assessment and Plan of Treatment: You came to the hospital after you were experiencing weakness and decreased sensation in your legs. For this, an extensive workup was done, such as an MRI of the head and spine as well as a lumbar puncture. This did not yield any significant findings. You also began experiencing muscle cramps and shaking of your arms and legs during your hospital stay. For this, an EEG was performed twice, but this did not reveal any seizure like activity. You were also seen by our behavioral health specialists and they recommended continuing your therapy sessions outside of the hospital and seeing a neurologist outside of the hospital.   TO DO  - Please follow up with a neurologist within 1-2 weeks of discharge to help further evaluate the cause of your symptoms. We recommend seeing a movement disorder specialist to better address your symptoms      SECONDARY DISCHARGE DIAGNOSES  Diagnosis: Transaminitis  Assessment and Plan of Treatment: You were also found to have elevated liver enzymes during your hospital stay. On imaging. you were also found to have some lesions on the liver that resembled diet-related changes. In order to get a better understanding of the lesions, we recommend an MRI once you leave the hospital. Fortunately, your liver labs improved during your hospital stay and were stable for outpatient followup with your doctor.   TO DO  - Please follow up with your doctor to obtain an MRI of the liver.   - Please consider pursuing lifestyle changes including eating a heart healthy diet full of greens and vegetables and limiting carbohydrate intake. We also recommend increasing your daily physical activity to help improve your overall health    Diagnosis: Elevated prolactin level  Assessment and Plan of Treatment: While in the hospital you were noted to have elevated prolactin levels. Prolactin is a hormone that may interact with your menstrual periods and may be the reason you did not have a menstrual period in over a year. A primary cause for the elevated level was not identified in the hospital and the Endocrinologist specialists felt it was at a stable level that could be worked up in the outpatient setting.   TO DO  - Please follow up with an Endocrinologist within 1-2 weeks after discharge to further assess your hormonal changes    Diagnosis: Heavy menses  Assessment and Plan of Treatment: While in the hospital you experienced significant menstrual bleeding after not having a period for a year. We were concerned so we spoke with our gynecology colleagues who evaluated your case. They collected labs which showed an elevated prolactin level, but otherwise other normal hormone levels.   Given that your blood levels stabilized and your bleeding stops, they feel you would be best served withoutpatient followup for your menses  Please follow up with Dr. Jess Chowdhury for further management of your gynechologic care.

## 2023-07-21 NOTE — DISCHARGE NOTE PROVIDER - NSDCMRMEDTOKEN_GEN_ALL_CORE_FT
DULoxetine 60 mg oral delayed release capsule: 1 cap(s) orally once a day (at bedtime)  methocarbamol 750 mg oral tablet: 1 tab(s) orally 3 times a day  nystatin-triamcinolone 100,000 units/g-0.1% topical ointment: 1 Apply topically to affected area 2 times a day  ocular lubricant ophthalmic solution: 1 drop(s) to each affected eye 4 times a day  polyethylene glycol 3350 oral powder for reconstitution: 17 gram(s) orally once a day  pregabalin 200 mg oral capsule: 1 cap(s) orally 2 times a day

## 2023-07-22 LAB
ALBUMIN SERPL ELPH-MCNC: 3.4 G/DL — SIGNIFICANT CHANGE UP (ref 3.3–5)
ALP SERPL-CCNC: 53 U/L — SIGNIFICANT CHANGE UP (ref 40–120)
ALT FLD-CCNC: 44 U/L — HIGH (ref 4–33)
ANION GAP SERPL CALC-SCNC: 11 MMOL/L — SIGNIFICANT CHANGE UP (ref 7–14)
AST SERPL-CCNC: 78 U/L — HIGH (ref 4–32)
BASOPHILS # BLD AUTO: 0.03 K/UL — SIGNIFICANT CHANGE UP (ref 0–0.2)
BASOPHILS NFR BLD AUTO: 0.9 % — SIGNIFICANT CHANGE UP (ref 0–2)
BILIRUB SERPL-MCNC: 0.4 MG/DL — SIGNIFICANT CHANGE UP (ref 0.2–1.2)
BUN SERPL-MCNC: 13 MG/DL — SIGNIFICANT CHANGE UP (ref 7–23)
CALCIUM SERPL-MCNC: 9.7 MG/DL — SIGNIFICANT CHANGE UP (ref 8.4–10.5)
CHLORIDE SERPL-SCNC: 104 MMOL/L — SIGNIFICANT CHANGE UP (ref 98–107)
CO2 SERPL-SCNC: 26 MMOL/L — SIGNIFICANT CHANGE UP (ref 22–31)
CREAT SERPL-MCNC: 0.51 MG/DL — SIGNIFICANT CHANGE UP (ref 0.5–1.3)
EGFR: 119 ML/MIN/1.73M2 — SIGNIFICANT CHANGE UP
EOSINOPHIL # BLD AUTO: 0.18 K/UL — SIGNIFICANT CHANGE UP (ref 0–0.5)
EOSINOPHIL NFR BLD AUTO: 5.4 % — SIGNIFICANT CHANGE UP (ref 0–6)
GLUCOSE SERPL-MCNC: 86 MG/DL — SIGNIFICANT CHANGE UP (ref 70–99)
HCT VFR BLD CALC: 35.3 % — SIGNIFICANT CHANGE UP (ref 34.5–45)
HGB BLD-MCNC: 11.5 G/DL — SIGNIFICANT CHANGE UP (ref 11.5–15.5)
IANC: 1.4 K/UL — LOW (ref 1.8–7.4)
IMM GRANULOCYTES NFR BLD AUTO: 0.6 % — SIGNIFICANT CHANGE UP (ref 0–0.9)
LYMPHOCYTES # BLD AUTO: 1.34 K/UL — SIGNIFICANT CHANGE UP (ref 1–3.3)
LYMPHOCYTES # BLD AUTO: 40.4 % — SIGNIFICANT CHANGE UP (ref 13–44)
MAGNESIUM SERPL-MCNC: 1.8 MG/DL — SIGNIFICANT CHANGE UP (ref 1.6–2.6)
MCHC RBC-ENTMCNC: 28.8 PG — SIGNIFICANT CHANGE UP (ref 27–34)
MCHC RBC-ENTMCNC: 32.6 GM/DL — SIGNIFICANT CHANGE UP (ref 32–36)
MCV RBC AUTO: 88.5 FL — SIGNIFICANT CHANGE UP (ref 80–100)
MONOCYTES # BLD AUTO: 0.35 K/UL — SIGNIFICANT CHANGE UP (ref 0–0.9)
MONOCYTES NFR BLD AUTO: 10.5 % — SIGNIFICANT CHANGE UP (ref 2–14)
NEUTROPHILS # BLD AUTO: 1.4 K/UL — LOW (ref 1.8–7.4)
NEUTROPHILS NFR BLD AUTO: 42.2 % — LOW (ref 43–77)
NRBC # BLD: 0 /100 WBCS — SIGNIFICANT CHANGE UP (ref 0–0)
NRBC # FLD: 0 K/UL — SIGNIFICANT CHANGE UP (ref 0–0)
NT-PROBNP SERPL-SCNC: <36 PG/ML — SIGNIFICANT CHANGE UP
PHOSPHATE SERPL-MCNC: 5 MG/DL — HIGH (ref 2.5–4.5)
PLATELET # BLD AUTO: 249 K/UL — SIGNIFICANT CHANGE UP (ref 150–400)
POTASSIUM SERPL-MCNC: 4 MMOL/L — SIGNIFICANT CHANGE UP (ref 3.5–5.3)
POTASSIUM SERPL-SCNC: 4 MMOL/L — SIGNIFICANT CHANGE UP (ref 3.5–5.3)
PROT SERPL-MCNC: 6.8 G/DL — SIGNIFICANT CHANGE UP (ref 6–8.3)
RBC # BLD: 3.99 M/UL — SIGNIFICANT CHANGE UP (ref 3.8–5.2)
RBC # FLD: 14.2 % — SIGNIFICANT CHANGE UP (ref 10.3–14.5)
SODIUM SERPL-SCNC: 141 MMOL/L — SIGNIFICANT CHANGE UP (ref 135–145)
WBC # BLD: 3.32 K/UL — LOW (ref 3.8–10.5)
WBC # FLD AUTO: 3.32 K/UL — LOW (ref 3.8–10.5)

## 2023-07-22 PROCEDURE — 99233 SBSQ HOSP IP/OBS HIGH 50: CPT | Mod: GC

## 2023-07-22 RX ORDER — KETOROLAC TROMETHAMINE 30 MG/ML
10 SYRINGE (ML) INJECTION ONCE
Refills: 0 | Status: DISCONTINUED | OUTPATIENT
Start: 2023-07-22 | End: 2023-07-26

## 2023-07-22 RX ORDER — LIDOCAINE 4 G/100G
1 CREAM TOPICAL DAILY
Refills: 0 | Status: DISCONTINUED | OUTPATIENT
Start: 2023-07-22 | End: 2023-08-25

## 2023-07-22 RX ORDER — MORPHINE SULFATE 50 MG/1
4 CAPSULE, EXTENDED RELEASE ORAL DAILY
Refills: 0 | Status: DISCONTINUED | OUTPATIENT
Start: 2023-07-22 | End: 2023-07-28

## 2023-07-22 RX ORDER — DIPHENHYDRAMINE HCL 50 MG
50 CAPSULE ORAL ONCE
Refills: 0 | Status: DISCONTINUED | OUTPATIENT
Start: 2023-07-22 | End: 2023-08-19

## 2023-07-22 RX ADMIN — LIDOCAINE 1 PATCH: 4 CREAM TOPICAL at 18:06

## 2023-07-22 RX ADMIN — Medication 150 MILLIGRAM(S): at 18:06

## 2023-07-22 RX ADMIN — Medication 1 APPLICATION(S): at 18:02

## 2023-07-22 RX ADMIN — Medication 1 APPLICATION(S): at 06:17

## 2023-07-22 RX ADMIN — AMLODIPINE BESYLATE 5 MILLIGRAM(S): 2.5 TABLET ORAL at 06:17

## 2023-07-22 RX ADMIN — Medication 1 DROP(S): at 18:02

## 2023-07-22 RX ADMIN — Medication 1 DROP(S): at 11:44

## 2023-07-22 RX ADMIN — Medication 1 DROP(S): at 06:18

## 2023-07-22 RX ADMIN — ENOXAPARIN SODIUM 40 MILLIGRAM(S): 100 INJECTION SUBCUTANEOUS at 18:03

## 2023-07-22 RX ADMIN — Medication 150 MILLIGRAM(S): at 06:17

## 2023-07-22 RX ADMIN — MORPHINE SULFATE 4 MILLIGRAM(S): 50 CAPSULE, EXTENDED RELEASE ORAL at 23:48

## 2023-07-22 RX ADMIN — ENOXAPARIN SODIUM 40 MILLIGRAM(S): 100 INJECTION SUBCUTANEOUS at 06:18

## 2023-07-22 NOTE — PROGRESS NOTE ADULT - ASSESSMENT
Ms Perez is a 42yo woman with a hx of peripheral neuropathy, sickle cell trait, asthma, and anemia who was admitted after suffering a fall with inability to get up due to 4 months of ongoing bilateral lower and upper extremity weakness and paresthesias. Homocysteine slightly elevated however rest of lab workup unremarkable so far. EMG not consistent with CIDP. MRI head, C/T spine completed 7/21 showing C4-C6 disc protrusions causing ventral cord deformity, not consistent with clinical presentation. Ms Perez is a 42yo woman with a hx of peripheral neuropathy, sickle cell trait, asthma, and anemia who was admitted after suffering a fall with inability to get up due to 4 months of ongoing bilateral lower and upper extremity weakness and paresthesias. Homocysteine slightly elevated however rest of lab workup unremarkable so far. EMG not consistent with CIDP. MRI head, C/T spine completed 7/21 showing C4-C6 disc protrusions causing ventral cord deformity, not consistent with clinical presentation. Awaiting completion of MRI imaging Ms Perez is a 42yo woman with a hx of peripheral neuropathy, sickle cell trait, asthma, and anemia who was admitted after suffering a fall with inability to get up due to 4 months of ongoing bilateral lower and upper extremity weakness and paresthesias. Homocysteine slightly elevated however rest of lab workup unremarkable so far. EMG not consistent with CIDP. MRI head, C/T spine completed 7/21 showing C4-C6 disc protrusions causing ventral cord deformity, not consistent with clinical presentation. Awaiting completion of MRI imaging.

## 2023-07-22 NOTE — PROGRESS NOTE ADULT - PROBLEM SELECTOR PLAN 2
Ophthalmology eval Ophthalmology evaluated pt and noted dry eyes    -continue artificial tears 4-5x daily

## 2023-07-22 NOTE — PROGRESS NOTE ADULT - PROBLEM SELECTOR PLAN 1
Ascending weakness and paresthesias, beginning in 3/2023  Possible CIDP vs Guillan [place] vs transmyelitis   EMG: No electrophysiologic evidence of a motor polyradiculoneuropathy - findings are not consistent with a diagnosis of CIDP  MRI head/cervical/thoracic: Mid cervical degenerative disc disease, C4-C5 broad irregular right central and C5-C6 focal right foraminal disc protrusion (disc herniation) that cause ventral cord deformity. Not consistent with clinical presentation per neurosurgery    Plan:  -Continue MRI imaging, pre medication with 2mg ativan, 4mg morphine, 50mg benadryl PO  -LP 7/24  -Lyrica 100mg BID Ascending weakness and paresthesias, beginning in 3/2023  Possible CIDP vs Guillan [place] vs transmyelitis   EMG: No electrophysiologic evidence of a motor polyradiculoneuropathy - findings are not consistent with a diagnosis of CIDP  MRI head/cervical/thoracic: Mid cervical degenerative disc disease, C4-C5 broad irregular right central and C5-C6 focal right foraminal disc protrusion (disc herniation) that cause ventral cord deformity. Not consistent with clinical presentation per neurosurgery    Plan:  -Awaiting further MRI imaging, pre medication with 2mg ativan, 4mg morphine, 50mg benadryl PO  -Pt would like one of her adult children to accompany her during MRI. Discussed with MRI tech supervisor, Percy. He informed me pt can have accompanying family member who can wait in the staging area.  -LP 7/24, hold lovenox night of 7/23 and morning of 7/24; see neurology note 7/23 for CSF studies to be obtained  -Lyrica increased to 150mg BID Ascending weakness and paresthesias, beginning in 3/2023  Possible CIDP vs Guillan [place] vs transmyelitis   EMG: No electrophysiologic evidence of a motor polyradiculoneuropathy - findings are not consistent with a diagnosis of CIDP  MRI head/cervical/thoracic: Mid cervical degenerative disc disease, C4-C5 broad irregular right central and C5-C6 focal right foraminal disc protrusion (disc herniation) that cause ventral cord deformity. Not consistent with clinical presentation per neurosurgery    Plan:  -Awaiting further MRI imaging, pre medication with 2mg ativan, 4mg morphine, 50mg benadryl PO (consider possible anesthesia as pt very anxious about being awake for her MRI)  -Pt would like one of her adult children to accompany her during MRI. Discussed with MRI tech supervisor, Percy. He informed me pt can have accompanying family member who can wait in the staging area.  -LP 7/24, hold lovenox night of 7/23 and morning of 7/24; see neurology note 7/23 for CSF studies to be obtained  -Lyrica increased to 150mg BID

## 2023-07-22 NOTE — PROGRESS NOTE ADULT - ATTENDING COMMENTS
Briefly, 42yo woman with bilateral lower and upper extremity weakness and paresthesias. Seen by neurology suspected to localize to peripheral nervous system. Non contrast MRI head, C/T spine completed 7/21 showing C4-C6 disc protrusions otherwise negative. Other workup so far negative (hepatitis panel, cryoglobulins, syphilis screen, ACE level, SPEP, UPEP, immunofixation electrophoresis, ANCA, Anti-gliadin Ab, anti-tissue transglutaminase Ab, Lyme, HgbA1c, serum free light chains, ds-DNA, RNP, Sm Ab). Neurology is recommending MRI head, C-spine, T-spine, L-spine with contrast and LP. LP to be performed on Monday 7/24. Patient not tolerating MRI, might need to be done under anesthesia.

## 2023-07-22 NOTE — PROGRESS NOTE ADULT - SUBJECTIVE AND OBJECTIVE BOX
PROGRESS NOTE:   Authored by Julia Alexandra MD PGY-1  Internal Medicine      Patient is a 43y old  Female who presents with a chief complaint of Weakness in lower and upper extremities (21 Jul 2023 15:59)      SUBJECTIVE / OVERNIGHT EVENTS: ***, patient seen and examined at bedside    MEDICATIONS  (STANDING):  amLODIPine   Tablet 5 milliGRAM(s) Oral daily  artificial  tears Solution 1 Drop(s) Both EYES four times a day  enoxaparin Injectable 40 milliGRAM(s) SubCutaneous every 12 hours  nystatin/triamcinolone Ointment 1 Application(s) Topical two times a day  polyethylene glycol 3350 17 Gram(s) Oral daily  pregabalin 150 milliGRAM(s) Oral two times a day    MEDICATIONS  (PRN):  diphenhydrAMINE Injectable 25 milliGRAM(s) IV Push every 6 hours PRN Rash and/or Itching      CAPILLARY BLOOD GLUCOSE        I&O's Summary      PHYSICAL EXAM:  Vital Signs Last 24 Hrs  T(C): 36.7 (22 Jul 2023 05:30), Max: 36.9 (21 Jul 2023 20:40)  T(F): 98.1 (22 Jul 2023 05:30), Max: 98.4 (21 Jul 2023 20:40)  HR: 84 (22 Jul 2023 05:30) (84 - 94)  BP: 134/95 (22 Jul 2023 05:30) (134/95 - 142/89)  BP(mean): --  RR: 16 (22 Jul 2023 05:30) (16 - 18)  SpO2: 98% (22 Jul 2023 05:30) (96% - 98%)    Parameters below as of 22 Jul 2023 05:30  Patient On (Oxygen Delivery Method): room air      CONSTITUTIONAL: Well-groomed, in no apparent distress  RESPIRATORY: Breathing comfortably; no dullness to percussion; lungs CTA without wheeze/rhonchi/rales  CARDIOVASCULAR: +S1S2, RRR, no M/G/R; pedal pulses full and symmetric; no lower extremity edema  GASTROINTESTINAL: No palpable masses or tenderness, +BS throughout, no rebound/guarding; no hepatosplenomegaly; no hernia palpated  SKIN: No rashes or ulcers noted  NEUROLOGIC: A+O x 3, CN II-XII intact; sensation intact in LEs b/l to light touch    LABS:                        11.5   3.32  )-----------( 249      ( 22 Jul 2023 06:05 )             35.3     07-21    138  |  101  |  12  ----------------------------<  91  3.8   |  23  |  0.53    Ca    9.5      21 Jul 2023 05:45  Phos  4.2     07-21  Mg     1.80     07-21    TPro  6.9  /  Alb  3.6  /  TBili  0.5  /  DBili  x   /  AST  94<H>  /  ALT  51<H>  /  AlkPhos  55  07-21         PROGRESS NOTE:   Authored by Julia Alexandra MD PGY-1  Internal Medicine      Patient is a 43y old  Female who presents with a chief complaint of Weakness in lower and upper extremities (21 Jul 2023 15:59)      SUBJECTIVE / OVERNIGHT EVENTS: Pt experience left leg pain overnight and was given toradol. Otherwise, she continues to experience the same symptoms as on presentation. She was seen and examined at bedside    MEDICATIONS  (STANDING):  amLODIPine   Tablet 5 milliGRAM(s) Oral daily  artificial  tears Solution 1 Drop(s) Both EYES four times a day  enoxaparin Injectable 40 milliGRAM(s) SubCutaneous every 12 hours  nystatin/triamcinolone Ointment 1 Application(s) Topical two times a day  polyethylene glycol 3350 17 Gram(s) Oral daily  pregabalin 150 milliGRAM(s) Oral two times a day    MEDICATIONS  (PRN):  diphenhydrAMINE Injectable 25 milliGRAM(s) IV Push every 6 hours PRN Rash and/or Itching      PHYSICAL EXAM:  Vital Signs Last 24 Hrs  T(C): 36.7 (22 Jul 2023 05:30), Max: 36.9 (21 Jul 2023 20:40)  T(F): 98.1 (22 Jul 2023 05:30), Max: 98.4 (21 Jul 2023 20:40)  HR: 84 (22 Jul 2023 05:30) (84 - 94)  BP: 134/95 (22 Jul 2023 05:30) (134/95 - 142/89)  BP(mean): --  RR: 16 (22 Jul 2023 05:30) (16 - 18)  SpO2: 98% (22 Jul 2023 05:30) (96% - 98%)    Parameters below as of 22 Jul 2023 05:30  Patient On (Oxygen Delivery Method): room air      CONSTITUTIONAL: Well-groomed, in no apparent distress  RESPIRATORY: Breathing comfortably; no dullness to percussion; lungs CTA without wheeze/rhonchi/rales  CARDIOVASCULAR: +S1S2, RRR, no M/G/R; pedal pulses full and symmetric; no lower extremity edema  GASTROINTESTINAL: No palpable masses or tenderness, +BS throughout, no rebound/guarding; no hepatosplenomegaly; no hernia palpated  SKIN: No rashes or ulcers noted  NEUROLOGIC: AAOx3, neuro exam unchanged from presentation    LABS:                        11.5   3.32  )-----------( 249      ( 22 Jul 2023 06:05 )             35.3     07-21    138  |  101  |  12  ----------------------------<  91  3.8   |  23  |  0.53    Ca    9.5      21 Jul 2023 05:45  Phos  4.2     07-21  Mg     1.80     07-21    TPro  6.9  /  Alb  3.6  /  TBili  0.5  /  DBili  x   /  AST  94<H>  /  ALT  51<H>  /  AlkPhos  55  07-21

## 2023-07-23 LAB
ALBUMIN SERPL ELPH-MCNC: 3.2 G/DL — LOW (ref 3.3–5)
ALP SERPL-CCNC: 62 U/L — SIGNIFICANT CHANGE UP (ref 40–120)
ALT FLD-CCNC: 42 U/L — HIGH (ref 4–33)
ANION GAP SERPL CALC-SCNC: 9 MMOL/L — SIGNIFICANT CHANGE UP (ref 7–14)
AST SERPL-CCNC: 72 U/L — HIGH (ref 4–32)
BASOPHILS # BLD AUTO: 0.03 K/UL — SIGNIFICANT CHANGE UP (ref 0–0.2)
BASOPHILS NFR BLD AUTO: 0.7 % — SIGNIFICANT CHANGE UP (ref 0–2)
BILIRUB SERPL-MCNC: 0.3 MG/DL — SIGNIFICANT CHANGE UP (ref 0.2–1.2)
BUN SERPL-MCNC: 15 MG/DL — SIGNIFICANT CHANGE UP (ref 7–23)
CALCIUM SERPL-MCNC: 9.1 MG/DL — SIGNIFICANT CHANGE UP (ref 8.4–10.5)
CHLORIDE SERPL-SCNC: 104 MMOL/L — SIGNIFICANT CHANGE UP (ref 98–107)
CO2 SERPL-SCNC: 24 MMOL/L — SIGNIFICANT CHANGE UP (ref 22–31)
CREAT SERPL-MCNC: 0.59 MG/DL — SIGNIFICANT CHANGE UP (ref 0.5–1.3)
EGFR: 115 ML/MIN/1.73M2 — SIGNIFICANT CHANGE UP
EOSINOPHIL # BLD AUTO: 0.2 K/UL — SIGNIFICANT CHANGE UP (ref 0–0.5)
EOSINOPHIL NFR BLD AUTO: 4.9 % — SIGNIFICANT CHANGE UP (ref 0–6)
GLUCOSE SERPL-MCNC: 83 MG/DL — SIGNIFICANT CHANGE UP (ref 70–99)
HCT VFR BLD CALC: 34.4 % — LOW (ref 34.5–45)
HGB BLD-MCNC: 11.3 G/DL — LOW (ref 11.5–15.5)
IANC: 1.96 K/UL — SIGNIFICANT CHANGE UP (ref 1.8–7.4)
IMM GRANULOCYTES NFR BLD AUTO: 0.2 % — SIGNIFICANT CHANGE UP (ref 0–0.9)
LYMPHOCYTES # BLD AUTO: 1.48 K/UL — SIGNIFICANT CHANGE UP (ref 1–3.3)
LYMPHOCYTES # BLD AUTO: 36.5 % — SIGNIFICANT CHANGE UP (ref 13–44)
MAGNESIUM SERPL-MCNC: 1.7 MG/DL — SIGNIFICANT CHANGE UP (ref 1.6–2.6)
MCHC RBC-ENTMCNC: 29 PG — SIGNIFICANT CHANGE UP (ref 27–34)
MCHC RBC-ENTMCNC: 32.8 GM/DL — SIGNIFICANT CHANGE UP (ref 32–36)
MCV RBC AUTO: 88.4 FL — SIGNIFICANT CHANGE UP (ref 80–100)
MONOCYTES # BLD AUTO: 0.38 K/UL — SIGNIFICANT CHANGE UP (ref 0–0.9)
MONOCYTES NFR BLD AUTO: 9.4 % — SIGNIFICANT CHANGE UP (ref 2–14)
NEUTROPHILS # BLD AUTO: 1.96 K/UL — SIGNIFICANT CHANGE UP (ref 1.8–7.4)
NEUTROPHILS NFR BLD AUTO: 48.3 % — SIGNIFICANT CHANGE UP (ref 43–77)
NRBC # BLD: 0 /100 WBCS — SIGNIFICANT CHANGE UP (ref 0–0)
NRBC # FLD: 0 K/UL — SIGNIFICANT CHANGE UP (ref 0–0)
PHOSPHATE SERPL-MCNC: 4.6 MG/DL — HIGH (ref 2.5–4.5)
PLATELET # BLD AUTO: 276 K/UL — SIGNIFICANT CHANGE UP (ref 150–400)
POTASSIUM SERPL-MCNC: 4 MMOL/L — SIGNIFICANT CHANGE UP (ref 3.5–5.3)
POTASSIUM SERPL-SCNC: 4 MMOL/L — SIGNIFICANT CHANGE UP (ref 3.5–5.3)
PROT SERPL-MCNC: 6.6 G/DL — SIGNIFICANT CHANGE UP (ref 6–8.3)
RBC # BLD: 3.89 M/UL — SIGNIFICANT CHANGE UP (ref 3.8–5.2)
RBC # FLD: 14.1 % — SIGNIFICANT CHANGE UP (ref 10.3–14.5)
SODIUM SERPL-SCNC: 137 MMOL/L — SIGNIFICANT CHANGE UP (ref 135–145)
WBC # BLD: 4.06 K/UL — SIGNIFICANT CHANGE UP (ref 3.8–10.5)
WBC # FLD AUTO: 4.06 K/UL — SIGNIFICANT CHANGE UP (ref 3.8–10.5)

## 2023-07-23 PROCEDURE — 99232 SBSQ HOSP IP/OBS MODERATE 35: CPT

## 2023-07-23 RX ADMIN — LIDOCAINE 1 PATCH: 4 CREAM TOPICAL at 11:18

## 2023-07-23 RX ADMIN — LIDOCAINE 1 PATCH: 4 CREAM TOPICAL at 23:41

## 2023-07-23 RX ADMIN — Medication 1 APPLICATION(S): at 17:02

## 2023-07-23 RX ADMIN — MORPHINE SULFATE 4 MILLIGRAM(S): 50 CAPSULE, EXTENDED RELEASE ORAL at 21:36

## 2023-07-23 RX ADMIN — AMLODIPINE BESYLATE 5 MILLIGRAM(S): 2.5 TABLET ORAL at 06:48

## 2023-07-23 RX ADMIN — Medication 1 DROP(S): at 11:18

## 2023-07-23 RX ADMIN — ENOXAPARIN SODIUM 40 MILLIGRAM(S): 100 INJECTION SUBCUTANEOUS at 06:48

## 2023-07-23 RX ADMIN — Medication 1 DROP(S): at 00:52

## 2023-07-23 RX ADMIN — Medication 150 MILLIGRAM(S): at 17:01

## 2023-07-23 RX ADMIN — MORPHINE SULFATE 4 MILLIGRAM(S): 50 CAPSULE, EXTENDED RELEASE ORAL at 21:56

## 2023-07-23 RX ADMIN — Medication 1 DROP(S): at 06:48

## 2023-07-23 RX ADMIN — Medication 1 APPLICATION(S): at 06:47

## 2023-07-23 RX ADMIN — Medication 150 MILLIGRAM(S): at 06:48

## 2023-07-23 NOTE — PROGRESS NOTE ADULT - ATTENDING COMMENTS
Briefly, 42yo woman with bilateral lower and upper extremity weakness and paresthesias. Seen by neurology suspected to localize to peripheral nervous system. Non contrast MRI head, C/T spine completed 7/21 showing C4-C6 disc protrusions otherwise negative. Other workup so far negative (hepatitis panel, cryoglobulins, syphilis screen, ACE level, SPEP, UPEP, immunofixation electrophoresis, ANCA, Anti-gliadin Ab, anti-tissue transglutaminase Ab, Lyme, HgbA1c, serum free light chains, ds-DNA, RNP, Sm Ab). Neurology is recommending MRI head, C-spine, T-spine, L-spine with contrast and LP. LP to be performed on Monday 7/24. Patient not tolerating MRI, might need to be done under anesthesia- coordinate on Monday.

## 2023-07-23 NOTE — PROGRESS NOTE ADULT - PROBLEM SELECTOR PLAN 1
Ascending weakness and paresthesias, beginning in 3/2023  Possible CIDP vs Guillan [place] vs transmyelitis   EMG: No electrophysiologic evidence of a motor polyradiculoneuropathy - findings are not consistent with a diagnosis of CIDP  MRI head/cervical/thoracic: Mid cervical degenerative disc disease, C4-C5 broad irregular right central and C5-C6 focal right foraminal disc protrusion (disc herniation) that cause ventral cord deformity. Not consistent with clinical presentation per neurosurgery    Plan:  -Awaiting further MRI imaging, pre medication with 2mg ativan, 4mg morphine, 50mg benadryl PO (consider possible anesthesia as pt very anxious about being awake for her MRI)  -Pt would like one of her adult children to accompany her during MRI. Discussed with MRI tech supervisor, Percy. He informed me pt can have accompanying family member who can wait in the staging area.  -LP 7/24, hold lovenox night of 7/23 and morning of 7/24; see neurology note 7/23 for CSF studies to be obtained  -Lyrica increased to 150mg BID Ascending weakness and paresthesias, beginning in 3/2023  Possible CIDP vs Guillan [place] vs transmyelitis   EMG: No electrophysiologic evidence of a motor polyradiculoneuropathy - findings are not consistent with a diagnosis of CIDP  MRI head/cervical/thoracic: Mid cervical degenerative disc disease, C4-C5 broad irregular right central and C5-C6 focal right foraminal disc protrusion (disc herniation) that cause ventral cord deformity. Not consistent with clinical presentation per neurosurgery    Plan:  -Awaiting further MRI imaging, pre medication with 2mg ativan, 4mg morphine, 50mg benadryl PO. (Pt would rather not attempt this due to have nausea and vomiting after previous pretreatments)  - Will consider possible anesthesia as pt very anxious about being awake for her MRI  - Pt would like one of her adult children to accompany her during MRI. Discussed with MRI tech supervisor, Percy. He informed me pt can have accompanying family member who can wait in the staging area.  -LP 7/24, hold lovenox night of 7/23 and morning of 7/24; see neurology note 7/23 for CSF studies to be obtained  -Lyrica increased to 150mg BID. Will continue  -

## 2023-07-23 NOTE — PROGRESS NOTE ADULT - SUBJECTIVE AND OBJECTIVE BOX
PROGRESS NOTE:   Authored by Errol Aceves MD  Pager:  [phone]    Patient is a 43y old  Female who presents with a chief complaint of Weakness in lower and upper extremities (22 Jul 2023 08:17)      SUBJECTIVE / OVERNIGHT EVENTS:    ADDITIONAL REVIEW OF SYSTEMS:    MEDICATIONS  (STANDING):  amLODIPine   Tablet 5 milliGRAM(s) Oral daily  artificial  tears Solution 1 Drop(s) Both EYES four times a day  enoxaparin Injectable 40 milliGRAM(s) SubCutaneous every 12 hours  ketorolac 10 milliGRAM(s) Oral once  lidocaine   4% Patch 1 Patch Transdermal daily  nystatin/triamcinolone Ointment 1 Application(s) Topical two times a day  polyethylene glycol 3350 17 Gram(s) Oral daily  pregabalin 150 milliGRAM(s) Oral two times a day    MEDICATIONS  (PRN):  diphenhydrAMINE 50 milliGRAM(s) Oral once PRN Anxiety  diphenhydrAMINE Injectable 25 milliGRAM(s) IV Push every 6 hours PRN Rash and/or Itching  LORazepam   Injectable 2 milliGRAM(s) IV Push once PRN Anxiety  morphine  - Injectable 4 milliGRAM(s) IV Push daily PRN Moderate Pain (4 - 6)      CAPILLARY BLOOD GLUCOSE        I&O's Summary      PHYSICAL EXAM:  Vital Signs Last 24 Hrs  T(C): 36.9 (22 Jul 2023 20:43), Max: 37.2 (22 Jul 2023 13:15)  T(F): 98.5 (22 Jul 2023 20:43), Max: 98.9 (22 Jul 2023 13:15)  HR: 96 (22 Jul 2023 20:43) (96 - 98)  BP: 142/90 (22 Jul 2023 20:43) (129/83 - 142/90)  BP(mean): --  RR: 17 (22 Jul 2023 20:43) (17 - 18)  SpO2: 98% (22 Jul 2023 20:43) (97% - 98%)    Parameters below as of 22 Jul 2023 20:43  Patient On (Oxygen Delivery Method): room air        Physical Exam:   GENERAL: NAD, lying in bed comfortably  CHEST/LUNG: Clear to auscultation bilaterally; No rales, rhonchi, wheezing, or rubs. Unlabored respirations  HEART: Regular rate and rhythm; No murmurs, rubs, or gallops  ABDOMEN: Bowel sounds present; Soft, Nontender, Nondistended. No hepatomegaly  EXTREMITIES:  2+ Peripheral Pulses, brisk capillary refill. No clubbing, cyanosis, or edema  NERVOUS SYSTEM:  Alert & Oriented X3, speech clear. CN II-XII intact. LLE sensation decrease, more than RLE. LUE sensation decreased, more than RUE.  MSK: LLE: 3/5 proximal, 4/5 distal     RLE: 4/5 proximal and distal    LUE: 4/5 proximal and distal   RUE 4/5 proximal and distal  SKIN: No rashes or lesions    LABS:                        11.5   3.32  )-----------( 249      ( 22 Jul 2023 06:05 )             35.3     07-22    141  |  104  |  13  ----------------------------<  86  4.0   |  26  |  0.51    Ca    9.7      22 Jul 2023 06:05  Phos  5.0     07-22  Mg     1.80     07-22    TPro  6.8  /  Alb  3.4  /  TBili  0.4  /  DBili  x   /  AST  78<H>  /  ALT  44<H>  /  AlkPhos  53  07-22          Urinalysis Basic - ( 22 Jul 2023 06:05 )    Color: x / Appearance: x / SG: x / pH: x  Gluc: 86 mg/dL / Ketone: x  / Bili: x / Urobili: x   Blood: x / Protein: x / Nitrite: x   Leuk Esterase: x / RBC: x / WBC x   Sq Epi: x / Non Sq Epi: x / Bacteria: x          RADIOLOGY & ADDITIONAL TESTS:  Results Reviewed:   Imaging Personally Reviewed:  Electrocardiogram Personally Reviewed:    COORDINATION OF CARE:  Care Discussed with Consultants/Other Providers [Y/N]:  Prior or Outpatient Records Reviewed [Y/N]:   PROGRESS NOTE:   Authored by Errol Aceves MD  Pager:  [phone]    Patient is a 43y old  Female who presents with a chief complaint of Weakness in lower and upper extremities (22 Jul 2023 08:17)      SUBJECTIVE / OVERNIGHT EVENTS: Patient still complaining of pain in the B/L lower extremities which intermittently shoot up the thigh and back. Denies any worsening weakness.     ADDITIONAL REVIEW OF SYSTEMS:    CONSTITUTIONAL: +weakness. No fevers or chills  RESPIRATORY: No cough, wheezing, hemoptysis; No shortness of breath  CARDIOVASCULAR: No chest pain or palpitations  GASTROINTESTINAL: No abdominal or epigastric pain. No nausea, vomiting, or hematemesis; No diarrhea or constipation. No melena or hematochezia.  SKIN: No itching, rashes      MEDICATIONS  (STANDING):  amLODIPine   Tablet 5 milliGRAM(s) Oral daily  artificial  tears Solution 1 Drop(s) Both EYES four times a day  enoxaparin Injectable 40 milliGRAM(s) SubCutaneous every 12 hours  ketorolac 10 milliGRAM(s) Oral once  lidocaine   4% Patch 1 Patch Transdermal daily  nystatin/triamcinolone Ointment 1 Application(s) Topical two times a day  polyethylene glycol 3350 17 Gram(s) Oral daily  pregabalin 150 milliGRAM(s) Oral two times a day    MEDICATIONS  (PRN):  diphenhydrAMINE 50 milliGRAM(s) Oral once PRN Anxiety  diphenhydrAMINE Injectable 25 milliGRAM(s) IV Push every 6 hours PRN Rash and/or Itching  LORazepam   Injectable 2 milliGRAM(s) IV Push once PRN Anxiety  morphine  - Injectable 4 milliGRAM(s) IV Push daily PRN Moderate Pain (4 - 6)      CAPILLARY BLOOD GLUCOSE        I&O's Summary      PHYSICAL EXAM:  Vital Signs Last 24 Hrs  T(C): 36.9 (22 Jul 2023 20:43), Max: 37.2 (22 Jul 2023 13:15)  T(F): 98.5 (22 Jul 2023 20:43), Max: 98.9 (22 Jul 2023 13:15)  HR: 96 (22 Jul 2023 20:43) (96 - 98)  BP: 142/90 (22 Jul 2023 20:43) (129/83 - 142/90)  BP(mean): --  RR: 17 (22 Jul 2023 20:43) (17 - 18)  SpO2: 98% (22 Jul 2023 20:43) (97% - 98%)    Parameters below as of 22 Jul 2023 20:43  Patient On (Oxygen Delivery Method): room air        Physical Exam:   GENERAL: NAD, lying in bed comfortably  CHEST/LUNG: Clear to auscultation bilaterally; No rales, rhonchi, wheezing, or rubs. Unlabored respirations  HEART: Regular rate and rhythm; No murmurs, rubs, or gallops  ABDOMEN: Bowel sounds present; Soft, Nontender, Nondistended. No hepatomegaly  EXTREMITIES:  2+ Peripheral Pulses, brisk capillary refill. No clubbing, cyanosis, or edema  NERVOUS SYSTEM:  Alert & Oriented X3, speech clear. CN II-XII intact. LLE sensation decrease, more than RLE. LUE sensation decreased, more than RUE.  MSK: LLE: 3/5 proximal, 4/5 distal     RLE: 4/5 proximal and distal    LUE: 4/5 proximal and distal   RUE 4/5 proximal and distal  SKIN: No rashes or lesions    LABS:                        11.5   3.32  )-----------( 249      ( 22 Jul 2023 06:05 )             35.3     07-22    141  |  104  |  13  ----------------------------<  86  4.0   |  26  |  0.51    Ca    9.7      22 Jul 2023 06:05  Phos  5.0     07-22  Mg     1.80     07-22    TPro  6.8  /  Alb  3.4  /  TBili  0.4  /  DBili  x   /  AST  78<H>  /  ALT  44<H>  /  AlkPhos  53  07-22          Urinalysis Basic - ( 22 Jul 2023 06:05 )    Color: x / Appearance: x / SG: x / pH: x  Gluc: 86 mg/dL / Ketone: x  / Bili: x / Urobili: x   Blood: x / Protein: x / Nitrite: x   Leuk Esterase: x / RBC: x / WBC x   Sq Epi: x / Non Sq Epi: x / Bacteria: x          RADIOLOGY & ADDITIONAL TESTS:  Results Reviewed:   Imaging Personally Reviewed:  Electrocardiogram Personally Reviewed:    COORDINATION OF CARE:  Care Discussed with Consultants/Other Providers [Y/N]:  Prior or Outpatient Records Reviewed [Y/N]:

## 2023-07-23 NOTE — PROGRESS NOTE ADULT - ASSESSMENT
Ms Perez is a 44yo woman with a hx of peripheral neuropathy, sickle cell trait, asthma, and anemia who was admitted after suffering a fall with inability to get up due to 4 months of ongoing bilateral lower and upper extremity weakness and paresthesias. Homocysteine slightly elevated however rest of lab workup unremarkable so far. EMG not consistent with CIDP. MRI head, C/T spine completed 7/21 showing C4-C6 disc protrusions causing ventral cord deformity, not consistent with clinical presentation. Awaiting completion of MRI imaging.

## 2023-07-24 LAB
ALBUMIN SERPL ELPH-MCNC: 3.7 G/DL — SIGNIFICANT CHANGE UP (ref 3.3–5)
ALP SERPL-CCNC: 57 U/L — SIGNIFICANT CHANGE UP (ref 40–120)
ALT FLD-CCNC: 46 U/L — HIGH (ref 4–33)
ANION GAP SERPL CALC-SCNC: 11 MMOL/L — SIGNIFICANT CHANGE UP (ref 7–14)
APTT BLD: 30.3 SEC — SIGNIFICANT CHANGE UP (ref 27–36.3)
AST SERPL-CCNC: 85 U/L — HIGH (ref 4–32)
BASOPHILS # BLD AUTO: 0.02 K/UL — SIGNIFICANT CHANGE UP (ref 0–0.2)
BASOPHILS NFR BLD AUTO: 0.5 % — SIGNIFICANT CHANGE UP (ref 0–2)
BILIRUB SERPL-MCNC: 0.4 MG/DL — SIGNIFICANT CHANGE UP (ref 0.2–1.2)
BUN SERPL-MCNC: 15 MG/DL — SIGNIFICANT CHANGE UP (ref 7–23)
CALCIUM SERPL-MCNC: 9.5 MG/DL — SIGNIFICANT CHANGE UP (ref 8.4–10.5)
CHLORIDE SERPL-SCNC: 103 MMOL/L — SIGNIFICANT CHANGE UP (ref 98–107)
CO2 SERPL-SCNC: 26 MMOL/L — SIGNIFICANT CHANGE UP (ref 22–31)
CREAT SERPL-MCNC: 0.56 MG/DL — SIGNIFICANT CHANGE UP (ref 0.5–1.3)
EGFR: 116 ML/MIN/1.73M2 — SIGNIFICANT CHANGE UP
EOSINOPHIL # BLD AUTO: 0.19 K/UL — SIGNIFICANT CHANGE UP (ref 0–0.5)
EOSINOPHIL NFR BLD AUTO: 4.8 % — SIGNIFICANT CHANGE UP (ref 0–6)
GLUCOSE SERPL-MCNC: 86 MG/DL — SIGNIFICANT CHANGE UP (ref 70–99)
HCT VFR BLD CALC: 37.2 % — SIGNIFICANT CHANGE UP (ref 34.5–45)
HGB BLD-MCNC: 11.8 G/DL — SIGNIFICANT CHANGE UP (ref 11.5–15.5)
IANC: 1.84 K/UL — SIGNIFICANT CHANGE UP (ref 1.8–7.4)
IMM GRANULOCYTES NFR BLD AUTO: 0.3 % — SIGNIFICANT CHANGE UP (ref 0–0.9)
INR BLD: 1.05 RATIO — SIGNIFICANT CHANGE UP (ref 0.88–1.16)
INTERPRETATION SERPL IFE-IMP: SIGNIFICANT CHANGE UP
LYMPHOCYTES # BLD AUTO: 1.49 K/UL — SIGNIFICANT CHANGE UP (ref 1–3.3)
LYMPHOCYTES # BLD AUTO: 37.9 % — SIGNIFICANT CHANGE UP (ref 13–44)
MAGNESIUM SERPL-MCNC: 1.9 MG/DL — SIGNIFICANT CHANGE UP (ref 1.6–2.6)
MCHC RBC-ENTMCNC: 28.5 PG — SIGNIFICANT CHANGE UP (ref 27–34)
MCHC RBC-ENTMCNC: 31.7 GM/DL — LOW (ref 32–36)
MCV RBC AUTO: 89.9 FL — SIGNIFICANT CHANGE UP (ref 80–100)
MONOCYTES # BLD AUTO: 0.38 K/UL — SIGNIFICANT CHANGE UP (ref 0–0.9)
MONOCYTES NFR BLD AUTO: 9.7 % — SIGNIFICANT CHANGE UP (ref 2–14)
NEUTROPHILS # BLD AUTO: 1.84 K/UL — SIGNIFICANT CHANGE UP (ref 1.8–7.4)
NEUTROPHILS NFR BLD AUTO: 46.8 % — SIGNIFICANT CHANGE UP (ref 43–77)
NRBC # BLD: 0 /100 WBCS — SIGNIFICANT CHANGE UP (ref 0–0)
NRBC # FLD: 0 K/UL — SIGNIFICANT CHANGE UP (ref 0–0)
PHOSPHATE SERPL-MCNC: 5.2 MG/DL — HIGH (ref 2.5–4.5)
PLATELET # BLD AUTO: 281 K/UL — SIGNIFICANT CHANGE UP (ref 150–400)
POTASSIUM SERPL-MCNC: 4.1 MMOL/L — SIGNIFICANT CHANGE UP (ref 3.5–5.3)
POTASSIUM SERPL-SCNC: 4.1 MMOL/L — SIGNIFICANT CHANGE UP (ref 3.5–5.3)
PROT SERPL-MCNC: 7.2 G/DL — SIGNIFICANT CHANGE UP (ref 6–8.3)
PROTHROM AB SERPL-ACNC: 12.2 SEC — SIGNIFICANT CHANGE UP (ref 10.5–13.4)
RBC # BLD: 4.14 M/UL — SIGNIFICANT CHANGE UP (ref 3.8–5.2)
RBC # FLD: 14 % — SIGNIFICANT CHANGE UP (ref 10.3–14.5)
SODIUM SERPL-SCNC: 140 MMOL/L — SIGNIFICANT CHANGE UP (ref 135–145)
WBC # BLD: 3.93 K/UL — SIGNIFICANT CHANGE UP (ref 3.8–10.5)
WBC # FLD AUTO: 3.93 K/UL — SIGNIFICANT CHANGE UP (ref 3.8–10.5)

## 2023-07-24 PROCEDURE — 99233 SBSQ HOSP IP/OBS HIGH 50: CPT | Mod: GC

## 2023-07-24 RX ORDER — ACETAMINOPHEN 500 MG
650 TABLET ORAL ONCE
Refills: 0 | Status: COMPLETED | OUTPATIENT
Start: 2023-07-24 | End: 2023-07-24

## 2023-07-24 RX ADMIN — LIDOCAINE 1 PATCH: 4 CREAM TOPICAL at 12:03

## 2023-07-24 RX ADMIN — AMLODIPINE BESYLATE 5 MILLIGRAM(S): 2.5 TABLET ORAL at 05:27

## 2023-07-24 RX ADMIN — MORPHINE SULFATE 4 MILLIGRAM(S): 50 CAPSULE, EXTENDED RELEASE ORAL at 10:28

## 2023-07-24 RX ADMIN — Medication 200 MILLIGRAM(S): at 18:05

## 2023-07-24 RX ADMIN — Medication 150 MILLIGRAM(S): at 05:26

## 2023-07-24 RX ADMIN — Medication 1 APPLICATION(S): at 18:06

## 2023-07-24 RX ADMIN — LIDOCAINE 1 PATCH: 4 CREAM TOPICAL at 20:00

## 2023-07-24 RX ADMIN — MORPHINE SULFATE 4 MILLIGRAM(S): 50 CAPSULE, EXTENDED RELEASE ORAL at 10:43

## 2023-07-24 NOTE — PROGRESS NOTE ADULT - PROBLEM SELECTOR PLAN 1
Ascending weakness and paresthesias, beginning in 3/2023  Possible CIDP vs Guillan [place] vs transmyelitis   EMG: No electrophysiologic evidence of a motor polyradiculoneuropathy - findings are not consistent with a diagnosis of CIDP  MRI head/cervical/thoracic: Mid cervical degenerative disc disease, C4-C5 broad irregular right central and C5-C6 focal right foraminal disc protrusion (disc herniation) that cause ventral cord deformity. Not consistent with clinical presentation per neurosurgery    Plan:  - LP scheduled on 7/25/23  -Awaiting further MRI imaging, pre medication with 2mg ativan, 4mg morphine, 50mg benadryl PO. (Pt would rather not attempt this due to have nausea and vomiting after previous pretreatments)  - Will consider possible anesthesia as pt very anxious about being awake for her MRI  - Pt would like one of her adult children to accompany her during MRI. Discussed with MRI tech supervisor, Percy. He informed me pt can have accompanying family member who can wait in the staging area.  -LP 7/24, hold lovenox night of 7/23 and morning of 7/24; see neurology note 7/23 for CSF studies to be obtained  -Lyrica increased to 150mg BID. Will continue Ascending weakness and paresthesias, beginning in 3/2023  Possible CIDP vs Guillan [place] vs transmyelitis   EMG: No electrophysiologic evidence of a motor polyradiculoneuropathy - findings are not consistent with a diagnosis of CIDP  MRI head/cervical/thoracic: Mid cervical degenerative disc disease, C4-C5 broad irregular right central and C5-C6 focal right foraminal disc protrusion (disc herniation) that cause ventral cord deformity. Not consistent with clinical presentation per neurosurgery    Plan:  - LP scheduled on 7/25/23  -Awaiting further MRI imaging, pre medication with 2mg ativan, 4mg morphine, 50mg benadryl PO. (Pt would rather not attempt this due to have nausea and vomiting after previous pretreatments)  - Consulted  MRI and anesthesia for MRI under sedation

## 2023-07-24 NOTE — PROGRESS NOTE ADULT - SUBJECTIVE AND OBJECTIVE BOX
PROGRESS NOTE:   Authored by Dr. Bharat Venegas MD (PGY-1).     Patient is a 43y old  Female who presents with a chief complaint of Weakness in lower and upper extremities (23 Jul 2023 07:36)      SUBJECTIVE / OVERNIGHT EVENTS:  No acute events overnight. This morning she is complaining of pain.     MEDICATIONS  (STANDING):  amLODIPine   Tablet 5 milliGRAM(s) Oral daily  artificial  tears Solution 1 Drop(s) Both EYES four times a day  ketorolac 10 milliGRAM(s) Oral once  lidocaine   4% Patch 1 Patch Transdermal daily  nystatin/triamcinolone Ointment 1 Application(s) Topical two times a day  polyethylene glycol 3350 17 Gram(s) Oral daily  pregabalin 150 milliGRAM(s) Oral two times a day    MEDICATIONS  (PRN):  diphenhydrAMINE 50 milliGRAM(s) Oral once PRN Anxiety  diphenhydrAMINE Injectable 25 milliGRAM(s) IV Push every 6 hours PRN Rash and/or Itching  LORazepam   Injectable 2 milliGRAM(s) IV Push once PRN Anxiety  morphine  - Injectable 4 milliGRAM(s) IV Push daily PRN Moderate Pain (4 - 6)      CAPILLARY BLOOD GLUCOSE        I&O's Summary      PHYSICAL EXAM:  Vital Signs Last 24 Hrs  T(C): 36.5 (24 Jul 2023 05:23), Max: 36.7 (23 Jul 2023 22:38)  T(F): 97.7 (24 Jul 2023 05:23), Max: 98.1 (23 Jul 2023 22:38)  HR: 99 (24 Jul 2023 10:26) (83 - 99)  BP: 123/77 (24 Jul 2023 10:26) (111/71 - 123/77)  BP(mean): --  RR: 16 (24 Jul 2023 05:23) (16 - 19)  SpO2: 99% (24 Jul 2023 05:23) (94% - 99%)    Parameters below as of 24 Jul 2023 05:23  Patient On (Oxygen Delivery Method): room air        CONSTITUTIONAL: NAD, well-developed  HEET: MMM, EOMI, PERRLA  NECK: supple  RESPIRATORY: Normal respiratory effort; lungs are clear to auscultation bilaterally  CARDIOVASCULAR: Regular rate and rhythm, normal S1 and S2, no murmur/rub/gallop; No lower extremity edema; Peripheral pulses are 2+ bilaterally  ABDOMEN: Nontender to palpation, normoactive bowel sounds, no rebound/guarding; No hepatosplenomegaly  MUSCULOSKELETAL: no clubbing or cyanosis of digits; no joint swelling or tenderness to palpation  NEURO: 4/5 strength on upper and lower extremity flexion and extension b/l. Decreasing sensation in lower extremities distally to proximally   PSYCH: A+O to person, place, and time; affect appropriate  SKIN: No rash    LABS:                        11.8   3.93  )-----------( 281      ( 24 Jul 2023 07:41 )             37.2     07-24    140  |  103  |  15  ----------------------------<  86  4.1   |  26  |  0.56    Ca    9.5      24 Jul 2023 07:41  Phos  5.2     07-24  Mg     1.90     07-24    TPro  7.2  /  Alb  3.7  /  TBili  0.4  /  DBili  x   /  AST  85<H>  /  ALT  46<H>  /  AlkPhos  57  07-24    PT/INR - ( 24 Jul 2023 10:51 )   PT: 12.2 sec;   INR: 1.05 ratio         PTT - ( 24 Jul 2023 10:51 )  PTT:30.3 sec      Urinalysis Basic - ( 24 Jul 2023 07:41 )    Color: x / Appearance: x / SG: x / pH: x  Gluc: 86 mg/dL / Ketone: x  / Bili: x / Urobili: x   Blood: x / Protein: x / Nitrite: x   Leuk Esterase: x / RBC: x / WBC x   Sq Epi: x / Non Sq Epi: x / Bacteria: x          Tele Reviewed:    RADIOLOGY & ADDITIONAL TESTS:  Results Reviewed:   Imaging Personally Reviewed:  Electrocardiogram Personally Reviewed:

## 2023-07-24 NOTE — PROGRESS NOTE ADULT - ATTENDING COMMENTS
43F with PMH of sickle cell trait, asthma, peripheral neuropathy who presents s/p fall with ongoing weakness/paresthesia. Neuro on board. Further workup in progress to eval for other neuro culprits.    Pt seen at bedside. States weakness not any better. Reports neuropathy that has not improved as well. States efforts to pre-medicate her for MRI the last time was unsuccessful and she was not able to complete the study. Declined to provceed further w/o MRI under anesthesia. On exam, CN intact. Strength fairly full on RIGHT LE, diminished on LLE. Variable changes to LE sensations w/o clear  dermatomal distribution. Labs w/ transaminase elevation, but improved since admission.    #LE weakness    -MRI pending. Need to coordinate w/ anesthesia.  -LP pending. Will coordinate w/ procedure team.  -Will f/u neuro. PT follow up.    Rest of plan as above.

## 2023-07-24 NOTE — PROGRESS NOTE ADULT - PROBLEM SELECTOR PLAN 3
- Hypoechoic liver lesions and hepatic steatosis on RUQ US  - Hepatitis panel, HIV (-)  - CT A/P consistent with hepatic steatosis - Hypoechoic liver lesions and hepatic steatosis on RUQ US  - Hepatitis panel, HIV (-)  - CT A/P consistent with hepatic steatosis  - AST/ALT (85/46) 7/24/23

## 2023-07-24 NOTE — CHART NOTE - NSCHARTNOTEFT_GEN_A_CORE
Procedure team following for diagnostic lumbar puncture.  Coags not sent /resulted for this morning or last 24-48h. Otherwise, pt optimized for procedure from our perspective.   - PTT/PT/INR/CBC ordered for 7/25am  - Plan for attempt at procedure 7/25am. BMI suggestive that subcutaneous tissues may limit bedside accessibility of LP space but will attempt with US landmark identification.  - If not successful, may need NeuroIR.     Mitchell Verdin MD PGY-3  Case D/W Dr. Varela

## 2023-07-25 LAB
ALBUMIN SERPL ELPH-MCNC: 3.4 G/DL — SIGNIFICANT CHANGE UP (ref 3.3–5)
ALP SERPL-CCNC: 57 U/L — SIGNIFICANT CHANGE UP (ref 40–120)
ALT FLD-CCNC: 51 U/L — HIGH (ref 4–33)
ANION GAP SERPL CALC-SCNC: 11 MMOL/L — SIGNIFICANT CHANGE UP (ref 7–14)
APTT BLD: 29.6 SEC — SIGNIFICANT CHANGE UP (ref 27–36.3)
AST SERPL-CCNC: 88 U/L — HIGH (ref 4–32)
BASOPHILS # BLD AUTO: 0.03 K/UL — SIGNIFICANT CHANGE UP (ref 0–0.2)
BASOPHILS NFR BLD AUTO: 0.8 % — SIGNIFICANT CHANGE UP (ref 0–2)
BILIRUB SERPL-MCNC: 0.4 MG/DL — SIGNIFICANT CHANGE UP (ref 0.2–1.2)
BUN SERPL-MCNC: 13 MG/DL — SIGNIFICANT CHANGE UP (ref 7–23)
CALCIUM SERPL-MCNC: 9.7 MG/DL — SIGNIFICANT CHANGE UP (ref 8.4–10.5)
CHLORIDE SERPL-SCNC: 103 MMOL/L — SIGNIFICANT CHANGE UP (ref 98–107)
CO2 SERPL-SCNC: 25 MMOL/L — SIGNIFICANT CHANGE UP (ref 22–31)
CREAT SERPL-MCNC: 0.5 MG/DL — SIGNIFICANT CHANGE UP (ref 0.5–1.3)
EGFR: 119 ML/MIN/1.73M2 — SIGNIFICANT CHANGE UP
EOSINOPHIL # BLD AUTO: 0.25 K/UL — SIGNIFICANT CHANGE UP (ref 0–0.5)
EOSINOPHIL NFR BLD AUTO: 6.4 % — HIGH (ref 0–6)
GLUCOSE SERPL-MCNC: 94 MG/DL — SIGNIFICANT CHANGE UP (ref 70–99)
HCT VFR BLD CALC: 34 % — LOW (ref 34.5–45)
HGB BLD-MCNC: 11.2 G/DL — LOW (ref 11.5–15.5)
IANC: 2.01 K/UL — SIGNIFICANT CHANGE UP (ref 1.8–7.4)
IMM GRANULOCYTES NFR BLD AUTO: 0.3 % — SIGNIFICANT CHANGE UP (ref 0–0.9)
INR BLD: 1.11 RATIO — SIGNIFICANT CHANGE UP (ref 0.88–1.16)
LYMPHOCYTES # BLD AUTO: 1.27 K/UL — SIGNIFICANT CHANGE UP (ref 1–3.3)
LYMPHOCYTES # BLD AUTO: 32.3 % — SIGNIFICANT CHANGE UP (ref 13–44)
MAGNESIUM SERPL-MCNC: 1.8 MG/DL — SIGNIFICANT CHANGE UP (ref 1.6–2.6)
MCHC RBC-ENTMCNC: 29.2 PG — SIGNIFICANT CHANGE UP (ref 27–34)
MCHC RBC-ENTMCNC: 32.9 GM/DL — SIGNIFICANT CHANGE UP (ref 32–36)
MCV RBC AUTO: 88.8 FL — SIGNIFICANT CHANGE UP (ref 80–100)
MONOCYTES # BLD AUTO: 0.36 K/UL — SIGNIFICANT CHANGE UP (ref 0–0.9)
MONOCYTES NFR BLD AUTO: 9.2 % — SIGNIFICANT CHANGE UP (ref 2–14)
NEUTROPHILS # BLD AUTO: 2.01 K/UL — SIGNIFICANT CHANGE UP (ref 1.8–7.4)
NEUTROPHILS NFR BLD AUTO: 51 % — SIGNIFICANT CHANGE UP (ref 43–77)
NRBC # BLD: 0 /100 WBCS — SIGNIFICANT CHANGE UP (ref 0–0)
NRBC # FLD: 0 K/UL — SIGNIFICANT CHANGE UP (ref 0–0)
PHOSPHATE SERPL-MCNC: 4.4 MG/DL — SIGNIFICANT CHANGE UP (ref 2.5–4.5)
PLATELET # BLD AUTO: 272 K/UL — SIGNIFICANT CHANGE UP (ref 150–400)
POTASSIUM SERPL-MCNC: 4 MMOL/L — SIGNIFICANT CHANGE UP (ref 3.5–5.3)
POTASSIUM SERPL-SCNC: 4 MMOL/L — SIGNIFICANT CHANGE UP (ref 3.5–5.3)
PROT SERPL-MCNC: 6.5 G/DL — SIGNIFICANT CHANGE UP (ref 6–8.3)
PROTHROM AB SERPL-ACNC: 12.9 SEC — SIGNIFICANT CHANGE UP (ref 10.5–13.4)
RBC # BLD: 3.83 M/UL — SIGNIFICANT CHANGE UP (ref 3.8–5.2)
RBC # FLD: 14 % — SIGNIFICANT CHANGE UP (ref 10.3–14.5)
SODIUM SERPL-SCNC: 139 MMOL/L — SIGNIFICANT CHANGE UP (ref 135–145)
VIT B1 SERPL-MCNC: 71.5 NMOL/L — SIGNIFICANT CHANGE UP (ref 66.5–200)
WBC # BLD: 3.93 K/UL — SIGNIFICANT CHANGE UP (ref 3.8–10.5)
WBC # FLD AUTO: 3.93 K/UL — SIGNIFICANT CHANGE UP (ref 3.8–10.5)

## 2023-07-25 PROCEDURE — 99232 SBSQ HOSP IP/OBS MODERATE 35: CPT | Mod: GC

## 2023-07-25 RX ORDER — ACETAMINOPHEN 500 MG
1000 TABLET ORAL ONCE
Refills: 0 | Status: COMPLETED | OUTPATIENT
Start: 2023-07-25 | End: 2023-07-25

## 2023-07-25 RX ORDER — MORPHINE SULFATE 50 MG/1
2 CAPSULE, EXTENDED RELEASE ORAL ONCE
Refills: 0 | Status: DISCONTINUED | OUTPATIENT
Start: 2023-07-25 | End: 2023-07-25

## 2023-07-25 RX ADMIN — Medication 1 APPLICATION(S): at 17:26

## 2023-07-25 RX ADMIN — Medication 1 APPLICATION(S): at 08:30

## 2023-07-25 RX ADMIN — LIDOCAINE 1 PATCH: 4 CREAM TOPICAL at 00:00

## 2023-07-25 RX ADMIN — Medication 200 MILLIGRAM(S): at 06:17

## 2023-07-25 RX ADMIN — Medication 400 MILLIGRAM(S): at 23:11

## 2023-07-25 RX ADMIN — Medication 1 DROP(S): at 11:57

## 2023-07-25 RX ADMIN — Medication 200 MILLIGRAM(S): at 17:26

## 2023-07-25 RX ADMIN — MORPHINE SULFATE 2 MILLIGRAM(S): 50 CAPSULE, EXTENDED RELEASE ORAL at 20:47

## 2023-07-25 RX ADMIN — MORPHINE SULFATE 2 MILLIGRAM(S): 50 CAPSULE, EXTENDED RELEASE ORAL at 21:02

## 2023-07-25 RX ADMIN — AMLODIPINE BESYLATE 5 MILLIGRAM(S): 2.5 TABLET ORAL at 08:29

## 2023-07-25 RX ADMIN — Medication 1000 MILLIGRAM(S): at 23:41

## 2023-07-25 NOTE — PROGRESS NOTE ADULT - PROBLEM SELECTOR PLAN 5
No hemorrhage on CTH  No bruising  Left knee pain    Plan:  -Tylenol prn Full Thickness Lip Wedge Repair (Flap) Text: Given the location of the defect and the proximity to free margins a full thickness wedge repair was deemed most appropriate.  Using a sterile surgical marker, the appropriate repair was drawn incorporating the defect and placing the expected incisions perpendicular to the vermilion border.  The vermilion border was also meticulously outlined to ensure appropriate reapproximation during the repair.  The area thus outlined was incised through and through with a #15 scalpel blade.  The muscularis and dermis were reaproximated with deep sutures following hemostasis. Care was taken to realign the vermilion border before proceeding with the superficial closure.  Once the vermilion was realigned the superfical and mucosal closure was finished.

## 2023-07-25 NOTE — PROGRESS NOTE ADULT - PROBLEM SELECTOR PLAN 1
Ascending weakness and paresthesias, beginning in 3/2023  Possible CIDP vs Guillan [place] vs transmyelitis   EMG: No electrophysiologic evidence of a motor polyradiculoneuropathy - findings are not consistent with a diagnosis of CIDP  MRI head/cervical/thoracic: Mid cervical degenerative disc disease, C4-C5 broad irregular right central and C5-C6 focal right foraminal disc protrusion (disc herniation) that cause ventral cord deformity. Not consistent with clinical presentation per neurosurgery    Plan:  - LP unable to be performed by procedure team 7/25/23  - Now coordinating with neuroradiology for additional attempt   - Consulted  MRI and anesthesia for MRI under sedation  - MRI under sedation scheduled for 7/26/23

## 2023-07-25 NOTE — PROGRESS NOTE ADULT - SUBJECTIVE AND OBJECTIVE BOX
PROGRESS NOTE:   Authored by Dr. Bharat Venegas MD (PGY-1).     Patient is a 43y old  Female who presents with a chief complaint of Weakness in lower and upper extremities (24 Jul 2023 12:55)      SUBJECTIVE / OVERNIGHT EVENTS:  No acute events overnight. She has no acute complaints this morning.     MEDICATIONS  (STANDING):  amLODIPine   Tablet 5 milliGRAM(s) Oral daily  artificial  tears Solution 1 Drop(s) Both EYES four times a day  ketorolac 10 milliGRAM(s) Oral once  lidocaine   4% Patch 1 Patch Transdermal daily  nystatin/triamcinolone Ointment 1 Application(s) Topical two times a day  polyethylene glycol 3350 17 Gram(s) Oral daily  pregabalin 200 milliGRAM(s) Oral two times a day    MEDICATIONS  (PRN):  diphenhydrAMINE 50 milliGRAM(s) Oral once PRN Anxiety  diphenhydrAMINE Injectable 25 milliGRAM(s) IV Push every 6 hours PRN Rash and/or Itching  LORazepam   Injectable 2 milliGRAM(s) IV Push once PRN Anxiety  morphine  - Injectable 4 milliGRAM(s) IV Push daily PRN Moderate Pain (4 - 6)      CAPILLARY BLOOD GLUCOSE        I&O's Summary      PHYSICAL EXAM:  Vital Signs Last 24 Hrs  T(C): 36.4 (25 Jul 2023 12:05), Max: 36.8 (24 Jul 2023 22:10)  T(F): 97.5 (25 Jul 2023 12:05), Max: 98.2 (24 Jul 2023 22:10)  HR: 98 (25 Jul 2023 12:05) (84 - 98)  BP: 117/69 (25 Jul 2023 12:05) (104/67 - 117/69)  BP(mean): --  RR: 17 (25 Jul 2023 12:05) (17 - 18)  SpO2: 98% (25 Jul 2023 12:05) (96% - 98%)    Parameters below as of 25 Jul 2023 12:05  Patient On (Oxygen Delivery Method): room air        CONSTITUTIONAL: NAD, well-developed  HEET: MMM, EOMI, PERRLA  NECK: supple  RESPIRATORY: Normal respiratory effort; lungs are clear to auscultation bilaterally  CARDIOVASCULAR: Regular rate and rhythm, normal S1 and S2, no murmur/rub/gallop; No lower extremity edema; Peripheral pulses are 2+ bilaterally  ABDOMEN: Nontender to palpation, normoactive bowel sounds, no rebound/guarding; No hepatosplenomegaly  MUSCULOSKELETAL: no clubbing or cyanosis of digits; no joint swelling or tenderness to palpation  NEURO: Moving all four extremities, sensation grossly intact  PSYCH: A+O to person, place, and time; affect appropriate  SKIN: No rash    LABS:                        11.2   3.93  )-----------( 272      ( 25 Jul 2023 07:30 )             34.0     07-25    139  |  103  |  13  ----------------------------<  94  4.0   |  25  |  0.50    Ca    9.7      25 Jul 2023 07:30  Phos  4.4     07-25  Mg     1.80     07-25    TPro  6.5  /  Alb  3.4  /  TBili  0.4  /  DBili  x   /  AST  88<H>  /  ALT  51<H>  /  AlkPhos  57  07-25    PT/INR - ( 25 Jul 2023 07:30 )   PT: 12.9 sec;   INR: 1.11 ratio         PTT - ( 25 Jul 2023 07:30 )  PTT:29.6 sec      Urinalysis Basic - ( 25 Jul 2023 07:30 )    Color: x / Appearance: x / SG: x / pH: x  Gluc: 94 mg/dL / Ketone: x  / Bili: x / Urobili: x   Blood: x / Protein: x / Nitrite: x   Leuk Esterase: x / RBC: x / WBC x   Sq Epi: x / Non Sq Epi: x / Bacteria: x          Tele Reviewed:    RADIOLOGY & ADDITIONAL TESTS:  Results Reviewed:   Imaging Personally Reviewed:  Electrocardiogram Personally Reviewed:     PROGRESS NOTE:   Authored by Dr. Bharat Venegas MD (PGY-1).     Patient is a 43y old  Female who presents with a chief complaint of Weakness in lower and upper extremities (24 Jul 2023 12:55)      SUBJECTIVE / OVERNIGHT EVENTS:  No acute events overnight. She has no acute complaints this morning.     MEDICATIONS  (STANDING):  amLODIPine   Tablet 5 milliGRAM(s) Oral daily  artificial  tears Solution 1 Drop(s) Both EYES four times a day  ketorolac 10 milliGRAM(s) Oral once  lidocaine   4% Patch 1 Patch Transdermal daily  nystatin/triamcinolone Ointment 1 Application(s) Topical two times a day  polyethylene glycol 3350 17 Gram(s) Oral daily  pregabalin 200 milliGRAM(s) Oral two times a day    MEDICATIONS  (PRN):  diphenhydrAMINE 50 milliGRAM(s) Oral once PRN Anxiety  diphenhydrAMINE Injectable 25 milliGRAM(s) IV Push every 6 hours PRN Rash and/or Itching  LORazepam   Injectable 2 milliGRAM(s) IV Push once PRN Anxiety  morphine  - Injectable 4 milliGRAM(s) IV Push daily PRN Moderate Pain (4 - 6)      CAPILLARY BLOOD GLUCOSE        I&O's Summary      PHYSICAL EXAM:  Vital Signs Last 24 Hrs  T(C): 36.4 (25 Jul 2023 12:05), Max: 36.8 (24 Jul 2023 22:10)  T(F): 97.5 (25 Jul 2023 12:05), Max: 98.2 (24 Jul 2023 22:10)  HR: 98 (25 Jul 2023 12:05) (84 - 98)  BP: 117/69 (25 Jul 2023 12:05) (104/67 - 117/69)  BP(mean): --  RR: 17 (25 Jul 2023 12:05) (17 - 18)  SpO2: 98% (25 Jul 2023 12:05) (96% - 98%)    Parameters below as of 25 Jul 2023 12:05  Patient On (Oxygen Delivery Method): room air        CONSTITUTIONAL: NAD, well-developed  HEET: MMM, EOMI, PERRLA  NECK: supple  RESPIRATORY: Normal respiratory effort; lungs are clear to auscultation bilaterally  CARDIOVASCULAR: Regular rate and rhythm, normal S1 and S2, no murmur/rub/gallop; No lower extremity edema; Peripheral pulses are 2+ bilaterally  ABDOMEN: Nontender to palpation, normoactive bowel sounds, no rebound/guarding; No hepatosplenomegaly  MUSCULOSKELETAL: no clubbing or cyanosis of digits; no joint swelling or tenderness to palpation  NEURO:  5/5 strength on upper and lower extremity flexion and extension b/l. Decreasing sensation in lower extremities distally to proximally   PSYCH: A+O to person, place, and time; affect appropriate  SKIN: No rash    LABS:                        11.2   3.93  )-----------( 272      ( 25 Jul 2023 07:30 )             34.0     07-25    139  |  103  |  13  ----------------------------<  94  4.0   |  25  |  0.50    Ca    9.7      25 Jul 2023 07:30  Phos  4.4     07-25  Mg     1.80     07-25    TPro  6.5  /  Alb  3.4  /  TBili  0.4  /  DBili  x   /  AST  88<H>  /  ALT  51<H>  /  AlkPhos  57  07-25    PT/INR - ( 25 Jul 2023 07:30 )   PT: 12.9 sec;   INR: 1.11 ratio         PTT - ( 25 Jul 2023 07:30 )  PTT:29.6 sec      Urinalysis Basic - ( 25 Jul 2023 07:30 )    Color: x / Appearance: x / SG: x / pH: x  Gluc: 94 mg/dL / Ketone: x  / Bili: x / Urobili: x   Blood: x / Protein: x / Nitrite: x   Leuk Esterase: x / RBC: x / WBC x   Sq Epi: x / Non Sq Epi: x / Bacteria: x          Tele Reviewed:    RADIOLOGY & ADDITIONAL TESTS:  Results Reviewed:   Imaging Personally Reviewed:  Electrocardiogram Personally Reviewed:

## 2023-07-25 NOTE — PROGRESS NOTE ADULT - ATTENDING COMMENTS
43F with PMH of sickle cell trait, asthma, peripheral neuropathy who presents s/p fall with ongoing weakness/paresthesia. Neuro on board. Further workup in progress to eval for other neuro culprits.    Pt seen at bedside. No changes to weakness. Also reporting spasticity. Attempt made to perform bedside LP - but pt not able to tolerate positioning, reporting having uncontrolled LE movement. Labs otherwise stable.     #LE weakness    -MRI with anesthesia pending - NPO @ [facility].   -LP not completed due to technically challenging case - will c/s neurorads/IR for further assistance.   -Will f/u neuro. PT follow up.    Rest of plan as above.

## 2023-07-25 NOTE — PROGRESS NOTE ADULT - PROBLEM SELECTOR PLAN 3
- Hypoechoic liver lesions and hepatic steatosis on RUQ US  - Hepatitis panel, HIV (-)  - CT A/P consistent with hepatic steatosis  - AST/ALT (88/51) 7/24/23

## 2023-07-25 NOTE — CHART NOTE - NSCHARTNOTEFT_GEN_A_CORE
IPT Consulted for diagnostic lumbar puncture. Presented at bedside this morning to perform procedure. Attempted landmark identification with ultrasound guidance in lying position; however, body habitus limited views. Re-positioned in seated position, which was more successful in landmark identification; however, pt with persistent, erratic rhythmic movements of RLE > LLE during marking. Unpredictable and pt unable to limit these movements, resulting in significant alteration of positioning even bfore needle insertion. Procedure terminated d/t risk for iatrogenic injury during procedure.    Mitchell Verdin MD PGY-3  Case D/W Dr. Varela IPT Consulted for diagnostic lumbar puncture. Presented at bedside this morning to perform procedure. Attempted landmark identification with ultrasound guidance in lying position; however, body habitus limited views. Re-positioned in seated position, which was more successful in landmark identification; however, pt with persistent, erratic rhythmic movements of RLE > LLE during marking. Unpredictable and pt unable to limit these movements, resulting in significant alteration of positioning even bfore needle insertion. Procedure terminated d/t risk for iatrogenic injury during procedure.    Recommend NeuroIR consultation for possible sedated lumbar puncture    Mitchell Verdin MD PGY-3  Case D/W Dr. Varela

## 2023-07-26 PROCEDURE — 99232 SBSQ HOSP IP/OBS MODERATE 35: CPT | Mod: GC

## 2023-07-26 RX ORDER — IBUPROFEN 200 MG
600 TABLET ORAL EVERY 8 HOURS
Refills: 0 | Status: COMPLETED | OUTPATIENT
Start: 2023-07-26 | End: 2023-07-28

## 2023-07-26 RX ORDER — BACLOFEN 100 %
5 POWDER (GRAM) MISCELLANEOUS EVERY 8 HOURS
Refills: 0 | Status: DISCONTINUED | OUTPATIENT
Start: 2023-07-26 | End: 2023-07-27

## 2023-07-26 RX ADMIN — Medication 600 MILLIGRAM(S): at 12:20

## 2023-07-26 RX ADMIN — AMLODIPINE BESYLATE 5 MILLIGRAM(S): 2.5 TABLET ORAL at 05:51

## 2023-07-26 RX ADMIN — Medication 600 MILLIGRAM(S): at 23:10

## 2023-07-26 RX ADMIN — Medication 1 DROP(S): at 23:10

## 2023-07-26 RX ADMIN — Medication 5 MILLIGRAM(S): at 12:19

## 2023-07-26 RX ADMIN — Medication 5 MILLIGRAM(S): at 23:10

## 2023-07-26 RX ADMIN — Medication 1 APPLICATION(S): at 08:41

## 2023-07-26 RX ADMIN — Medication 600 MILLIGRAM(S): at 13:20

## 2023-07-26 RX ADMIN — Medication 200 MILLIGRAM(S): at 05:50

## 2023-07-26 RX ADMIN — Medication 200 MILLIGRAM(S): at 17:26

## 2023-07-26 NOTE — PROGRESS NOTE ADULT - SUBJECTIVE AND OBJECTIVE BOX
PROGRESS NOTE:   Authored by Dr. Bharat Venegas MD (PGY-1).     Patient is a 43y old  Female who presents with a chief complaint of Weakness in lower and upper extremities (25 Jul 2023 13:59)      SUBJECTIVE / OVERNIGHT EVENTS:  No acute events overnight. She has no acute complaints this morning.     MEDICATIONS  (STANDING):  amLODIPine   Tablet 5 milliGRAM(s) Oral daily  artificial  tears Solution 1 Drop(s) Both EYES four times a day  lidocaine   4% Patch 1 Patch Transdermal daily  nystatin/triamcinolone Ointment 1 Application(s) Topical two times a day  polyethylene glycol 3350 17 Gram(s) Oral daily  pregabalin 200 milliGRAM(s) Oral two times a day    MEDICATIONS  (PRN):  diphenhydrAMINE 50 milliGRAM(s) Oral once PRN Anxiety  diphenhydrAMINE Injectable 25 milliGRAM(s) IV Push every 6 hours PRN Rash and/or Itching  LORazepam   Injectable 2 milliGRAM(s) IV Push once PRN Anxiety  morphine  - Injectable 4 milliGRAM(s) IV Push daily PRN Moderate Pain (4 - 6)      CAPILLARY BLOOD GLUCOSE        I&O's Summary      PHYSICAL EXAM:  Vital Signs Last 24 Hrs  T(C): 37.1 (26 Jul 2023 05:10), Max: 37.1 (26 Jul 2023 05:10)  T(F): 98.7 (26 Jul 2023 05:10), Max: 98.7 (26 Jul 2023 05:10)  HR: 90 (26 Jul 2023 05:10) (90 - 98)  BP: 113/73 (26 Jul 2023 05:10) (112/66 - 125/89)  BP(mean): --  RR: 17 (26 Jul 2023 05:10) (17 - 18)  SpO2: 98% (26 Jul 2023 05:10) (97% - 99%)    Parameters below as of 26 Jul 2023 05:10  Patient On (Oxygen Delivery Method): room air        CONSTITUTIONAL: NAD, well-developed  HEET: MMM, EOMI, PERRLA  NECK: supple  RESPIRATORY: Normal respiratory effort; lungs are clear to auscultation bilaterally  CARDIOVASCULAR: Regular rate and rhythm, normal S1 and S2, no murmur/rub/gallop; No lower extremity edema; Peripheral pulses are 2+ bilaterally  ABDOMEN: Nontender to palpation, normoactive bowel sounds, no rebound/guarding; No hepatosplenomegaly  MUSCULOSKELETAL: no clubbing or cyanosis of digits; no joint swelling or tenderness to palpation  NEURO: 5/5 strength in upper and lower extremities b/l on flexion and extension. Diminished sensation in lower extremities.  PSYCH: A+O to person, place, and time; affect appropriate  SKIN: No rash    LABS:                        11.2   3.93  )-----------( 272      ( 25 Jul 2023 07:30 )             34.0     07-25    139  |  103  |  13  ----------------------------<  94  4.0   |  25  |  0.50    Ca    9.7      25 Jul 2023 07:30  Phos  4.4     07-25  Mg     1.80     07-25    TPro  6.5  /  Alb  3.4  /  TBili  0.4  /  DBili  x   /  AST  88<H>  /  ALT  51<H>  /  AlkPhos  57  07-25    PT/INR - ( 25 Jul 2023 07:30 )   PT: 12.9 sec;   INR: 1.11 ratio         PTT - ( 25 Jul 2023 07:30 )  PTT:29.6 sec      Urinalysis Basic - ( 25 Jul 2023 07:30 )    Color: x / Appearance: x / SG: x / pH: x  Gluc: 94 mg/dL / Ketone: x  / Bili: x / Urobili: x   Blood: x / Protein: x / Nitrite: x   Leuk Esterase: x / RBC: x / WBC x   Sq Epi: x / Non Sq Epi: x / Bacteria: x          Tele Reviewed:    RADIOLOGY & ADDITIONAL TESTS:  Results Reviewed:   Imaging Personally Reviewed:  Electrocardiogram Personally Reviewed:

## 2023-07-26 NOTE — CHART NOTE - NSCHARTNOTEFT_GEN_A_CORE
PRE-INTERVENTIONAL RADIOLOGY PROCEDURE NOTE      Patient Age: 43y    Patient Gender: Female    Procedure: LP under sedation    Diagnosis/Indication: B/l UE and LE weakness     Interventional Radiology Attending Physician: Dr. Hughes    Ordering Attending Physician: Dr. Doug Montenegro    Pertinent Medical History:    Pertinent labs:                      11.2   3.93  )-----------( 272      ( 25 Jul 2023 07:30 )             34.0       07-25    139  |  103  |  13  ----------------------------<  94  4.0   |  25  |  0.50    Ca    9.7      25 Jul 2023 07:30  Phos  4.4     07-25  Mg     1.80     07-25    TPro  6.5  /  Alb  3.4  /  TBili  0.4  /  DBili  x   /  AST  88<H>  /  ALT  51<H>  /  AlkPhos  57  07-25      PT/INR - ( 25 Jul 2023 07:30 )   PT: 12.9 sec;   INR: 1.11 ratio         PTT - ( 25 Jul 2023 07:30 )  PTT:29.6 sec        Patient and Family Aware ? Yes

## 2023-07-26 NOTE — PROGRESS NOTE ADULT - ASSESSMENT
Ms Perez is a 42yo woman with a hx of peripheral neuropathy, sickle cell trait, asthma, and anemia who was admitted after suffering a fall with inability to get up due to 4 months of ongoing bilateral lower and upper extremity weakness and paresthesias. Homocysteine slightly elevated however rest of lab workup unremarkable so far. EMG not consistent with CIDP. MRI head, C/T spine completed 7/21 showing C4-C6 disc protrusions causing ventral cord deformity, not consistent with clinical presentation. Awaiting completion of MRI imaging.

## 2023-07-26 NOTE — PROGRESS NOTE ADULT - ATTENDING COMMENTS
43F with PMH of sickle cell trait, asthma, peripheral neuropathy who presents s/p fall with ongoing weakness/paresthesia. Neuro on board. Further workup in progress to eval for other neuro culprits.    Pt seen at bedside. No changes to weakness. Still w/ spasticity. Exam unchanged. No new labs.    #LE weakness    -MRI with anesthesia pending - NPO currently.  -Neurorads / IR for image guided LP.  -F/u neuro pending additional data.    Rest of plan as above. DC planning to [facility]. 43F with PMH of sickle cell trait, asthma, peripheral neuropathy who presents s/p fall with ongoing weakness/paresthesia. Neuro on board. Further workup in progress to eval for other neuro culprits.    Pt seen at bedside. No changes to weakness. Still w/ spasticity. Exam unchanged. No new labs.    #LE weakness    -MRI with anesthesia pending - NPO currently.  -Neurorads / IR for image guided LP.  -F/u neuro pending additional data.  -Further optimize pain sx - standing ibuprofen and baclofen. Reassess in 48h    Rest of plan as above. DC planning to [facility] ([facility]

## 2023-07-26 NOTE — PROGRESS NOTE ADULT - PROBLEM SELECTOR PLAN 1
Ascending weakness and paresthesias, beginning in 3/2023  Possible CIDP vs Guillan [place] vs transmyelitis   EMG: No electrophysiologic evidence of a motor polyradiculoneuropathy - findings are not consistent with a diagnosis of CIDP  MRI head/cervical/thoracic: Mid cervical degenerative disc disease, C4-C5 broad irregular right central and C5-C6 focal right foraminal disc protrusion (disc herniation) that cause ventral cord deformity. Not consistent with clinical presentation per neurosurgery    Plan:  - LP unable to be performed by procedure team 7/25/23  - Now coordinating with neuroradiology for additional attempt   - Consulted  MRI and anesthesia for MRI under sedation  - MRI under sedation scheduled for 7/26/23 Ascending weakness and paresthesias, beginning in 3/2023  Possible CIDP vs Guillan [place] vs transmyelitis   EMG: No electrophysiologic evidence of a motor polyradiculoneuropathy - findings are not consistent with a diagnosis of CIDP  MRI head/cervical/thoracic: Mid cervical degenerative disc disease, C4-C5 broad irregular right central and C5-C6 focal right foraminal disc protrusion (disc herniation) that cause ventral cord deformity. Not consistent with clinical presentation per neurosurgery    Plan:  - LP unable to be performed by procedure team 7/25/23  - Now coordinating with neuroradiology for additional attempt   - Consulted  MRI and anesthesia for MRI under sedation  - MRI under sedation scheduled for 7/26/23  - Also started on baclofen 5mg and motrin 600mg for additional pain control

## 2023-07-26 NOTE — CHART NOTE - NSCHARTNOTEFT_GEN_A_CORE
Source: Patient [x ]    Family [ ]     other [ x] chart review    Patient seen for length of stay nutrition f/u. 43 year old female with a PMH of peripheral neuropathy, sickle cell trait, asthma, and anemia who was admitted after suffering a fall with inability to get up due to 4 months of ongoing bilateral lower and upper extremity weakness and paresthesias, pending MRI per chart.    Patient reporting good appetite. Food preferences reviewed. No GI distress or chewing/swallowing difficulties. Has no food allergies. No edema or pressure injuries noted per RN flow sheet.    Diet : Diet, NPO after Midnight:      NPO Start Date: 25-Jul-2023,   NPO Start Time: 23:59  Except Medications  With Ice Chips/Sips of Water (07-25-23 @ 18:53)  Diet, Regular (07-25-23 @ 14:10)    Current Weight: no new weight to assess  121 kg (7/19)  110.7 kg (7/12)    Pertinent Medications: MEDICATIONS  (STANDING):  amLODIPine   Tablet 5 milliGRAM(s) Oral daily  artificial  tears Solution 1 Drop(s) Both EYES four times a day  baclofen 5 milliGRAM(s) Oral every 8 hours  ibuprofen  Tablet. 600 milliGRAM(s) Oral every 8 hours  lidocaine   4% Patch 1 Patch Transdermal daily  nystatin/triamcinolone Ointment 1 Application(s) Topical two times a day  polyethylene glycol 3350 17 Gram(s) Oral daily  pregabalin 200 milliGRAM(s) Oral two times a day    MEDICATIONS  (PRN):  diphenhydrAMINE 50 milliGRAM(s) Oral once PRN Anxiety  diphenhydrAMINE Injectable 25 milliGRAM(s) IV Push every 6 hours PRN Rash and/or Itching  LORazepam   Injectable 2 milliGRAM(s) IV Push once PRN Anxiety  morphine  - Injectable 4 milliGRAM(s) IV Push daily PRN Moderate Pain (4 - 6)    Pertinent Labs:  07-25 Na139 mmol/L Glu 94 mg/dL K+ 4.0 mmol/L Cr  0.50 mg/dL BUN 13 mg/dL 07-25 Phos 4.4 mg/dL 07-25 Alb 3.4 g/dL    Estimated Needs: [x ] no change since previous assessment    Previous Nutrition Diagnosis: No active nutrition diagnosis at this time    Nutrition Diagnosis is [ x] ongoing  [ ] resolved [ ] not applicable     Education:    [  ] Given today    Type of education provided:    [  ] Given on previous assessment by RD    [  ] Not applicable 2/2 cognitive deficit    [  ] Pt refusal of education offered    [  ] Not applicable 2/2 current prognosis    [x  ] Not warranted at present    Recommend  - continue diet as ordered  - obtain weekly weight and document PO intake to monitor trend    Monitoring and Evaluation:     [x ] PO intake [x ] Tolerance to diet prescription [x ] weights [x ] follow up per protocol    Renzo Beck, [id number] or TEAMS

## 2023-07-27 LAB
ALBUMIN SERPL ELPH-MCNC: 3.5 G/DL — SIGNIFICANT CHANGE UP (ref 3.3–5)
ALP SERPL-CCNC: 56 U/L — SIGNIFICANT CHANGE UP (ref 40–120)
ALT FLD-CCNC: 48 U/L — HIGH (ref 4–33)
ANION GAP SERPL CALC-SCNC: 12 MMOL/L — SIGNIFICANT CHANGE UP (ref 7–14)
APPEARANCE CSF: CLEAR — SIGNIFICANT CHANGE UP
APPEARANCE SPUN FLD: COLORLESS — SIGNIFICANT CHANGE UP
APTT BLD: 29.5 SEC — SIGNIFICANT CHANGE UP (ref 24.5–35.6)
AST SERPL-CCNC: 81 U/L — HIGH (ref 4–32)
BACTERIAL AG PNL SER: 0 % — SIGNIFICANT CHANGE UP
BILIRUB SERPL-MCNC: 0.4 MG/DL — SIGNIFICANT CHANGE UP (ref 0.2–1.2)
BLD GP AB SCN SERPL QL: NEGATIVE — SIGNIFICANT CHANGE UP
BUN SERPL-MCNC: 13 MG/DL — SIGNIFICANT CHANGE UP (ref 7–23)
CALCIUM SERPL-MCNC: 9.7 MG/DL — SIGNIFICANT CHANGE UP (ref 8.4–10.5)
CHLORIDE SERPL-SCNC: 105 MMOL/L — SIGNIFICANT CHANGE UP (ref 98–107)
CO2 SERPL-SCNC: 24 MMOL/L — SIGNIFICANT CHANGE UP (ref 22–31)
COLOR CSF: COLORLESS — SIGNIFICANT CHANGE UP
CREAT SERPL-MCNC: 0.55 MG/DL — SIGNIFICANT CHANGE UP (ref 0.5–1.3)
EGFR: 117 ML/MIN/1.73M2 — SIGNIFICANT CHANGE UP
EOSINOPHIL # CSF: 0 % — SIGNIFICANT CHANGE UP
GLUCOSE CSF-MCNC: 63 MG/DL — SIGNIFICANT CHANGE UP (ref 40–70)
GLUCOSE SERPL-MCNC: 93 MG/DL — SIGNIFICANT CHANGE UP (ref 70–99)
HCG SERPL-ACNC: <1 MIU/ML — SIGNIFICANT CHANGE UP
HCT VFR BLD CALC: 35 % — SIGNIFICANT CHANGE UP (ref 34.5–45)
HGB BLD-MCNC: 11.3 G/DL — LOW (ref 11.5–15.5)
INR BLD: 1.07 RATIO — SIGNIFICANT CHANGE UP (ref 0.85–1.18)
LDH CSF L TO P-CCNC: 11 U/L — SIGNIFICANT CHANGE UP
LDH FLD-CCNC: 11 U/L — SIGNIFICANT CHANGE UP
LYMPHOCYTES # CSF: 73 % — SIGNIFICANT CHANGE UP
MAGNESIUM SERPL-MCNC: 1.9 MG/DL — SIGNIFICANT CHANGE UP (ref 1.6–2.6)
MCHC RBC-ENTMCNC: 29 PG — SIGNIFICANT CHANGE UP (ref 27–34)
MCHC RBC-ENTMCNC: 32.3 GM/DL — SIGNIFICANT CHANGE UP (ref 32–36)
MCV RBC AUTO: 89.7 FL — SIGNIFICANT CHANGE UP (ref 80–100)
MONOS+MACROS NFR CSF: 27 % — SIGNIFICANT CHANGE UP
NEUTROPHILS # CSF: 0 % — SIGNIFICANT CHANGE UP
NRBC # BLD: 0 /100 WBCS — SIGNIFICANT CHANGE UP (ref 0–0)
NRBC # FLD: 0 K/UL — SIGNIFICANT CHANGE UP (ref 0–0)
NRBC NFR CSF: 0 CELLS/UL — SIGNIFICANT CHANGE UP (ref 0–5)
OTHER CELLS CSF MANUAL: 0 % — SIGNIFICANT CHANGE UP
PHOSPHATE SERPL-MCNC: 4.8 MG/DL — HIGH (ref 2.5–4.5)
PLATELET # BLD AUTO: 288 K/UL — SIGNIFICANT CHANGE UP (ref 150–400)
POTASSIUM SERPL-MCNC: 3.7 MMOL/L — SIGNIFICANT CHANGE UP (ref 3.5–5.3)
POTASSIUM SERPL-SCNC: 3.7 MMOL/L — SIGNIFICANT CHANGE UP (ref 3.5–5.3)
PROT CSF-MCNC: 24 MG/DL — SIGNIFICANT CHANGE UP (ref 15–45)
PROT SERPL-MCNC: 7.3 G/DL — SIGNIFICANT CHANGE UP (ref 6–8.3)
PROTHROM AB SERPL-ACNC: 12 SEC — SIGNIFICANT CHANGE UP (ref 9.5–13)
RBC # BLD: 3.9 M/UL — SIGNIFICANT CHANGE UP (ref 3.8–5.2)
RBC # CSF: 1 CELLS/UL — HIGH (ref 0–0)
RBC # FLD: 14 % — SIGNIFICANT CHANGE UP (ref 10.3–14.5)
RH IG SCN BLD-IMP: POSITIVE — SIGNIFICANT CHANGE UP
SODIUM SERPL-SCNC: 141 MMOL/L — SIGNIFICANT CHANGE UP (ref 135–145)
TOTAL CELLS COUNTED, SPINAL FLUID: 11 CELLS — SIGNIFICANT CHANGE UP
TUBE TYPE: SIGNIFICANT CHANGE UP
WBC # BLD: 4.53 K/UL — SIGNIFICANT CHANGE UP (ref 3.8–10.5)
WBC # FLD AUTO: 4.53 K/UL — SIGNIFICANT CHANGE UP (ref 3.8–10.5)

## 2023-07-27 PROCEDURE — 70553 MRI BRAIN STEM W/O & W/DYE: CPT | Mod: 26

## 2023-07-27 PROCEDURE — 62328 DX LMBR SPI PNXR W/FLUOR/CT: CPT

## 2023-07-27 PROCEDURE — 72157 MRI CHEST SPINE W/O & W/DYE: CPT | Mod: 26

## 2023-07-27 PROCEDURE — 72156 MRI NECK SPINE W/O & W/DYE: CPT | Mod: 26

## 2023-07-27 PROCEDURE — 72158 MRI LUMBAR SPINE W/O & W/DYE: CPT | Mod: 26

## 2023-07-27 PROCEDURE — 99232 SBSQ HOSP IP/OBS MODERATE 35: CPT | Mod: GC

## 2023-07-27 RX ORDER — BACLOFEN 100 %
10 POWDER (GRAM) MISCELLANEOUS EVERY 8 HOURS
Refills: 0 | Status: DISCONTINUED | OUTPATIENT
Start: 2023-07-27 | End: 2023-07-28

## 2023-07-27 RX ORDER — BACLOFEN 100 %
5 POWDER (GRAM) MISCELLANEOUS EVERY 8 HOURS
Refills: 0 | Status: DISCONTINUED | OUTPATIENT
Start: 2023-07-27 | End: 2023-07-27

## 2023-07-27 RX ORDER — POTASSIUM CHLORIDE 20 MEQ
20 PACKET (EA) ORAL ONCE
Refills: 0 | Status: COMPLETED | OUTPATIENT
Start: 2023-07-27 | End: 2023-07-27

## 2023-07-27 RX ADMIN — Medication 1 DROP(S): at 05:47

## 2023-07-27 RX ADMIN — Medication 200 MILLIGRAM(S): at 05:51

## 2023-07-27 RX ADMIN — Medication 600 MILLIGRAM(S): at 14:06

## 2023-07-27 RX ADMIN — Medication 600 MILLIGRAM(S): at 06:46

## 2023-07-27 RX ADMIN — Medication 5 MILLIGRAM(S): at 12:01

## 2023-07-27 RX ADMIN — Medication 600 MILLIGRAM(S): at 05:46

## 2023-07-27 RX ADMIN — Medication 5 MILLIGRAM(S): at 05:46

## 2023-07-27 RX ADMIN — Medication 600 MILLIGRAM(S): at 00:10

## 2023-07-27 RX ADMIN — Medication 600 MILLIGRAM(S): at 15:06

## 2023-07-27 RX ADMIN — Medication 1 APPLICATION(S): at 00:43

## 2023-07-27 RX ADMIN — Medication 1 DROP(S): at 14:04

## 2023-07-27 RX ADMIN — AMLODIPINE BESYLATE 5 MILLIGRAM(S): 2.5 TABLET ORAL at 05:46

## 2023-07-27 RX ADMIN — Medication 1 APPLICATION(S): at 05:46

## 2023-07-27 RX ADMIN — Medication 20 MILLIEQUIVALENT(S): at 14:05

## 2023-07-27 NOTE — PROGRESS NOTE ADULT - PROBLEM SELECTOR PLAN 1
Ascending weakness and paresthesias, beginning in 3/2023  Possible CIDP vs Guillan [place] vs transmyelitis   EMG: No electrophysiologic evidence of a motor polyradiculoneuropathy - findings are not consistent with a diagnosis of CIDP  MRI head/cervical/thoracic: Mid cervical degenerative disc disease, C4-C5 broad irregular right central and C5-C6 focal right foraminal disc protrusion (disc herniation) that cause ventral cord deformity. Not consistent with clinical presentation per neurosurgery    Plan:  - LP unable to be performed by procedure team 7/25/23  - Now coordinating with neuroradiology for additional attempt (scheduled 7/27)  - Consulted  MRI and anesthesia for MRI under sedation  - MRI under sedation completed 7/26/23  - Also started on baclofen 5mg and motrin 600mg for additional pain control Ascending weakness and paresthesias, beginning in 3/2023  Possible CIDP vs Guillan [place] vs transmyelitis   EMG: No electrophysiologic evidence of a motor polyradiculoneuropathy - findings are not consistent with a diagnosis of CIDP  MRI head/cervical/thoracic: Mid cervical degenerative disc disease, C4-C5 broad irregular right central and C5-C6 focal right foraminal disc protrusion (disc herniation) that cause ventral cord deformity. Not consistent with clinical presentation per neurosurgery    Plan:  - LP unable to be performed by procedure team 7/25/23  - LP under sedation performed 7/27; f/u CSF studies  - Consulted  MRI and anesthesia for MRI under sedation  - MRI under sedation completed 7/26/23  - Also started on baclofen 5mg and motrin 600mg for additional pain control Ascending weakness and paresthesias, beginning in 3/2023  Possible CIDP vs Guillan [place] vs transmyelitis   EMG: No electrophysiologic evidence of a motor polyradiculoneuropathy - findings are not consistent with a diagnosis of CIDP  MRI head/cervical/thoracic: Mid cervical degenerative disc disease, C4-C5 broad irregular right central and C5-C6 focal right foraminal disc protrusion (disc herniation) that cause ventral cord deformity. Not consistent with clinical presentation per neurosurgery    Plan:  - LP unable to be performed by procedure team 7/25/23  - LP under sedation performed 7/27; f/u CSF studies  - Consulted  MRI and anesthesia for MRI under sedation  - MRI under sedation completed 7/26/23  - Also started on baclofen 5mg and motrin 600mg for additional pain control  - Neurology following; appreciate recs

## 2023-07-27 NOTE — PROGRESS NOTE ADULT - ATTENDING COMMENTS
43F with PMH of sickle cell trait, asthma, peripheral neuropathy who presents s/p fall with ongoing weakness/paresthesia. Neuro on board. Further workup in progress to eval for other neuro culprits. MRI H/spine showed no acute pathologies. LP done. Prelim cell studies not remarkable. Additional serologies in progress.     Pt seen at bedside. JUst returned from IR. Tearful, tremulous. States she can't control her shaking. On exam, laying flat in bed, awake/alert. Crying. APpears uncomfortable. Rhythmic jerking of LE/body. Labs stable. CSF cell count unremarkable.     #LE weakness, paresthesia    -Further optimize pain sx - standing ibuprofen and baclofen. Cont the same. Consider inc baclofen.  -F/u neuro for further recs.    Rest of plan as above. DC planning.

## 2023-07-27 NOTE — PROGRESS NOTE ADULT - ASSESSMENT
Ms Perez is a 42yo woman with a hx of peripheral neuropathy, sickle cell trait, asthma, and anemia who was admitted after suffering a fall with inability to get up due to 4 months of ongoing bilateral lower and upper extremity weakness and paresthesias. Homocysteine slightly elevated however rest of lab workup unremarkable so far. EMG not consistent with CIDP. MRI head, C/T spine completed 7/21 showing C4-C6 disc protrusions causing ventral cord deformity, not consistent with clinical presentation. Awaiting completion of MRI imaging. Ms Perez is a 44yo woman with a hx of peripheral neuropathy, sickle cell trait, asthma, and anemia who was admitted after suffering a fall with inability to get up due to 4 months of ongoing bilateral lower and upper extremity weakness and paresthesias. Homocysteine slightly elevated however rest of lab workup unremarkable so far. EMG not consistent with CIDP. MRI head, C/T spine completed 7/21 showing C4-C6 disc protrusions causing ventral cord deformity, not consistent with clinical presentation.
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
## 2023-07-27 NOTE — PROGRESS NOTE ADULT - SUBJECTIVE AND OBJECTIVE BOX
PROGRESS NOTE:   Authored by Dr. Bharat Venegas MD (PGY-1).     Patient is a 43y old  Female who presents with a chief complaint of Weakness in lower and upper extremities (26 Jul 2023 08:54)      SUBJECTIVE / OVERNIGHT EVENTS:  No acute events overnight. She has no acute complaints this morning.     MEDICATIONS  (STANDING):  amLODIPine   Tablet 5 milliGRAM(s) Oral daily  artificial  tears Solution 1 Drop(s) Both EYES four times a day  baclofen 5 milliGRAM(s) Oral every 8 hours  ibuprofen  Tablet. 600 milliGRAM(s) Oral every 8 hours  lidocaine   4% Patch 1 Patch Transdermal daily  nystatin/triamcinolone Ointment 1 Application(s) Topical two times a day  polyethylene glycol 3350 17 Gram(s) Oral daily  potassium chloride    Tablet ER 20 milliEquivalent(s) Oral once  pregabalin 200 milliGRAM(s) Oral two times a day    MEDICATIONS  (PRN):  diphenhydrAMINE 50 milliGRAM(s) Oral once PRN Anxiety  diphenhydrAMINE Injectable 25 milliGRAM(s) IV Push every 6 hours PRN Rash and/or Itching  LORazepam   Injectable 2 milliGRAM(s) IV Push once PRN Anxiety  morphine  - Injectable 4 milliGRAM(s) IV Push daily PRN Moderate Pain (4 - 6)      CAPILLARY BLOOD GLUCOSE        I&O's Summary      PHYSICAL EXAM:  Vital Signs Last 24 Hrs  T(C): 36.7 (27 Jul 2023 05:31), Max: 37 (26 Jul 2023 12:56)  T(F): 98.1 (27 Jul 2023 05:31), Max: 98.6 (26 Jul 2023 12:56)  HR: 60 (27 Jul 2023 05:31) (60 - 90)  BP: 106/71 (27 Jul 2023 05:31) (106/71 - 131/85)  BP(mean): --  RR: 17 (27 Jul 2023 05:31) (17 - 18)  SpO2: 98% (27 Jul 2023 05:31) (97% - 98%)    Parameters below as of 27 Jul 2023 05:31  Patient On (Oxygen Delivery Method): room air        CONSTITUTIONAL: NAD, well-developed  HEET: MMM, EOMI, PERRLA  NECK: supple  RESPIRATORY: Normal respiratory effort; lungs are clear to auscultation bilaterally  CARDIOVASCULAR: Regular rate and rhythm, normal S1 and S2, no murmur/rub/gallop; No lower extremity edema; Peripheral pulses are 2+ bilaterally  ABDOMEN: Nontender to palpation, normoactive bowel sounds, no rebound/guarding; No hepatosplenomegaly  MUSCULOSKELETAL: no clubbing or cyanosis of digits; no joint swelling or tenderness to palpation  NEURO: Moving all four extremities, sensation grossly intact  PSYCH: A+O to person, place, and time; affect appropriate  SKIN: No rash    LABS:                        11.3   4.53  )-----------( 288      ( 27 Jul 2023 07:10 )             35.0     07-27    141  |  105  |  13  ----------------------------<  93  3.7   |  24  |  0.55    Ca    9.7      27 Jul 2023 07:10  Phos  4.8     07-27  Mg     1.90     07-27    TPro  7.3  /  Alb  3.5  /  TBili  0.4  /  DBili  x   /  AST  81<H>  /  ALT  48<H>  /  AlkPhos  56  07-27    PT/INR - ( 27 Jul 2023 07:10 )   PT: 12.0 sec;   INR: 1.07 ratio         PTT - ( 27 Jul 2023 07:10 )  PTT:29.5 sec      Urinalysis Basic - ( 27 Jul 2023 07:10 )    Color: x / Appearance: x / SG: x / pH: x  Gluc: 93 mg/dL / Ketone: x  / Bili: x / Urobili: x   Blood: x / Protein: x / Nitrite: x   Leuk Esterase: x / RBC: x / WBC x   Sq Epi: x / Non Sq Epi: x / Bacteria: x          Tele Reviewed:    RADIOLOGY & ADDITIONAL TESTS:  Results Reviewed:   Imaging Personally Reviewed:  Electrocardiogram Personally Reviewed:     PROGRESS NOTE:   Authored by Dr. Bharat Venegas MD (PGY-1).     Patient is a 43y old  Female who presents with a chief complaint of Weakness in lower and upper extremities (26 Jul 2023 08:54)      SUBJECTIVE / OVERNIGHT EVENTS:  No acute events overnight. She has no acute complaints this morning.     MEDICATIONS  (STANDING):  amLODIPine   Tablet 5 milliGRAM(s) Oral daily  artificial  tears Solution 1 Drop(s) Both EYES four times a day  baclofen 5 milliGRAM(s) Oral every 8 hours  ibuprofen  Tablet. 600 milliGRAM(s) Oral every 8 hours  lidocaine   4% Patch 1 Patch Transdermal daily  nystatin/triamcinolone Ointment 1 Application(s) Topical two times a day  polyethylene glycol 3350 17 Gram(s) Oral daily  potassium chloride    Tablet ER 20 milliEquivalent(s) Oral once  pregabalin 200 milliGRAM(s) Oral two times a day    MEDICATIONS  (PRN):  diphenhydrAMINE 50 milliGRAM(s) Oral once PRN Anxiety  diphenhydrAMINE Injectable 25 milliGRAM(s) IV Push every 6 hours PRN Rash and/or Itching  LORazepam   Injectable 2 milliGRAM(s) IV Push once PRN Anxiety  morphine  - Injectable 4 milliGRAM(s) IV Push daily PRN Moderate Pain (4 - 6)      CAPILLARY BLOOD GLUCOSE        I&O's Summary      PHYSICAL EXAM:  Vital Signs Last 24 Hrs  T(C): 36.7 (27 Jul 2023 05:31), Max: 37 (26 Jul 2023 12:56)  T(F): 98.1 (27 Jul 2023 05:31), Max: 98.6 (26 Jul 2023 12:56)  HR: 60 (27 Jul 2023 05:31) (60 - 90)  BP: 106/71 (27 Jul 2023 05:31) (106/71 - 131/85)  BP(mean): --  RR: 17 (27 Jul 2023 05:31) (17 - 18)  SpO2: 98% (27 Jul 2023 05:31) (97% - 98%)    Parameters below as of 27 Jul 2023 05:31  Patient On (Oxygen Delivery Method): room air        CONSTITUTIONAL: NAD, well-developed  HEET: MMM, EOMI, PERRLA  NECK: supple  RESPIRATORY: Normal respiratory effort; lungs are clear to auscultation bilaterally  CARDIOVASCULAR: Regular rate and rhythm, normal S1 and S2, no murmur/rub/gallop; No lower extremity edema; Peripheral pulses are 2+ bilaterally  ABDOMEN: Nontender to palpation, normoactive bowel sounds, no rebound/guarding; No hepatosplenomegaly  MUSCULOSKELETAL: no clubbing or cyanosis of digits; no joint swelling or tenderness to palpation  NEURO: 5/5 strength in upper and lower extremities b/l on flexion and extension. Diminished sensation in lower extremities   PSYCH: A+O to person, place, and time; affect appropriate  SKIN: No rash    LABS:                        11.3   4.53  )-----------( 288      ( 27 Jul 2023 07:10 )             35.0     07-27    141  |  105  |  13  ----------------------------<  93  3.7   |  24  |  0.55    Ca    9.7      27 Jul 2023 07:10  Phos  4.8     07-27  Mg     1.90     07-27    TPro  7.3  /  Alb  3.5  /  TBili  0.4  /  DBili  x   /  AST  81<H>  /  ALT  48<H>  /  AlkPhos  56  07-27    PT/INR - ( 27 Jul 2023 07:10 )   PT: 12.0 sec;   INR: 1.07 ratio         PTT - ( 27 Jul 2023 07:10 )  PTT:29.5 sec      Urinalysis Basic - ( 27 Jul 2023 07:10 )    Color: x / Appearance: x / SG: x / pH: x  Gluc: 93 mg/dL / Ketone: x  / Bili: x / Urobili: x   Blood: x / Protein: x / Nitrite: x   Leuk Esterase: x / RBC: x / WBC x   Sq Epi: x / Non Sq Epi: x / Bacteria: x          Tele Reviewed:    RADIOLOGY & ADDITIONAL TESTS:  Results Reviewed:   Imaging Personally Reviewed:  Electrocardiogram Personally Reviewed:

## 2023-07-27 NOTE — PROGRESS NOTE ADULT - PROBLEM SELECTOR PLAN 3
- Hypoechoic liver lesions and hepatic steatosis on RUQ US  - Hepatitis panel, HIV (-)  - CT A/P consistent with hepatic steatosis  - AST/ALT (81/48) 7/27/23

## 2023-07-28 LAB
ALBUMIN CSF-MCNC: 13 MG/DL — LOW (ref 14–25)
ALBUMIN SERPL ELPH-MCNC: 3114 MG/DL — LOW (ref 3500–5200)
IGG CSF-MCNC: 3.2 MG/DL — SIGNIFICANT CHANGE UP
IGG FLD-MCNC: 1312 MG/DL — SIGNIFICANT CHANGE UP (ref 610–1660)
IGG SYNTH RATE SER+CSF CALC-MRATE: -1.3 MG/DAY — SIGNIFICANT CHANGE UP
IGG/ALB CLEAR SER+CSF-RTO: 0.6 — SIGNIFICANT CHANGE UP
IGG/ALB CSF: 0.25 RATIO — SIGNIFICANT CHANGE UP
IGG/ALB SER: 0.42 RATIO — SIGNIFICANT CHANGE UP

## 2023-07-28 PROCEDURE — 95720 EEG PHY/QHP EA INCR W/VEEG: CPT

## 2023-07-28 PROCEDURE — 93010 ELECTROCARDIOGRAM REPORT: CPT

## 2023-07-28 PROCEDURE — 99233 SBSQ HOSP IP/OBS HIGH 50: CPT

## 2023-07-28 PROCEDURE — 99233 SBSQ HOSP IP/OBS HIGH 50: CPT | Mod: GC

## 2023-07-28 RX ORDER — DULOXETINE HYDROCHLORIDE 30 MG/1
30 CAPSULE, DELAYED RELEASE ORAL AT BEDTIME
Refills: 0 | Status: DISCONTINUED | OUTPATIENT
Start: 2023-07-28 | End: 2023-08-02

## 2023-07-28 RX ORDER — BACLOFEN 100 %
10 POWDER (GRAM) MISCELLANEOUS EVERY 8 HOURS
Refills: 0 | Status: DISCONTINUED | OUTPATIENT
Start: 2023-07-28 | End: 2023-07-29

## 2023-07-28 RX ORDER — HYDROMORPHONE HYDROCHLORIDE 2 MG/ML
2 INJECTION INTRAMUSCULAR; INTRAVENOUS; SUBCUTANEOUS
Refills: 0 | Status: DISCONTINUED | OUTPATIENT
Start: 2023-07-28 | End: 2023-07-28

## 2023-07-28 RX ADMIN — MORPHINE SULFATE 4 MILLIGRAM(S): 50 CAPSULE, EXTENDED RELEASE ORAL at 23:24

## 2023-07-28 RX ADMIN — Medication 10 MILLIGRAM(S): at 22:14

## 2023-07-28 RX ADMIN — Medication 10 MILLIGRAM(S): at 02:29

## 2023-07-28 RX ADMIN — DULOXETINE HYDROCHLORIDE 30 MILLIGRAM(S): 30 CAPSULE, DELAYED RELEASE ORAL at 22:14

## 2023-07-28 RX ADMIN — AMLODIPINE BESYLATE 5 MILLIGRAM(S): 2.5 TABLET ORAL at 05:44

## 2023-07-28 RX ADMIN — Medication 600 MILLIGRAM(S): at 05:44

## 2023-07-28 RX ADMIN — MORPHINE SULFATE 4 MILLIGRAM(S): 50 CAPSULE, EXTENDED RELEASE ORAL at 23:54

## 2023-07-28 RX ADMIN — Medication 10 MILLIGRAM(S): at 17:39

## 2023-07-28 NOTE — PROGRESS NOTE ADULT - SUBJECTIVE AND OBJECTIVE BOX
Neurology  Progress Note  07-28-23    Name: KAILASH FUENTES 43y    Review of Systems: ***    Medications:  MEDICATIONS  (STANDING):  amLODIPine   Tablet 5 milliGRAM(s) Oral daily  artificial  tears Solution 1 Drop(s) Both EYES four times a day  baclofen 10 milliGRAM(s) Oral every 8 hours  lidocaine   4% Patch 1 Patch Transdermal daily  nystatin/triamcinolone Ointment 1 Application(s) Topical two times a day  polyethylene glycol 3350 17 Gram(s) Oral daily  pregabalin 200 milliGRAM(s) Oral two times a day    MEDICATIONS  (PRN):  diphenhydrAMINE 50 milliGRAM(s) Oral once PRN Anxiety  diphenhydrAMINE Injectable 25 milliGRAM(s) IV Push every 6 hours PRN Rash and/or Itching  LORazepam   Injectable 2 milliGRAM(s) IV Push once PRN Anxiety  morphine  - Injectable 4 milliGRAM(s) IV Push daily PRN Moderate Pain (4 - 6)      Vitals:  T(C): 36.6 (07-28-23 @ 13:40), Max: 37.2 (07-27-23 @ 21:31)  HR: 81 (07-28-23 @ 13:40) (81 - 97)  BP: 107/83 (07-28-23 @ 13:40) (100/60 - 142/92)  RR: 18 (07-28-23 @ 13:40) (18 - 19)  SpO2: 98% (07-28-23 @ 13:40) (95% - 100%)      Neurologic Examination: INCOMPLETE  ***    Labs:                        11.3   4.53  )-----------( 288      ( 27 Jul 2023 07:10 )             35.0     07-27    141  |  105  |  13  ----------------------------<  93  3.7   |  24  |  0.55    Ca    9.7      27 Jul 2023 07:10  Phos  4.8     07-27  Mg     1.90     07-27    TPro  x   /  Alb  3114<L>  /  TBili  x   /  DBili  x   /  AST  x   /  ALT  x   /  AlkPhos  x   07-27    CAPILLARY BLOOD GLUCOSE        LIVER FUNCTIONS - ( 27 Jul 2023 09:55 )  Alb: 3114 mg/dL / Pro: x     / ALK PHOS: x     / ALT: x     / AST: x     / GGT: x             PT/INR - ( 27 Jul 2023 07:10 )   PT: 12.0 sec;   INR: 1.07 ratio    PTT - ( 27 Jul 2023 07:10 )  PTT:29.5 sec    CSF:    Total Nucleated Cell Count, CSF: 0 cells/uL (07-27-23 @ 09:55)  RBC Count - Spinal Fluid: 1 cells/uL (07-27-23 @ 09:55)  Protein, CSF: 24 mg/dL (07-27-23 @ 09:55)    12 Lead ECG (07.12.23 @ 03:46)  Ventricular Rate 105 BPM    Atrial Rate 105 BPM    P-R Interval 162 ms    QRS Duration 82 ms    Q-T Interval 302 ms    QTC Calculation(Bazett) 399 ms    P Axis 64 degrees    R Axis -17 degrees    T Axis -16 degrees    Diagnosis Line Sinus tachycardia  Minimal voltage criteria for LVH, may be normal variant  Possible Anterolateral infarct , age undetermined  Abnormal ECG        Radiology:  MR Head w/wo IV Cont:  (27 Jul 2023 08:04)     Neurology  Progress Note  07-28-23    Name: KAILASH FUENTES 43y    Review of Systems:   - Denies any gastrointestinal or genitourinary complaints  - Reports mid-to-left chest discomfort or dull pain, essentially continuous during her hospital course  - Reports recurrent episodes of full body jerks with retained awareness, onset s/p lumbar puncture  - Denies any symptomatic improvement on pregabalin 200 mg BID      Medications:  MEDICATIONS  (STANDING):  amLODIPine   Tablet 5 milliGRAM(s) Oral daily  artificial  tears Solution 1 Drop(s) Both EYES four times a day  baclofen 10 milliGRAM(s) Oral every 8 hours  lidocaine   4% Patch 1 Patch Transdermal daily  nystatin/triamcinolone Ointment 1 Application(s) Topical two times a day  polyethylene glycol 3350 17 Gram(s) Oral daily  pregabalin 200 milliGRAM(s) Oral two times a day    MEDICATIONS  (PRN):  diphenhydrAMINE 50 milliGRAM(s) Oral once PRN Anxiety  diphenhydrAMINE Injectable 25 milliGRAM(s) IV Push every 6 hours PRN Rash and/or Itching  LORazepam   Injectable 2 milliGRAM(s) IV Push once PRN Anxiety  morphine  - Injectable 4 milliGRAM(s) IV Push daily PRN Moderate Pain (4 - 6)      Vitals:  T(C): 36.6 (07-28-23 @ 13:40), Max: 37.2 (07-27-23 @ 21:31)  HR: 81 (07-28-23 @ 13:40) (81 - 97)  BP: 107/83 (07-28-23 @ 13:40) (100/60 - 142/92)  RR: 18 (07-28-23 @ 13:40) (18 - 19)  SpO2: 98% (07-28-23 @ 13:40) (95% - 100%)      Neurologic Examination:      - Mental Status: Eyes open, attends to examiner; oriented to person, age, month, year, location, and situation; speech is fluent with intact naming, intact repetition, and follows 1-step and 3-step mid-line crossing commands.    - Cranial Nerves:  II: Visual fields are full to confrontation; pupils are equal, round, and reactive to light   III, IV, VI: Extraocular movements are intact without nystagmus  V: Facial sensation is intact in the V1-V3 distribution bilaterally  VII: Face is symmetric with normal eye closure and smile  VIII: Hearing is intact to conversation and finger rub  IX, X: Uvula is midline and soft palate rises symmetrically  XI: Shoulder shrug intact  XII: Tongue protrudes in the midline    - Motor/Strength Testing:  - B/l UE: no drifts  - B/l LE: unable to elevate b/l LE more than 30 degrees above the plane of the bed                                   Right           Left  Deltoid                     5                 5  Biceps                      5                 4+ to 5 (variable effort, unclear motor exam)  Triceps                     5                 4+ to 5 (variable effort, unclear motor exam)  Hand                  5                 4+    Hip Flex                   5*                 5* (limited examination as patient using foot against the bed for leverage during HF testing)  Dorsiflex                  4+                4+ (pain limited)  Plantarflex               5                   5 (w/ best offort)    - Normal muscle bulk and tone throughout.    - Reflexes:   Bicep (C5/C6):                  R 0+ --- L 0+   Brachioradialis (C5/C6) :   R 0+ --- L 1+   Patella (L3/L4) :                 R 0+ --- L 0+   Ankle (S1) :                       R x+ --- L x+ (deferred d/t electric shock like sensation)         Labs:                        11.3   4.53  )-----------( 288      ( 27 Jul 2023 07:10 )             35.0     07-27    141  |  105  |  13  ----------------------------<  93  3.7   |  24  |  0.55    Ca    9.7      27 Jul 2023 07:10  Phos  4.8     07-27  Mg     1.90     07-27    TPro  x   /  Alb  3114<L>  /  TBili  x   /  DBili  x   /  AST  x   /  ALT  x   /  AlkPhos  x   07-27    CAPILLARY BLOOD GLUCOSE        LIVER FUNCTIONS - ( 27 Jul 2023 09:55 )  Alb: 3114 mg/dL / Pro: x     / ALK PHOS: x     / ALT: x     / AST: x     / GGT: x             PT/INR - ( 27 Jul 2023 07:10 )   PT: 12.0 sec;   INR: 1.07 ratio    PTT - ( 27 Jul 2023 07:10 )  PTT:29.5 sec    CSF:    Total Nucleated Cell Count, CSF: 0 cells/uL (07-27-23 @ 09:55)  RBC Count - Spinal Fluid: 1 cells/uL (07-27-23 @ 09:55)  Protein, CSF: 24 mg/dL (07-27-23 @ 09:55)    12 Lead ECG (07.12.23 @ 03:46)  Ventricular Rate 105 BPM    Atrial Rate 105 BPM    P-R Interval 162 ms    QRS Duration 82 ms    Q-T Interval 302 ms    QTC Calculation(Bazett) 399 ms    P Axis 64 degrees    R Axis -17 degrees    T Axis -16 degrees    Diagnosis Line Sinus tachycardia  Minimal voltage criteria for LVH, may be normal variant  Possible Anterolateral infarct , age undetermined  Abnormal ECG        Radiology:     MRI brain/C/T/L w/wo 7/27/23:    CNS STRUCTURES:  The distal cord maintains intact morphology and signal intensity. The   conus is normally positioned at L1. Nerve roots of the cauda equina   appear intact. No pathologic enhancement occurs within the thecal sac.   No nerve root nodularity, clumping or dural adherence is recognized.    IMPRESSION:    1. BRAIN: No abnormal brain enhancement. Cerebral hemispheric frontal   subcortical white matter lesions are nonspecific. The pattern suggests   migraine disease. Subcortical lesions of ischemic white matter disease   could have a similar appearance. The differential diagnosis for these   lesions remains broad. This differential diagnosis would also include   other inflammatory, infectious, demyelinating, metabolic and conceivably   other etiologies. Please note, however, that the extent of this disease   is mild.    2. CERVICAL SPINE: No abnormal cervical enhancement. Reversal of the   cervical lordosis and moderate mid cervical degenerative disc disease are   findings unchanged from 7/20/2023, see prior report    3. THORACIC SPINE: No abnormal thoracic enhancement. Thoracic cord   appears intact. T2-T3 shallow right foraminal disc protrusion (disc   herniation) is associated with slight grade 1 anterior listhesis T2 on T3    4. LUMBAR SPINE: No abnormal lumbar enhancement. Intact conus and   cauda equina. No significant lumbar disc pathology. Transitional   morphology thoracic lumbar junction    5. There is a generalized pattern of patchy signal heterogeneity on the   T1-weighted images that is nonspecific. This may reflect chronic anemia   or other systemic process although is without focal strongly suspicious   lesion   Neurology  Progress Note  07-28-23    Name: KAILASH FUENTES 43y    Review of Systems:   - Denies any gastrointestinal or genitourinary complaints  - Reports mid-to-left chest discomfort or dull pain, essentially continuous during her hospital course  - Reports recurrent episodes of full body jerks with retained awareness, onset s/p lumbar puncture  - Denies any symptomatic improvement on pregabalin 200 mg BID      Medications:  MEDICATIONS  (STANDING):  amLODIPine   Tablet 5 milliGRAM(s) Oral daily  artificial  tears Solution 1 Drop(s) Both EYES four times a day  baclofen 10 milliGRAM(s) Oral every 8 hours  lidocaine   4% Patch 1 Patch Transdermal daily  nystatin/triamcinolone Ointment 1 Application(s) Topical two times a day  polyethylene glycol 3350 17 Gram(s) Oral daily  pregabalin 200 milliGRAM(s) Oral two times a day    MEDICATIONS  (PRN):  diphenhydrAMINE 50 milliGRAM(s) Oral once PRN Anxiety  diphenhydrAMINE Injectable 25 milliGRAM(s) IV Push every 6 hours PRN Rash and/or Itching  LORazepam   Injectable 2 milliGRAM(s) IV Push once PRN Anxiety  morphine  - Injectable 4 milliGRAM(s) IV Push daily PRN Moderate Pain (4 - 6)      Vitals:  T(C): 36.6 (07-28-23 @ 13:40), Max: 37.2 (07-27-23 @ 21:31)  HR: 81 (07-28-23 @ 13:40) (81 - 97)  BP: 107/83 (07-28-23 @ 13:40) (100/60 - 142/92)  RR: 18 (07-28-23 @ 13:40) (18 - 19)  SpO2: 98% (07-28-23 @ 13:40) (95% - 100%)      Neurologic Examination:      - Mental Status: Eyes open, attends to examiner; oriented to person, age, month, year, location, and situation; speech is fluent with intact naming, intact repetition, and follows 1-step and 3-step mid-line crossing commands.    - Cranial Nerves:  II: Visual fields are full to confrontation; pupils are equal, round, and reactive to light   III, IV, VI: Extraocular movements are intact without nystagmus  V: Facial sensation is intact in the V1-V3 distribution bilaterally  VII: Face is symmetric with normal eye closure and smile  VIII: Hearing is intact to conversation and finger rub  IX, X: Uvula is midline and soft palate rises symmetrically  XI: Shoulder shrug intact  XII: Tongue protrudes in the midline    - Motor/Strength Testing:  - B/l UE: no drifts  - B/l LE: unable to elevate b/l LE more than 30 degrees above the plane of the bed                                   Right           Left  Deltoid                     5                 5  Biceps                      5                 4+ to 5 (variable effort, unclear motor exam)  Triceps                     5                 4+ to 5 (variable effort, unclear motor exam)  Hand                  5                 4+    Hip Flex                   5*                 5* (limited examination as patient using foot against the bed for leverage during HF testing)  Dorsiflex                  4+                4+ (pain limited)  Plantarflex               5                   5 (w/ best effort)    - Normal muscle bulk and tone throughout.    - Reflexes:   Bicep (C5/C6):                  R 0+ --- L 0+   Brachioradialis (C5/C6) :   R 0+ --- L 1+   Patella (L3/L4) :                 R 0+ --- L 0+   Ankle (S1) :                       R x+ --- L x+ (deferred d/t electric shock like sensation)     - Sensory:   - Intact sensation to light touch in RUE. Diminished to light touch LUE ("50%"), RLE ("75%"), and LLE ("lowest sensation of all limbs")  - Loss of proprioception in bilateral distal PIP, intact at bilateral ankles.  - Vibration decreased up to the knee, and decreased in left finger.    - Coordination: Finger-nose-finger intact without ataxia or dysmetria.    Labs:                        11.3   4.53  )-----------( 288      ( 27 Jul 2023 07:10 )             35.0     07-27    141  |  105  |  13  ----------------------------<  93  3.7   |  24  |  0.55    Ca    9.7      27 Jul 2023 07:10  Phos  4.8     07-27  Mg     1.90     07-27    TPro  x   /  Alb  3114<L>  /  TBili  x   /  DBili  x   /  AST  x   /  ALT  x   /  AlkPhos  x   07-27    CAPILLARY BLOOD GLUCOSE        LIVER FUNCTIONS - ( 27 Jul 2023 09:55 )  Alb: 3114 mg/dL / Pro: x     / ALK PHOS: x     / ALT: x     / AST: x     / GGT: x             PT/INR - ( 27 Jul 2023 07:10 )   PT: 12.0 sec;   INR: 1.07 ratio    PTT - ( 27 Jul 2023 07:10 )  PTT:29.5 sec    CSF:    Total Nucleated Cell Count, CSF: 0 cells/uL (07-27-23 @ 09:55)  RBC Count - Spinal Fluid: 1 cells/uL (07-27-23 @ 09:55)  Protein, CSF: 24 mg/dL (07-27-23 @ 09:55)    12 Lead ECG (07.12.23 @ 03:46)  Ventricular Rate 105 BPM    Atrial Rate 105 BPM    P-R Interval 162 ms    QRS Duration 82 ms    Q-T Interval 302 ms    QTC Calculation(Bazett) 399 ms    P Axis 64 degrees    R Axis -17 degrees    T Axis -16 degrees    Diagnosis Line Sinus tachycardia  Minimal voltage criteria for LVH, may be normal variant  Possible Anterolateral infarct , age undetermined  Abnormal ECG        Radiology:     MRI brain/C/T/L w/wo 7/27/23:    CNS STRUCTURES:  The distal cord maintains intact morphology and signal intensity. The   conus is normally positioned at L1. Nerve roots of the cauda equina   appear intact. No pathologic enhancement occurs within the thecal sac.   No nerve root nodularity, clumping or dural adherence is recognized.    IMPRESSION:    1. BRAIN: No abnormal brain enhancement. Cerebral hemispheric frontal   subcortical white matter lesions are nonspecific. The pattern suggests   migraine disease. Subcortical lesions of ischemic white matter disease   could have a similar appearance. The differential diagnosis for these   lesions remains broad. This differential diagnosis would also include   other inflammatory, infectious, demyelinating, metabolic and conceivably   other etiologies. Please note, however, that the extent of this disease   is mild.    2. CERVICAL SPINE: No abnormal cervical enhancement. Reversal of the   cervical lordosis and moderate mid cervical degenerative disc disease are   findings unchanged from 7/20/2023, see prior report    3. THORACIC SPINE: No abnormal thoracic enhancement. Thoracic cord   appears intact. T2-T3 shallow right foraminal disc protrusion (disc   herniation) is associated with slight grade 1 anterior listhesis T2 on T3    4. LUMBAR SPINE: No abnormal lumbar enhancement. Intact conus and   cauda equina. No significant lumbar disc pathology. Transitional   morphology thoracic lumbar junction    5. There is a generalized pattern of patchy signal heterogeneity on the   T1-weighted images that is nonspecific. This may reflect chronic anemia   or other systemic process although is without focal strongly suspicious   lesion   Neurology  Progress Note  07-28-23    Name: KAILASH FUENTES 43y    Review of Systems:   - Denies any gastrointestinal or genitourinary complaints  - Reports mid-to-left chest discomfort or dull pain, essentially continuous during her hospital course  - Reports recurrent episodes of full body jerks with retained awareness, onset s/p lumbar puncture  - Denies any symptomatic improvement in neuropathic type pain on pregabalin 200 mg BID.  In the past she tried gabapentin with no improvement.       Medications:  MEDICATIONS  (STANDING):  amLODIPine   Tablet 5 milliGRAM(s) Oral daily  artificial  tears Solution 1 Drop(s) Both EYES four times a day  baclofen 10 milliGRAM(s) Oral every 8 hours  lidocaine   4% Patch 1 Patch Transdermal daily  nystatin/triamcinolone Ointment 1 Application(s) Topical two times a day  polyethylene glycol 3350 17 Gram(s) Oral daily  pregabalin 200 milliGRAM(s) Oral two times a day    MEDICATIONS  (PRN):  diphenhydrAMINE 50 milliGRAM(s) Oral once PRN Anxiety  diphenhydrAMINE Injectable 25 milliGRAM(s) IV Push every 6 hours PRN Rash and/or Itching  LORazepam   Injectable 2 milliGRAM(s) IV Push once PRN Anxiety  morphine  - Injectable 4 milliGRAM(s) IV Push daily PRN Moderate Pain (4 - 6)      Vitals:  T(C): 36.6 (07-28-23 @ 13:40), Max: 37.2 (07-27-23 @ 21:31)  HR: 81 (07-28-23 @ 13:40) (81 - 97)  BP: 107/83 (07-28-23 @ 13:40) (100/60 - 142/92)  RR: 18 (07-28-23 @ 13:40) (18 - 19)  SpO2: 98% (07-28-23 @ 13:40) (95% - 100%)      Neurologic Examination:      - Mental Status: Eyes open, attends to examiner; oriented to person, age, month, year, location, and situation; speech is fluent with intact naming, intact repetition, and follows 1-step and 3-step mid-line crossing commands.    - Cranial Nerves:  II: Visual fields are full to confrontation; pupils are equal, round, and reactive to light   III, IV, VI: Extraocular movements are intact without nystagmus  V: Facial sensation is intact in the V1-V3 distribution bilaterally  VII: Face is symmetric with normal eye closure and smile  VIII: Hearing is intact to conversation and finger rub  IX, X: Uvula is midline and soft palate rises symmetrically  XI: Shoulder shrug intact  XII: Tongue protrudes in the midline    - Motor/Strength Testing:  - B/l UE: no drifts  - B/l LE: unable to elevate b/l LE more than 30 degrees above the plane of the bed                                   Right           Left  Deltoid                     5                 5  Biceps                      5                 4+ to 5 (variable effort, unclear motor exam)  Triceps                     5                 4+ to 5 (variable effort, unclear motor exam)  Hand                  5                 4+    Hip Flex                   5*                 5* (limited examination as patient using foot against the bed for leverage during HF testing)  Dorsiflex                  4+                4+ (pain limited)  Plantarflex               5                   5 (w/ best effort)    - Normal muscle bulk and tone throughout.    - Reflexes:   Bicep (C5/C6):                  R 0+ --- L 0+   Brachioradialis (C5/C6) :   R 0+ --- L 1+   Patella (L3/L4) :                 R 0+ --- L 0+   Ankle (S1) :                       R x+ --- L x+ (deferred d/t electric shock like sensation)     - Sensory:   - Intact sensation to light touch in RUE. Diminished to light touch LUE ("50%"), RLE ("75%"), and LLE ("lowest sensation of all limbs")  - Loss of proprioception in bilateral distal PIP, intact at bilateral ankles.  - Vibration decreased up to the knee, and decreased in left finger.    - Coordination: Finger-nose-finger intact without ataxia or dysmetria.    Labs:                        11.3   4.53  )-----------( 288      ( 27 Jul 2023 07:10 )             35.0     07-27    141  |  105  |  13  ----------------------------<  93  3.7   |  24  |  0.55    Ca    9.7      27 Jul 2023 07:10  Phos  4.8     07-27  Mg     1.90     07-27    TPro  x   /  Alb  3114<L>  /  TBili  x   /  DBili  x   /  AST  x   /  ALT  x   /  AlkPhos  x   07-27    CAPILLARY BLOOD GLUCOSE        LIVER FUNCTIONS - ( 27 Jul 2023 09:55 )  Alb: 3114 mg/dL / Pro: x     / ALK PHOS: x     / ALT: x     / AST: x     / GGT: x             PT/INR - ( 27 Jul 2023 07:10 )   PT: 12.0 sec;   INR: 1.07 ratio    PTT - ( 27 Jul 2023 07:10 )  PTT:29.5 sec    CSF:    Total Nucleated Cell Count, CSF: 0 cells/uL (07-27-23 @ 09:55)  RBC Count - Spinal Fluid: 1 cells/uL (07-27-23 @ 09:55)  Protein, CSF: 24 mg/dL (07-27-23 @ 09:55)    12 Lead ECG (07.12.23 @ 03:46)  Ventricular Rate 105 BPM    Atrial Rate 105 BPM    P-R Interval 162 ms    QRS Duration 82 ms    Q-T Interval 302 ms    QTC Calculation(Bazett) 399 ms    P Axis 64 degrees    R Axis -17 degrees    T Axis -16 degrees    Diagnosis Line Sinus tachycardia  Minimal voltage criteria for LVH, may be normal variant  Possible Anterolateral infarct , age undetermined  Abnormal ECG        Radiology:     MRI brain/C/T/L w/wo 7/27/23:    CNS STRUCTURES:  The distal cord maintains intact morphology and signal intensity. The   conus is normally positioned at L1. Nerve roots of the cauda equina   appear intact. No pathologic enhancement occurs within the thecal sac.   No nerve root nodularity, clumping or dural adherence is recognized.    IMPRESSION:    1. BRAIN: No abnormal brain enhancement. Cerebral hemispheric frontal   subcortical white matter lesions are nonspecific. The pattern suggests   migraine disease. Subcortical lesions of ischemic white matter disease   could have a similar appearance. The differential diagnosis for these   lesions remains broad. This differential diagnosis would also include   other inflammatory, infectious, demyelinating, metabolic and conceivably   other etiologies. Please note, however, that the extent of this disease   is mild.    2. CERVICAL SPINE: No abnormal cervical enhancement. Reversal of the   cervical lordosis and moderate mid cervical degenerative disc disease are   findings unchanged from 7/20/2023, see prior report    3. THORACIC SPINE: No abnormal thoracic enhancement. Thoracic cord   appears intact. T2-T3 shallow right foraminal disc protrusion (disc   herniation) is associated with slight grade 1 anterior listhesis T2 on T3    4. LUMBAR SPINE: No abnormal lumbar enhancement. Intact conus and   cauda equina. No significant lumbar disc pathology. Transitional   morphology thoracic lumbar junction    5. There is a generalized pattern of patchy signal heterogeneity on the   T1-weighted images that is nonspecific. This may reflect chronic anemia   or other systemic process although is without focal strongly suspicious   lesion

## 2023-07-28 NOTE — PROGRESS NOTE ADULT - ATTENDING COMMENTS
Sensory exam:  7/13: Decreased vibration in the R MM and absent in the L knee.  Proprioception: decreased in the left fingers and left knee.  Slightly slow in the right toe.  7/28: Absent vibration in both knees.   Loss of proprioception in bilateral distal PIP, intact at bilateral ankles.    A/P  Ms. Perez is a 44 yo woman with 8 months of progressively worsening painful paresthesias and arreflexic.  There is no etiology found for her weakness - either in the PNS or CNS - there are normal motor nerve conduction studies and there is no lesion in the brain or spinal cord to explain the weakness - there is a limited motor exam due to pain (with full effort, she is 4+ --> 5/5 throughout).  The character of her sensory symptoms and areflexia are more consistent with a large and small fiber sensory polyneuropathy but due to artifacts it is not possible to confirm or exclude a large fiber sensory polyneuropathy with the EMG/NCS.  A small fiber polyneuropathy can never be assessed with NCSs.  An outpatient NCS is needed to assess for a large fiber sensory polyneuropathy.  Pattern of sensory loss and changes over time are unusual.      For neuropathic pain - continue pregabalin 200 mg BID and add duloxetine 30 mg daily with dinner.  Can increase to 60 mg daily if she tolerates it after 3 days.     We will continue our extensive work up for etiologies for a small and possible large fiber sensory polyneuropathy - I agree with work up as above.     New onset of abnormal generalized movements with preserved consciousness of uncertain etiology.  Continuous EEG.     D/W patient and   Thank you Sensory exam:  7/13: Decreased vibration in the R MM and absent in the L knee.  Proprioception: decreased in the left fingers and left knee.  Slightly slow in the right toe.  7/28: Absent vibration in both knees.   Loss of proprioception in bilateral distal PIP, intact at bilateral ankles.    A/P  Ms. Perez is a 42 yo woman with 8 months of progressively worsening painful paresthesias and areflexia.  There is no etiology found for her weakness - either in the PNS or CNS - there are normal motor nerve conduction studies and there is no lesion in the brain or spinal cord to explain the weakness - there is a limited motor exam due to pain (with full effort, she is 4+ --> 5/5 throughout).  The character of her sensory symptoms and areflexia are more consistent with a large and small fiber sensory polyneuropathy but due to artifacts it is not possible to confirm or exclude a large fiber sensory polyneuropathy with the EMG/NCS.  A small fiber polyneuropathy can never be assessed with NCSs.  An outpatient NCS is needed to assess for a large fiber sensory polyneuropathy.  Pattern of sensory loss and changes over time are unusual.      For neuropathic pain - continue pregabalin 200 mg BID and add duloxetine 30 mg daily with dinner.  Can increase to 60 mg daily if she tolerates it after 3 days.     We will continue our extensive work up for etiologies for a possible small and large fiber sensory polyneuropathy - I agree with work up as above.     New onset of abnormal generalized movements with preserved consciousness of uncertain etiology.  Continuous EEG.     D/W patient and her father.   Thank you

## 2023-07-28 NOTE — PROGRESS NOTE ADULT - ATTENDING COMMENTS
43F with PMH of sickle cell trait, asthma, peripheral neuropathy who presents s/p fall with ongoing weakness/paresthesia. Neuro on board. Further workup in progress to eval for other neuro culprits. MRI H/spine showed no acute pathologies. LP done. Prelim cell studies not remarkable. Additional serologies in progress. Hospital course notable for questionable seizure-like activities - EEG pending. Remains hospitalized pending further workup.     Pt seen at bedside. O/N events noted. No complaints of pain. Periodic spasms. On exam - no sig changes. In bed, calm, comfortable.     #LE weakness, paresthesia    -Cont baclofen - standing. Dose increased.  -EEG pending.  -Appreciate further neuro inputs.     Rest of plan as above. DC planning to ACUTE rehab.
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
## 2023-07-28 NOTE — PROGRESS NOTE ADULT - SUBJECTIVE AND OBJECTIVE BOX
PROGRESS NOTE:   Authored by Dr. Bharat Venegas MD (PGY-1).     Patient is a 43y old  Female who presents with a chief complaint of Weakness in lower and upper extremities (27 Jul 2023 08:24)      SUBJECTIVE / OVERNIGHT EVENTS:  No acute events overnight. She has no acute complaints this morning.     MEDICATIONS  (STANDING):  amLODIPine   Tablet 5 milliGRAM(s) Oral daily  artificial  tears Solution 1 Drop(s) Both EYES four times a day  lidocaine   4% Patch 1 Patch Transdermal daily  nystatin/triamcinolone Ointment 1 Application(s) Topical two times a day  polyethylene glycol 3350 17 Gram(s) Oral daily  pregabalin 200 milliGRAM(s) Oral two times a day    MEDICATIONS  (PRN):  baclofen 10 milliGRAM(s) Oral every 8 hours PRN Muscle Spasm  diphenhydrAMINE 50 milliGRAM(s) Oral once PRN Anxiety  diphenhydrAMINE Injectable 25 milliGRAM(s) IV Push every 6 hours PRN Rash and/or Itching  LORazepam   Injectable 2 milliGRAM(s) IV Push once PRN Anxiety  morphine  - Injectable 4 milliGRAM(s) IV Push daily PRN Moderate Pain (4 - 6)      CAPILLARY BLOOD GLUCOSE        I&O's Summary      PHYSICAL EXAM:  Vital Signs Last 24 Hrs  T(C): 36.3 (28 Jul 2023 05:33), Max: 37.2 (27 Jul 2023 21:31)  T(F): 97.4 (28 Jul 2023 05:33), Max: 99 (27 Jul 2023 21:31)  HR: 97 (28 Jul 2023 05:33) (82 - 97)  BP: 142/92 (28 Jul 2023 05:33) (100/60 - 144/93)  BP(mean): --  RR: 19 (28 Jul 2023 05:33) (18 - 19)  SpO2: 100% (28 Jul 2023 05:33) (95% - 100%)    Parameters below as of 28 Jul 2023 05:33  Patient On (Oxygen Delivery Method): room air        CONSTITUTIONAL: NAD, well-developed  HEET: MMM, EOMI, PERRLA  NECK: supple  RESPIRATORY: Normal respiratory effort; lungs are clear to auscultation bilaterally  CARDIOVASCULAR: Regular rate and rhythm, normal S1 and S2, no murmur/rub/gallop; No lower extremity edema; Peripheral pulses are 2+ bilaterally  ABDOMEN: Nontender to palpation, normoactive bowel sounds, no rebound/guarding; No hepatosplenomegaly  MUSCULOSKELETAL: no clubbing or cyanosis of digits; no joint swelling or tenderness to palpation  NEURO: 5/5 strength in upper and lower extremities b/l on flexion and extension. Diminished sensation in lower extremities  PSYCH: A+O to person, place, and time; affect appropriate  SKIN: No rash    LABS:                        11.3   4.53  )-----------( 288      ( 27 Jul 2023 07:10 )             35.0     07-27    141  |  105  |  13  ----------------------------<  93  3.7   |  24  |  0.55    Ca    9.7      27 Jul 2023 07:10  Phos  4.8     07-27  Mg     1.90     07-27    TPro  7.3  /  Alb  3.5  /  TBili  0.4  /  DBili  x   /  AST  81<H>  /  ALT  48<H>  /  AlkPhos  56  07-27    PT/INR - ( 27 Jul 2023 07:10 )   PT: 12.0 sec;   INR: 1.07 ratio         PTT - ( 27 Jul 2023 07:10 )  PTT:29.5 sec      Urinalysis Basic - ( 27 Jul 2023 07:10 )    Color: x / Appearance: x / SG: x / pH: x  Gluc: 93 mg/dL / Ketone: x  / Bili: x / Urobili: x   Blood: x / Protein: x / Nitrite: x   Leuk Esterase: x / RBC: x / WBC x   Sq Epi: x / Non Sq Epi: x / Bacteria: x          Tele Reviewed:    RADIOLOGY & ADDITIONAL TESTS:  Results Reviewed:   Imaging Personally Reviewed:  Electrocardiogram Personally Reviewed:

## 2023-07-28 NOTE — PROGRESS NOTE ADULT - PROBLEM SELECTOR PLAN 1
Ascending weakness and paresthesias, beginning in 3/2023  Possible CIDP vs Guillan [place] vs transmyelitis   EMG: No electrophysiologic evidence of a motor polyradiculoneuropathy - findings are not consistent with a diagnosis of CIDP  MRI head/cervical/thoracic: Mid cervical degenerative disc disease, C4-C5 broad irregular right central and C5-C6 focal right foraminal disc protrusion (disc herniation) that cause ventral cord deformity. Not consistent with clinical presentation per neurosurgery    Plan:  - LP unable to be performed by procedure team 7/25/23  - LP under sedation performed 7/27; f/u CSF studies  - Consulted  MRI and anesthesia for MRI under sedation  - MRI under sedation completed 7/26/23  - Also started on baclofen 10mg and motrin 600mg for additional pain control  - Neurology following; appreciate recs  -EEG ordered Ascending weakness and paresthesias, beginning in 3/2023  Possible CIDP vs Guillan [place] vs transmyelitis   EMG: No electrophysiologic evidence of a motor polyradiculoneuropathy - findings are not consistent with a diagnosis of CIDP  MRI head/cervical/thoracic: Mid cervical degenerative disc disease, C4-C5 broad irregular right central and C5-C6 focal right foraminal disc protrusion (disc herniation) that cause ventral cord deformity. Not consistent with clinical presentation per neurosurgery    Plan:  - LP unable to be performed by procedure team 7/25/23  - LP under sedation performed 7/27; CSF studies have been unremarkable so far  - Consulted  MRI and anesthesia for MRI under sedation  - MRI under sedation completed 7/26/23  - Also started on baclofen 10mg and motrin 600mg for additional pain control  - Neurology following; appreciate recs  -EEG ordered for shaking of extremities

## 2023-07-28 NOTE — CHART NOTE - NSCHARTNOTEFT_GEN_A_CORE
EEG preliminary read (not final) on the initial recording hour(s) =   Reviewed from: 14:48-18:32    -Normal awake video-EEG.  -No epileptiform abnormalities.  -Multiple events of nonspecific movements of the body and all extremities with eyes closed, at times with purposeful and antigravity movements of one or both arms such as pointing to her mouth or to her back, often with grimacing and/or rightward head turning and right or bilateral hip and knee flexion, during one event nonrhythmic slightly jerky movements of RLE and trunk. EEG shows the patient's usual awake background during these times, with intermittent muscle and movement artifact.  These events are not epileptic in nature.    Final report to follow tomorrow morning after completion of study.    [facility] EEG Reading Room Ph#: [phone]  Epilepsy Answering Service after 5PM and before 8:30AM: Ph#: [phone]

## 2023-07-28 NOTE — PROGRESS NOTE ADULT - ASSESSMENT
Ms Perez is a 42yo woman with a hx of peripheral neuropathy, sickle cell trait, asthma, and anemia who was admitted after suffering a fall with inability to get up due to 4 months of ongoing bilateral lower and upper extremity weakness and paresthesias. Homocysteine slightly elevated however rest of lab workup unremarkable so far. EMG not consistent with CIDP. MRI head, C/T spine completed 7/21 showing C4-C6 disc protrusions causing ventral cord deformity, not consistent with clinical presentation.

## 2023-07-28 NOTE — CHART NOTE - NSCHARTNOTEFT_GEN_A_CORE
LP under sedation 7/27 8:00, with new onset muscle spasms and tremors. Neurology contacted in AM by primary team regarding post LP clinical status, reccs made. Advised to increase baclofen dose 10mg q8h (pre procedure receiving Baclofen 5mg q8h, Lyrica 200mg BID) should sxs persist.       RN notified provider 7/27 22:12 with concern of full body spasms and tremors, ongoing since LP in AM. Neurology notified of symptoms, advised increasing baclofen dose. Provider contacted again at 23:20 with increasing frequency of muscle spasms, episode of trismus. Patient seen at bedside, witnessed full body spasms with episodic trismus. RN and patient's son present at bedside. Responsive to commands during aforementioned episodes without changes in mental status. Review of systems negative for HA, confusion, n/v, fevers, chills, back pain, tongue biting, incontinence, LOC. VSS exam unremarkable.     Patient was offered several options for symptom relief (baclofen, ativan). Patient declined stating she will not take any new medications until assessed by neurology in person.  7/28 2:00 consented to receive baclofen 10mg. LP under sedation 7/27 8:00. Pt reported new onset muscle spasms and tremors following LP. Neurology contacted in AM by primary team regarding changes in clinical status, recc included increase baclofen dose 10mg q8h (pre procedure receiving Baclofen 5mg q8h, Lyrica 200mg BID) should sxs persist.       RN notified provider 7/27 22:12 to evaluate full body spasms and tremors, known to be occurring intermittently since LP in AM. Neurology notified of symptoms, advised increasing baclofen dose. Provider contacted again at 23:20 with increasing frequency of muscle spasms, episode of trismus. Was notified by the nurse that patient was not protecting her airway with concern for seizure. Patient seen and evaluated at bedside. Introduced self as covering physician for the night. At this time, witnessed full body spasms of b/l UE, LE with trismus for 1 min. Spontaneous improvement in symptoms. Responsive to commands during aforementioned episodes without changes in mental status. Review of systems negative for HA, confusion, n/v, fevers, chills, back pain, tongue biting, incontinence, LOC. VSS exam unremarkable.    Patient was offered several options for symptom relief (baclofen, ativan). Patient declined stating she will not take any new medications until assessed by neurology in person.   7/28 2:00 consented to receive baclofen 10mg.  RN and patient's son present at bedside.

## 2023-07-29 LAB — PROLACTIN SERPL-MCNC: 29.1 NG/ML — HIGH (ref 3.4–24.1)

## 2023-07-29 PROCEDURE — 99221 1ST HOSP IP/OBS SF/LOW 40: CPT

## 2023-07-29 PROCEDURE — 99233 SBSQ HOSP IP/OBS HIGH 50: CPT | Mod: GC

## 2023-07-29 PROCEDURE — 99233 SBSQ HOSP IP/OBS HIGH 50: CPT

## 2023-07-29 RX ORDER — ENOXAPARIN SODIUM 100 MG/ML
40 INJECTION SUBCUTANEOUS EVERY 24 HOURS
Refills: 0 | Status: DISCONTINUED | OUTPATIENT
Start: 2023-07-29 | End: 2023-07-29

## 2023-07-29 RX ORDER — BACLOFEN 100 %
15 POWDER (GRAM) MISCELLANEOUS EVERY 8 HOURS
Refills: 0 | Status: DISCONTINUED | OUTPATIENT
Start: 2023-07-29 | End: 2023-07-31

## 2023-07-29 RX ORDER — ENOXAPARIN SODIUM 100 MG/ML
40 INJECTION SUBCUTANEOUS EVERY 12 HOURS
Refills: 0 | Status: DISCONTINUED | OUTPATIENT
Start: 2023-07-29 | End: 2023-07-29

## 2023-07-29 RX ORDER — ENOXAPARIN SODIUM 100 MG/ML
40 INJECTION SUBCUTANEOUS EVERY 12 HOURS
Refills: 0 | Status: DISCONTINUED | OUTPATIENT
Start: 2023-07-29 | End: 2023-07-31

## 2023-07-29 RX ORDER — ACETAMINOPHEN 500 MG
1000 TABLET ORAL ONCE
Refills: 0 | Status: COMPLETED | OUTPATIENT
Start: 2023-07-29 | End: 2023-07-29

## 2023-07-29 RX ADMIN — Medication 10 MILLIGRAM(S): at 12:20

## 2023-07-29 RX ADMIN — Medication 1 APPLICATION(S): at 06:00

## 2023-07-29 RX ADMIN — Medication 10 MILLIGRAM(S): at 05:56

## 2023-07-29 RX ADMIN — Medication 400 MILLIGRAM(S): at 21:58

## 2023-07-29 RX ADMIN — AMLODIPINE BESYLATE 5 MILLIGRAM(S): 2.5 TABLET ORAL at 05:56

## 2023-07-29 RX ADMIN — DULOXETINE HYDROCHLORIDE 30 MILLIGRAM(S): 30 CAPSULE, DELAYED RELEASE ORAL at 21:58

## 2023-07-29 RX ADMIN — Medication 1 APPLICATION(S): at 19:06

## 2023-07-29 RX ADMIN — Medication 15 MILLIGRAM(S): at 21:58

## 2023-07-29 RX ADMIN — ENOXAPARIN SODIUM 40 MILLIGRAM(S): 100 INJECTION SUBCUTANEOUS at 19:14

## 2023-07-29 RX ADMIN — Medication 1000 MILLIGRAM(S): at 22:58

## 2023-07-29 RX ADMIN — Medication 200 MILLIGRAM(S): at 19:15

## 2023-07-29 NOTE — PROGRESS NOTE ADULT - ASSESSMENT
{\rtf1\[id number]\ansi\dgjclig8024\ftnbj\uc1\deff0  {\fonttbl{\f0 \fnil Segoe UI;}{\f1 \fnil \fcharset0 Segoe UI;}{\f2 \fnil Times New Fabien;}}  {\colortbl ;\sdt905\nptar510\lein748 ;\red0\green0\blue0 ;\red0\green0\blue0 ;}  {\stylesheet{\f0\fs20 Normal;}{\cs1 Default Paragraph Font;}{\cs2\f0\fs16 Line Number;}{\cs3\f2\fs24 Hyperlink;}}  {\*\revtbl{Unknown;}}  \mtszrp49521\pcfpln34159\zyjse0056\ohxfe0931\taodz4546\oozaf7359\xoftavn759\zgakksf304\nogrowautofit\ercizv162\formshade\nofeaturethrottle1\dntblnsbdb\fet4\aendnotes\aftnnrlc\pgbrdrhead\pgbrdrfoot  \sectd\xzobkm52141\hxpdnt88519\guttersxn0\usglunum8634\duzfzsmj2265\zcjzlhap0611\ldblmbra1518\pmooabk751\jbiohey134\sbkpage\pgncont\pgndec  \plain\plain\f0\fs24\ql\plain\f0\fs24\plain\f0\fs20\doxr9236\hich\f0\dbch\f0\loch\f0\fs20 Ms. Perez is a 44 yo woman with 8 months of progressively worsening painful paresthesias and areflexia.\par  There is no etiology found for her weakness - either in the PNS or CNS - there are normal motor nerve conduction studies and there is no lesion in the brain or spinal cord to explain the weakness - there is a limited motor exam due to pain (with full   effort, she is 4+ --> 5/5 throughout).\par  The character of her sensory symptoms and areflexia are more consistent with a large and small fiber sensory polyneuropathy but due to artifacts it is not possible to confirm or exclude a large fiber sensory polyneuropathy with the EMG/NCS.  A small fiber   polyneuropathy can never be assessed with NCSs.  An outpatient NCS is needed to assess for a large fiber sensory polyneuropathy.  Pattern of sensory loss and changes over time are unusual.  Skin biopsy for intra-epidermal and sweat gland nerve fiber density   needs to be done to assess for a small fiber neuropathy. \par  \par  For neuropathic pain - continue pregabalin 200 mg BID and duloxetine 30 mg daily with dinner.  Can increase to 60 mg daily if she tolerates it after 3 more days.  Increased baclofen for cramps - 15 mg TID\par  \par  We will continue our extensive work up for etiologies for a possible small and large fiber sensory polyneuropathy - work up as our note on 7/28/23. \par  \par  New onset of abnormal generalized movements with preserved consciousness of uncertain etiology - EEG consistent with psychogenic nonepileptic spells.  I explained this diagnosis with the patient and father and that the next step is usually a consultation   with a psychiatrist or psychologist.   They asked why these started immediately after the LP and I explained that this is not a known complication of LP.  She does not want to see a psychiatrist or psychologist. \par  \par  D/W patient and her father. \par  Thank you. \plain\f1\fs20\hfxn2089\hich\f1\dbch\f1\loch\f1\cf2\fs20\strike\plain\f1\fs20\neuk6589\hich\f1\dbch\f1\loch\f1\cf2\fs20\plain\f0\fs20\qkvj2843\hich\f0\dbch\f0\loch\f0\fs20\par  }

## 2023-07-29 NOTE — PROGRESS NOTE ADULT - SUBJECTIVE AND OBJECTIVE BOX
PROGRESS NOTE:   Authored by Dr. Bharat Venegas MD (PGY-1).     Patient is a 43y old  Female who presents with a chief complaint of Weakness in lower and upper extremities (28 Jul 2023 14:53)      SUBJECTIVE / OVERNIGHT EVENTS:  No acute events overnight. She has no acute complaints this morning.     MEDICATIONS  (STANDING):  amLODIPine   Tablet 5 milliGRAM(s) Oral daily  artificial  tears Solution 1 Drop(s) Both EYES four times a day  baclofen 10 milliGRAM(s) Oral every 8 hours  DULoxetine 30 milliGRAM(s) Oral at bedtime  enoxaparin Injectable 40 milliGRAM(s) SubCutaneous every 12 hours  lidocaine   4% Patch 1 Patch Transdermal daily  nystatin/triamcinolone Ointment 1 Application(s) Topical two times a day  polyethylene glycol 3350 17 Gram(s) Oral daily  pregabalin 200 milliGRAM(s) Oral two times a day    MEDICATIONS  (PRN):  diphenhydrAMINE 50 milliGRAM(s) Oral once PRN Anxiety  diphenhydrAMINE Injectable 25 milliGRAM(s) IV Push every 6 hours PRN Rash and/or Itching  LORazepam   Injectable 2 milliGRAM(s) IV Push once PRN Anxiety  morphine  - Injectable 4 milliGRAM(s) IV Push daily PRN Moderate Pain (4 - 6)      CAPILLARY BLOOD GLUCOSE        I&O's Summary      PHYSICAL EXAM:  Vital Signs Last 24 Hrs  T(C): 36.6 (29 Jul 2023 12:17), Max: 36.9 (28 Jul 2023 22:20)  T(F): 97.9 (29 Jul 2023 12:17), Max: 98.5 (29 Jul 2023 05:56)  HR: 91 (29 Jul 2023 12:17) (81 - 105)  BP: 110/81 (29 Jul 2023 12:17) (107/83 - 144/89)  BP(mean): --  RR: 18 (29 Jul 2023 12:17) (18 - 18)  SpO2: 97% (29 Jul 2023 12:17) (95% - 100%)    Parameters below as of 29 Jul 2023 12:17  Patient On (Oxygen Delivery Method): room air        CONSTITUTIONAL: NAD, well-developed  HEET: MMM, EOMI, PERRLA  NECK: supple  RESPIRATORY: Normal respiratory effort; lungs are clear to auscultation bilaterally  CARDIOVASCULAR: Regular rate and rhythm, normal S1 and S2, no murmur/rub/gallop; No lower extremity edema; Peripheral pulses are 2+ bilaterally  ABDOMEN: Nontender to palpation, normoactive bowel sounds, no rebound/guarding; No hepatosplenomegaly  MUSCULOSKELETAL: no clubbing or cyanosis of digits; no joint swelling or tenderness to palpation  NEURO: 5/5 strength in upper and lower extremities and diminished sensation in lower extremities b/l  PSYCH: A+O to person, place, and time; affect appropriate  SKIN: No rash    LABS:                      Tele Reviewed:    RADIOLOGY & ADDITIONAL TESTS:  Results Reviewed:   Imaging Personally Reviewed:  Electrocardiogram Personally Reviewed:

## 2023-07-29 NOTE — PROGRESS NOTE ADULT - ATTENDING COMMENTS
43F with PMH of sickle cell trait, asthma, peripheral neuropathy who presents s/p fall with ongoing weakness/paresthesia. Neuro on board. Further workup in progress to eval for other neuro culprits. MRI H/spine showed no acute pathologies. LP done. Prelim cell studies not remarkable. Additional serologies in progress. Hospital course notable for questionable seizure-like activities - EEG prelim shows no evidence of seizures. Remains hospitalized pending further workup.     Pt seen at bedside. No o/n events. Ongoing episodic jerks/spasms. Ongoing nonspecific pain, paresthesia. No labs today.    #LE weakness, paresthesia    -Neuro recs appreciated – PRN pain, baclofen, lyrica. Added duloxetine.  -F/u final EEG.  -CT Chest. ECHO. Fat Pad bx. R/o amyloidosis, paraneoplastic process.     Rest of plan as above. DC planning to ACUTE rehab.

## 2023-07-29 NOTE — PROGRESS NOTE ADULT - PROBLEM SELECTOR PLAN 1
Ascending weakness and paresthesias, beginning in 3/2023  Possible CIDP vs [place] vs transmyelitis   EMG: No electrophysiologic evidence of a motor polyradiculoneuropathy - findings are not consistent with a diagnosis of CIDP  MRI head/cervical/thoracic: Mid cervical degenerative disc disease, C4-C5 broad irregular right central and C5-C6 focal right foraminal disc protrusion (disc herniation) that cause ventral cord deformity. Not consistent with clinical presentation per neurosurgery    Plan:  - LP under sedation performed 7/27; CSF studies have been unremarkable so far  - MRI under sedation completed 7/26/23  - Also started on baclofen 10mg and motrin 600mg and duloxetine 30mg for additional pain control  - Neurology following; appreciate recs  -EEG normal  - f/u transthyretin, CT chest, TTE, fatpad aspiration

## 2023-07-29 NOTE — CHART NOTE - NSCHARTNOTEFT_GEN_A_CORE
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 Hughes    Ordering Attending Physician: Dr. Doug Montenegro    Pertinent Medical History:    Pertinent labs:                      11.2   3.93  )-----------( 272      ( 25 Jul 2023 07:30 )             34.0       07-25    139  |  103  |  13  ----------------------------<  94  4.0   |  25  |  0.50    Ca    9.7      25 Jul 2023 07:30  Phos  4.4     07-25  Mg     1.80     07-25    TPro  6.5  /  Alb  3.4  /  TBili  0.4  /  DBili  x   /  AST  88<H>  /  ALT  51<H>  /  AlkPhos  57  07-25      PT/INR - ( 25 Jul 2023 07:30 )   PT: 12.9 sec;   INR: 1.11 ratio         PTT - ( 25 Jul 2023 07:30 )  PTT:29.6 sec        Patient and Family Aware ? Yes.

## 2023-07-29 NOTE — PROGRESS NOTE ADULT - SUBJECTIVE AND OBJECTIVE BOX
She continues to have episodes of cramps but also involuntary movements that she cannot control which began after the LP.  Pregabalin, duloxetine, baclofen have not helped her pain at all.  No side effects on pregabalin and baclofen.    Vital Signs Last 24 Hrs  T(C): 36.9 (29 Jul 2023 21:05), Max: 36.9 (29 Jul 2023 05:56)  T(F): 98.4 (29 Jul 2023 21:05), Max: 98.5 (29 Jul 2023 05:56)  HR: 93 (29 Jul 2023 21:05) (85 - 101)  BP: 132/95 (29 Jul 2023 21:05) (110/81 - 144/89)  BP(mean): --  RR: 18 (29 Jul 2023 21:05) (18 - 18)  SpO2: 95% (29 Jul 2023 21:05) (95% - 100%)    Parameters below as of 29 Jul 2023 21:05  Patient On (Oxygen Delivery Method): room air    Several episodes of cramps in the leg with slight posturing of the leg.     Study Date: 07/28/23 14:48 - 07/29/23 14:55  Duration: 23 hr 59 min  EEG Classification / Summary:  Normal EEG in the awake, drowsy, and asleep states.   -No focal or epileptiform abnormalities are captured.   -Numerous clinical events of nonspecific movements of the body and all extremities with eyes closed, often with grimacing and/or rightward head turning, at times with purposeful and antigravity movements of one or both arms such as pointing to her mouth or to her back. During some events there are nonrhythmic slightly jerky movements of the trunk and/or RLE. EEG shows the patient's usual awake background during these times.    Clinical Impression:  Normal video-EEG.   The captured numerous clinical events are not epileptic in nature.

## 2023-07-29 NOTE — CONSULT NOTE ADULT - ASSESSMENT
Patient is a 43 year old F w/ a PMHx of of peripheral neuropathy (diagnosed at OSH in 3/2023), sickle cell trait, asthma, and anemia who presented to the ED by EMS after falling at home. Pt states she's fallen 3 times in the past few weeks. She notes experiencing weakness and paraesthesias that began in her hands and distal lower extremities in 3/2023. Patient also now with recurrent episodes of full body jerks with retained awareness, onset s/p lumbar puncture. Unclear etiology of patient symptomatology. General surgery consulted for abdominal fat pad biopsy to r/o amyloidosis.     Recommendations:   - Patient is candidate for bedside abdominal fat pad biopsy      - Discussed procedure, patient refusing bedside procedure at this time.  Requesting to hold off until Monday, 7/31 and for procedure to be done in the operating room   - Will follow   - Remainder care per primary     Plan discussed with Dr. Krunal TOURE Team Surgery   [id number]     Patient is a 43 year old F w/ a PMHx of of peripheral neuropathy (diagnosed at OSH in 3/2023), sickle cell trait, asthma, and anemia who presented to the ED by EMS after falling at home. Pt states she's fallen 3 times in the past few weeks. She notes experiencing weakness and paraesthesias that began in her hands and distal lower extremities in 3/2023. Patient also now with recurrent episodes of full body jerks with retained awareness, onset s/p lumbar puncture. Unclear etiology of patient symptomatology. General surgery consulted for abdominal fat pad biopsy to r/o amyloidosis.     Recommendations:   - Patient is candidate for bedside abdominal fat pad biopsy      - Discussed procedure, patient refusing bedside procedure at this time.  Requesting to hold off until Monday, 7/31 and for procedure to be done in the operating room   - Will follow   - Remainder care per primary     Plan discussed with Dr. Krunal RAMÍREZ Team Surgery   [id number]

## 2023-07-29 NOTE — EEG REPORT - NS EEG TEXT BOX
KAILASH FUENTES [facility]-[id number]     Study Date: 07/28/23 14:48 - 07/29/23 08:00  Duration: 17 hr 11 min  --------------------------------------------------------------------------------------------------  History:  CC/ HPI Patient is a 43y old  Female who presents with a chief complaint of Weakness in lower and upper extremities (28 Jul 2023 14:53)    MEDICATIONS  (STANDING):  amLODIPine   Tablet 5 milliGRAM(s) Oral daily  artificial  tears Solution 1 Drop(s) Both EYES four times a day  baclofen 10 milliGRAM(s) Oral every 8 hours  DULoxetine 30 milliGRAM(s) Oral at bedtime  enoxaparin Injectable 40 milliGRAM(s) SubCutaneous every 12 hours  lidocaine   4% Patch 1 Patch Transdermal daily  nystatin/triamcinolone Ointment 1 Application(s) Topical two times a day  polyethylene glycol 3350 17 Gram(s) Oral daily  pregabalin 200 milliGRAM(s) Oral two times a day    --------------------------------------------------------------------------------------------------  Study Interpretation:    [[[Abbreviation Key:  PDR=alpha rhythm/posterior dominant rhythm. A-P=anterior posterior.  Amplitude: ‘very low’:<20; ‘low’:20-49; ‘medium’:; ‘high’:>150uV.  Persistence for periodic/rhythmic patterns (% of epoch) ‘rare’:<1%; ‘occasional’:1-10%; ‘frequent’:10-50%; ‘abundant’:50-90%; ‘continuous’:>90%.  Persistence for sporadic discharges: ‘rare’:<1/hr; ‘occasional’:1/min-1/hr; ‘frequent’:>1/min; ‘abundant’:>1/10 sec.  RPP=rhythmic and periodic patterns; GRDA=generalized rhythmic delta activity; FIRDA=frontal intermittent GRDA; LRDA=lateralized rhythmic delta activity; TIRDA=temporal intermittent rhythmic delta activity;  LPD=PLED=lateralized periodic discharges; GPD=generalized periodic discharges; BIPDs =bilateral independent periodic discharges; Mf=multifocal; SIRPDs=stimulus induced rhythmic, periodic, or ictal appearing discharges; BIRDs=brief potentially ictal rhythmic discharges >4 Hz, lasting .5-10s; PFA (paroxysmal bursts >13 Hz or =8 Hz <10s).  Modifiers: +F=with fast component; +S=with spike component; +R=with rhythmic component.  S-B=burst suppression pattern.  Max=maximal. N1-drowsy; N2-stage II sleep; N3-slow wave sleep. SSS/BETS=small sharp spikes/benign epileptiform transients of sleep. HV=hyperventilation; PS=photic stimulation]]]    Daily EEG Visual Analysis    FINDINGS:      Background:  Continuity: Continuous  Symmetry: Symmetric  Posterior dominant rhythm (PDR): 9.5 Hz, reactive to eye closure. Symmetric low-amplitude frontal beta in wakefulness.  State Change: Present  Voltage: Normal  Anterior Posterior Gradient: Present  Other background findings: None  Breach: Absent    Background Slowing:  Generalized slowing: None  Focal slowing: None    State Changes:   Drowsiness is characterized by fragmentation, attenuation, and slowing of the background activity.  Stage 2 sleep is characterized by symmetric K complexes and sleep spindles.     Interictal Findings:  None    Electrographic and Electroclinical seizures:  None    Other Clinical Events:  Numerous button-press events with nonspecific movements of the body and all extremities with eyes closed, at times with purposeful and antigravity movements of one or both arms such as pointing to her mouth or to her back, often with grimacing and/or rightward head turning and right or bilateral hip and knee flexion. During some events there are nonrhythmic slightly jerky movements of the trunk and/or RLE. EEG shows the patient's usual awake background during these times, with intermittent muscle and movement artifact.    Activation Procedures:   Hyperventilation is not performed.    Photic stimulation is performed from 2-6 Hz and does not elicit any abnormalities. EEG technologist notes that patient does not tolerate further photic stimulation.    Artifacts:  Intermittent myogenic and movement artifacts are present. Poor electrode impedance at times limits interpretation.    EKG:  Single-lead EKG shows regular rhythm.    EEG Classification / Summary:  Normal EEG in the awake, drowsy, and asleep states.   -No focal or epileptiform abnormalities are captured.   -Numerous clinical events of nonspecific movements of the body and all extremities with eyes closed, often with grimacing and/or rightward head turning, at times with purposeful and antigravity movements of one or both arms such as pointing to her mouth or to her back. During some events there are nonrhythmic slightly jerky movements of the trunk and/or RLE. EEG shows the patient's usual awake background during these times.    Clinical Impression:  Normal video-EEG.   The captured numerous clinical events are not epileptic in nature.    In the absence of further clinical concern, consider disconnecting study.       -------------------------------------------------------------------------------------------------------  [facility] EEG Reading Room Ph#: [phone]  Epilepsy Answering Service after 5PM and before 8:30AM: Ph#: [phone]    Luanne Helton MD  Attending Physician, [facility]   KAILASH FUENTES [facility]-[id number]     Study Date: 07/28/23 14:48 - 07/29/23 14:55  Duration: 23 hr 59 min  --------------------------------------------------------------------------------------------------  History:  CC/ HPI Patient is a 43y old  Female who presents with a chief complaint of Weakness in lower and upper extremities (28 Jul 2023 14:53)    MEDICATIONS  (STANDING):  amLODIPine   Tablet 5 milliGRAM(s) Oral daily  artificial  tears Solution 1 Drop(s) Both EYES four times a day  baclofen 10 milliGRAM(s) Oral every 8 hours  DULoxetine 30 milliGRAM(s) Oral at bedtime  enoxaparin Injectable 40 milliGRAM(s) SubCutaneous every 12 hours  lidocaine   4% Patch 1 Patch Transdermal daily  nystatin/triamcinolone Ointment 1 Application(s) Topical two times a day  polyethylene glycol 3350 17 Gram(s) Oral daily  pregabalin 200 milliGRAM(s) Oral two times a day    --------------------------------------------------------------------------------------------------  Study Interpretation:    [[[Abbreviation Key:  PDR=alpha rhythm/posterior dominant rhythm. A-P=anterior posterior.  Amplitude: ‘very low’:<20; ‘low’:20-49; ‘medium’:; ‘high’:>150uV.  Persistence for periodic/rhythmic patterns (% of epoch) ‘rare’:<1%; ‘occasional’:1-10%; ‘frequent’:10-50%; ‘abundant’:50-90%; ‘continuous’:>90%.  Persistence for sporadic discharges: ‘rare’:<1/hr; ‘occasional’:1/min-1/hr; ‘frequent’:>1/min; ‘abundant’:>1/10 sec.  RPP=rhythmic and periodic patterns; GRDA=generalized rhythmic delta activity; FIRDA=frontal intermittent GRDA; LRDA=lateralized rhythmic delta activity; TIRDA=temporal intermittent rhythmic delta activity;  LPD=PLED=lateralized periodic discharges; GPD=generalized periodic discharges; BIPDs =bilateral independent periodic discharges; Mf=multifocal; SIRPDs=stimulus induced rhythmic, periodic, or ictal appearing discharges; BIRDs=brief potentially ictal rhythmic discharges >4 Hz, lasting .5-10s; PFA (paroxysmal bursts >13 Hz or =8 Hz <10s).  Modifiers: +F=with fast component; +S=with spike component; +R=with rhythmic component.  S-B=burst suppression pattern.  Max=maximal. N1-drowsy; N2-stage II sleep; N3-slow wave sleep. SSS/BETS=small sharp spikes/benign epileptiform transients of sleep. HV=hyperventilation; PS=photic stimulation]]]    Daily EEG Visual Analysis    FINDINGS:      Background:  Continuity: Continuous  Symmetry: Symmetric  Posterior dominant rhythm (PDR): 9.5 Hz, reactive to eye closure. Symmetric low-amplitude frontal beta in wakefulness.  State Change: Present  Voltage: Normal  Anterior Posterior Gradient: Present  Other background findings: None  Breach: Absent    Background Slowing:  Generalized slowing: None  Focal slowing: None    State Changes:   Drowsiness is characterized by fragmentation, attenuation, and slowing of the background activity.  Stage 2 sleep is characterized by symmetric K complexes and sleep spindles.     Interictal Findings:  None    Electrographic and Electroclinical seizures:  None    Other Clinical Events:  Numerous button-press events with nonspecific movements of the body and all extremities with eyes closed, at times with purposeful and antigravity movements of one or both arms such as pointing to her mouth or to her back, often with grimacing and/or rightward head turning and right or bilateral hip and knee flexion. During some events there are nonrhythmic slightly jerky movements of the trunk and/or RLE. EEG shows the patient's usual awake background during these times, with intermittent muscle and movement artifact.    Activation Procedures:   Hyperventilation is not performed.    Photic stimulation is performed from 2-6 Hz and does not elicit any abnormalities. EEG technologist notes that patient does not tolerate further photic stimulation.    Artifacts:  Intermittent myogenic and movement artifacts are present. Poor electrode impedance and intermittently disconnected electrodes at times limit interpretation.     EKG:  Single-lead EKG shows regular rhythm.    EEG Classification / Summary:  Normal EEG in the awake, drowsy, and asleep states.   -No focal or epileptiform abnormalities are captured.   -Numerous clinical events of nonspecific movements of the body and all extremities with eyes closed, often with grimacing and/or rightward head turning, at times with purposeful and antigravity movements of one or both arms such as pointing to her mouth or to her back. During some events there are nonrhythmic slightly jerky movements of the trunk and/or RLE. EEG shows the patient's usual awake background during these times.    Clinical Impression:  Normal video-EEG.   The captured numerous clinical events are not epileptic in nature.    In the absence of further clinical concern, consider disconnecting study.       ADDENDUM:  Report updated to reflect completion of recording at time stated above.  Originally reported up to 7/29/23 08:00.  No significant change in findings.      -------------------------------------------------------------------------------------------------------  [facility] EEG Reading Room Ph#: [phone]  Epilepsy Answering Service after 5PM and before 8:30AM: Ph#: [phone]    Luanne Helton MD  Attending Physician, [facility]
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
## 2023-07-30 LAB
B2 MICROGLOB CSF-MCNC: 1 MG/L — SIGNIFICANT CHANGE UP (ref 0–2.4)
NT-PROBNP SERPL-SCNC: <36 PG/ML — SIGNIFICANT CHANGE UP
TROPONIN T, HIGH SENSITIVITY RESULT: 6 NG/L — SIGNIFICANT CHANGE UP

## 2023-07-30 PROCEDURE — 99233 SBSQ HOSP IP/OBS HIGH 50: CPT | Mod: GC

## 2023-07-30 PROCEDURE — 99232 SBSQ HOSP IP/OBS MODERATE 35: CPT

## 2023-07-30 RX ORDER — CYCLOBENZAPRINE HYDROCHLORIDE 10 MG/1
10 TABLET, FILM COATED ORAL ONCE
Refills: 0 | Status: COMPLETED | OUTPATIENT
Start: 2023-07-30 | End: 2023-07-30

## 2023-07-30 RX ORDER — BACLOFEN 100 %
10 POWDER (GRAM) MISCELLANEOUS ONCE
Refills: 0 | Status: COMPLETED | OUTPATIENT
Start: 2023-07-30 | End: 2023-07-30

## 2023-07-30 RX ORDER — CYCLOBENZAPRINE HYDROCHLORIDE 10 MG/1
10 TABLET, FILM COATED ORAL ONCE
Refills: 0 | Status: DISCONTINUED | OUTPATIENT
Start: 2023-07-30 | End: 2023-07-30

## 2023-07-30 RX ADMIN — Medication 200 MILLIGRAM(S): at 18:50

## 2023-07-30 RX ADMIN — ENOXAPARIN SODIUM 40 MILLIGRAM(S): 100 INJECTION SUBCUTANEOUS at 05:47

## 2023-07-30 RX ADMIN — CYCLOBENZAPRINE HYDROCHLORIDE 10 MILLIGRAM(S): 10 TABLET, FILM COATED ORAL at 05:47

## 2023-07-30 RX ADMIN — Medication 15 MILLIGRAM(S): at 14:02

## 2023-07-30 RX ADMIN — Medication 200 MILLIGRAM(S): at 05:47

## 2023-07-30 RX ADMIN — Medication 1 APPLICATION(S): at 06:21

## 2023-07-30 RX ADMIN — DULOXETINE HYDROCHLORIDE 30 MILLIGRAM(S): 30 CAPSULE, DELAYED RELEASE ORAL at 22:02

## 2023-07-30 RX ADMIN — AMLODIPINE BESYLATE 5 MILLIGRAM(S): 2.5 TABLET ORAL at 05:47

## 2023-07-30 NOTE — CHART NOTE - NSCHARTNOTEFT_GEN_A_CORE
RN notified provider 02:50 7/30 regarding change in post LP full body muscle spasms and tremors. Informed that patient has been endorsing chest tightness and worsening of the muscle spasms which now involve the mouth, RN notes which is new since the morning.   Evaluated patient at bedside. Chest tightness is non-radiating, reproducible and not accompanied by SOB, n/v, diaphoresis, fatigue, palpitations. EKG obtained at bedside NSR, not concerning for ischemic changes. AM trops and BNP obtained.   Patient is currently taking baclofen 15mg q8, pregabalin 200mg PO qd, duloxetine 30mg qhs which she states has not provided much relief.   Will trial cyclobenzaprine 10mg x1.   Held discussion with patient regarding progression of workup, patient states that she is amenable towards a behavioral health intervention. RN notified provider 02:50 7/30 regarding change in post LP full body muscle spasms and tremors. Informed that patient has been endorsing chest tightness and worsening of the muscle spasms which now involve the mouth, RN notes which is new since the morning.   Evaluated patient at bedside. Witnessed intermittent contraction and extension of lower extremities 1-2 episodes per minute. Otherwise no observed trismus or facial involvement, patient states her symptoms involving facial muscles has improved since prior but that her extremity spasms have worsened. Endorses cramping during episodes.   Chest tightness is non-radiating, reproducible with palpation over sternum and not accompanied by dyspnea, n/v, diaphoresis, fatigue, palpitations. Patient states that the chest tightness feels more like muscle pain and only occurs during spasms.   EKG obtained at bedside NSR, not concerning for ischemic changes. AM trops and BNP obtained.   Patient is currently taking baclofen 15mg q8 (increased from 10mg 18h 7/29), pregabalin 200mg PO qd, duloxetine 30mg qhs which she states has not provided much relief.   Will trial cyclobenzaprine 10mg x1 this evening.   Held discussion with patient regarding progression of workup, patient states that she is amenable towards a behavioral health intervention.

## 2023-07-30 NOTE — PROGRESS NOTE ADULT - SUBJECTIVE AND OBJECTIVE BOX
She is still c/o intermittent episodes of cramps - can be in the legs and can also involve head and face.  Painful and disturbing since we do not know the cause. She is awake and aware during the episodes.   Increased baclofen dose from 10 to 15 mg TID has not helped.  It is unclear if pregabalin or duloxetine have helped her nerve type pain.  No side effects on pregabalin and baclofen.    Vital Signs Last 24 Hrs  T(C): 36.4 (30 Jul 2023 12:46), Max: 36.9 (29 Jul 2023 21:05)  T(F): 97.6 (30 Jul 2023 12:46), Max: 98.4 (29 Jul 2023 21:05)  HR: 81 (30 Jul 2023 12:46) (78 - 93)  BP: 112/69 (30 Jul 2023 12:46) (112/69 - 132/95)  BP(mean): --  RR: 18 (30 Jul 2023 12:46) (18 - 18)  SpO2: 96% (30 Jul 2023 12:46) (95% - 96%)    Parameters below as of 30 Jul 2023 05:30  Patient On (Oxygen Delivery Method): room air    Exam:  Several episodes of cramps in the leg with flexion in the hips and knees.  Motor:  Limited by pain and effort - at least 4+-->5/5 throughout.    Areflexic.  Normal tone when not having cramps.

## 2023-07-30 NOTE — PROGRESS NOTE ADULT - ATTENDING COMMENTS
43F with PMH of sickle cell trait, asthma, peripheral neuropathy who presents s/p fall with ongoing weakness/paresthesia. Neuro on board. Further workup in progress to eval for other neuro culprits. MRI H/spine showed no acute pathologies. LP done. Available results not indicative of any pathologies. Additional serologies in progress. Hospital course notable for questionable seizure-like activities - EEG showed no evidence of seizures. Remains hospitalized pending further workup.     Pt seen at bedside. Overnight events noted. Pt reports episodic spasms, ongoing pain/neuropathic pain/paresthesia - w/ increase frequency and severity and bodily involvement since LP. Exam otherwise unchanged. Labs unremarkable - trop neg. PRL was high 29.    #LE weakness, paresthesia    -Neuro recs appreciated – PRN pain, baclofen, lyrica. Cont duloxetine..  -CT Chest. ECHO. Fat Pad bx. R/o amyloidosis, paraneoplastic process.   -Pt in agreement w/ BH evaluation - reasonable to consider conversion/somatic disorder.    Rest of plan as above. DC planning to ACUTE rehab once evaluation completes.

## 2023-07-30 NOTE — PROGRESS NOTE ADULT - PROBLEM SELECTOR PLAN 1
Ascending weakness and paresthesias, beginning in 3/2023  Possible CIDP vs Guillan [place] vs transmyelitis   EMG: No electrophysiologic evidence of a motor polyradiculoneuropathy - findings are not consistent with a diagnosis of CIDP  MRI head/cervical/thoracic: Mid cervical degenerative disc disease, C4-C5 broad irregular right central and C5-C6 focal right foraminal disc protrusion (disc herniation) that cause ventral cord deformity. Not consistent with clinical presentation per neurosurgery    Plan:  - LP under sedation performed 7/27; CSF studies have been unremarkable so far  - MRI under sedation completed 7/26/23  - Also started on baclofen 10mg and motrin 600mg and duloxetine 30mg for additional pain control  - Neurology following; appreciate recs  -EEG normal  - f/u transthyretin, CT chest, TTE, fatpad aspiration  -Patient amenable to  evaluation

## 2023-07-30 NOTE — PROGRESS NOTE ADULT - ASSESSMENT
Ms. Perez is a 42 yo woman with 8 months of progressively worsening painful paresthesias and areflexia.  There is no etiology found for her weakness - either in the PNS or CNS - there are normal motor nerve conduction studies and there is no lesion in the brain or spinal cord to explain the weakness - there is a limited motor exam due to pain (with full effort, she is 4+ --> 5/5 throughout).  The character of her sensory symptoms and areflexia are more consistent with a large and small fiber sensory polyneuropathy but due to artifacts it is not possible to confirm or exclude a large fiber sensory polyneuropathy with the EMG/NCS.  A small fiber polyneuropathy can never be assessed with NCSs.  An outpatient NCS is needed to assess for a large fiber sensory polyneuropathy.  Pattern of sensory loss and changes over time are unusual.     For neuropathic pain - continue pregabalin 200 mg BID and duloxetine 30 mg daily with dinner.  Can increase to 60 mg daily if she tolerates it after 2 more days.  We increased baclofen for cramps from 10 to 15 mg TID.    We will continue our extensive work up for etiologies for a possible small and large fiber sensory polyneuropathy - work up as our note on 7/28/23.     New onset of abnormal cramps and movements with preserved consciousness of uncertain etiology - EEG consistent with psychogenic nonepileptic spells.  The differential diagnosis includes a movement disorder but I do not understand how an LP could cause this.  Due to this uncertainty, with the patient's permission, I will send a video of the episode to a movement disorders specialist to help with a diagnosis.     D/W patient and her father.   Thank you.      Ms. Perez is a 44 yo woman with 8 months of progressively worsening painful paresthesias and areflexia.  There is no etiology found for her weakness - either in the PNS or CNS - there are normal motor nerve conduction studies and there is no lesion in the brain or spinal cord to explain the weakness - there is a limited motor exam due to pain (with full effort, she is 4+ --> 5/5 throughout).  The character of her sensory symptoms and areflexia are more consistent with a large and small fiber sensory polyneuropathy but due to artifacts it is not possible to confirm or exclude a large fiber sensory polyneuropathy with the EMG/NCS.  A small fiber polyneuropathy can never be assessed with NCSs.  An outpatient NCS is needed to assess for a large fiber sensory polyneuropathy.  Pattern of sensory loss and changes over time are unusual.     For neuropathic pain - continue pregabalin 200 mg BID and duloxetine 30 mg daily with dinner.  Can increase to 60 mg daily if she tolerates it after 2 more days.  We increased baclofen for cramps from 10 to 15 mg TID - she does not want to increase the dose at this time.     We will continue our extensive work up for etiologies for a possible small and large fiber sensory polyneuropathy - work up as our note on 7/28/23.     New onset of abnormal cramps and movements with preserved consciousness of uncertain etiology - EEG consistent with psychogenic nonepileptic spells.  The differential diagnosis includes a movement disorder but I do not understand how an LP could cause this.  Due to this uncertainty, with the patient's permission, I will send a video of the episode to a movement disorders specialist to help with a diagnosis.     D/W patient and her father.   Thank you.

## 2023-07-30 NOTE — PROGRESS NOTE ADULT - SUBJECTIVE AND OBJECTIVE BOX
INTERVAL: ovn had full body tremors and was seen by NF, no interventions needed.  SUBJECTIVE: Patient examined bedside this AM. States that spasms are worse since getting the LP. believes something is very wrong with her body. Is open to seeing BH. No other concerns    OBJECTIVE:  ICU Vital Signs Last 24 Hrs  T(C): 36.9 (30 Jul 2023 05:30), Max: 36.9 (29 Jul 2023 21:05)  T(F): 98.4 (30 Jul 2023 05:30), Max: 98.4 (29 Jul 2023 21:05)  HR: 78 (30 Jul 2023 05:30) (78 - 93)  BP: 119/73 (30 Jul 2023 05:30) (110/81 - 132/95)  BP(mean): --  ABP: --  ABP(mean): --  RR: 18 (30 Jul 2023 05:30) (18 - 18)  SpO2: 95% (30 Jul 2023 05:30) (95% - 97%)    O2 Parameters below as of 30 Jul 2023 05:30  Patient On (Oxygen Delivery Method): room air              CAPILLARY BLOOD GLUCOSE          PHYSICAL EXAM:  General: Well-groomed, NAD, laying in bed, on RA  HEENT: PERRLA, EOMI, non-icteric  Neck:  symmetric,  JVD absent  Respiratory: Clear to ascultation bilaterally, no crackles/rales, no Resp distress; no accessory muscle use  Cardiovascular:  RRR, no murmurs/rubs/gallops  Abdomen: Soft, NT, ND  Extremities: No edema noted  Skin: No rashes or lesions noted  Neurological: Sensation grossly intact; strength 5/5 in all extremities.  Psychiatry: AOx3, appropriate insight/judgement, appropriate affect, recent/remote memory intact    PRN Meds:  baclofen 15 milliGRAM(s) Oral every 8 hours PRN  diphenhydrAMINE 50 milliGRAM(s) Oral once PRN  diphenhydrAMINE Injectable 25 milliGRAM(s) IV Push every 6 hours PRN  LORazepam   Injectable 2 milliGRAM(s) IV Push once PRN      LABS:    Hgb Trend: 11.3<--, 11.2<--, 11.8<--        Creatinine Trend: 0.55<--, 0.50<--, 0.56<--, 0.59<--, 0.51<--, 0.53<--            MICROBIOLOGY:       RADIOLOGY:  [ ] Reviewed and interpreted by me    EKG:

## 2023-07-31 DIAGNOSIS — Z01.818 ENCOUNTER FOR OTHER PREPROCEDURAL EXAMINATION: ICD-10-CM

## 2023-07-31 LAB
ALBUMIN SERPL ELPH-MCNC: 3.6 G/DL — SIGNIFICANT CHANGE UP (ref 3.3–5)
ALP SERPL-CCNC: 66 U/L — SIGNIFICANT CHANGE UP (ref 40–120)
ALT FLD-CCNC: 49 U/L — HIGH (ref 4–33)
ANION GAP SERPL CALC-SCNC: 9 MMOL/L — SIGNIFICANT CHANGE UP (ref 7–14)
AST SERPL-CCNC: 109 U/L — HIGH (ref 4–32)
BILIRUB SERPL-MCNC: 0.5 MG/DL — SIGNIFICANT CHANGE UP (ref 0.2–1.2)
BUN SERPL-MCNC: 13 MG/DL — SIGNIFICANT CHANGE UP (ref 7–23)
CALCIUM SERPL-MCNC: 9.5 MG/DL — SIGNIFICANT CHANGE UP (ref 8.4–10.5)
CHLORIDE SERPL-SCNC: 100 MMOL/L — SIGNIFICANT CHANGE UP (ref 98–107)
CO2 SERPL-SCNC: 28 MMOL/L — SIGNIFICANT CHANGE UP (ref 22–31)
CREAT SERPL-MCNC: 0.48 MG/DL — LOW (ref 0.5–1.3)
EGFR: 120 ML/MIN/1.73M2 — SIGNIFICANT CHANGE UP
GLUCOSE SERPL-MCNC: 86 MG/DL — SIGNIFICANT CHANGE UP (ref 70–99)
HCT VFR BLD CALC: 34.8 % — SIGNIFICANT CHANGE UP (ref 34.5–45)
HGB BLD-MCNC: 11.2 G/DL — LOW (ref 11.5–15.5)
MAGNESIUM SERPL-MCNC: 1.7 MG/DL — SIGNIFICANT CHANGE UP (ref 1.6–2.6)
MBP CSF-MCNC: 6.8 NG/ML — HIGH (ref 0–3.7)
MCHC RBC-ENTMCNC: 29 PG — SIGNIFICANT CHANGE UP (ref 27–34)
MCHC RBC-ENTMCNC: 32.2 GM/DL — SIGNIFICANT CHANGE UP (ref 32–36)
MCV RBC AUTO: 90.2 FL — SIGNIFICANT CHANGE UP (ref 80–100)
NRBC # BLD: 0 /100 WBCS — SIGNIFICANT CHANGE UP (ref 0–0)
NRBC # FLD: 0 K/UL — SIGNIFICANT CHANGE UP (ref 0–0)
PHOSPHATE SERPL-MCNC: 4.4 MG/DL — SIGNIFICANT CHANGE UP (ref 2.5–4.5)
PLATELET # BLD AUTO: 268 K/UL — SIGNIFICANT CHANGE UP (ref 150–400)
POTASSIUM SERPL-MCNC: 3.6 MMOL/L — SIGNIFICANT CHANGE UP (ref 3.5–5.3)
POTASSIUM SERPL-SCNC: 3.6 MMOL/L — SIGNIFICANT CHANGE UP (ref 3.5–5.3)
PROT SERPL-MCNC: 7.3 G/DL — SIGNIFICANT CHANGE UP (ref 6–8.3)
RBC # BLD: 3.86 M/UL — SIGNIFICANT CHANGE UP (ref 3.8–5.2)
RBC # FLD: 13.7 % — SIGNIFICANT CHANGE UP (ref 10.3–14.5)
SODIUM SERPL-SCNC: 137 MMOL/L — SIGNIFICANT CHANGE UP (ref 135–145)
WBC # BLD: 4.88 K/UL — SIGNIFICANT CHANGE UP (ref 3.8–10.5)
WBC # FLD AUTO: 4.88 K/UL — SIGNIFICANT CHANGE UP (ref 3.8–10.5)

## 2023-07-31 PROCEDURE — 71250 CT THORAX DX C-: CPT | Mod: 26

## 2023-07-31 PROCEDURE — 90792 PSYCH DIAG EVAL W/MED SRVCS: CPT

## 2023-07-31 PROCEDURE — 99233 SBSQ HOSP IP/OBS HIGH 50: CPT | Mod: GC

## 2023-07-31 PROCEDURE — 93306 TTE W/DOPPLER COMPLETE: CPT | Mod: 26

## 2023-07-31 RX ORDER — SODIUM,POTASSIUM PHOSPHATES 278-250MG
1 POWDER IN PACKET (EA) ORAL ONCE
Refills: 0 | Status: DISCONTINUED | OUTPATIENT
Start: 2023-07-31 | End: 2023-07-31

## 2023-07-31 RX ORDER — METHOCARBAMOL 500 MG/1
750 TABLET, FILM COATED ORAL THREE TIMES A DAY
Refills: 0 | Status: DISCONTINUED | OUTPATIENT
Start: 2023-07-31 | End: 2023-08-25

## 2023-07-31 RX ORDER — SODIUM CHLORIDE 9 MG/ML
1000 INJECTION, SOLUTION INTRAVENOUS
Refills: 0 | Status: DISCONTINUED | OUTPATIENT
Start: 2023-08-01 | End: 2023-08-01

## 2023-07-31 RX ORDER — MAGNESIUM SULFATE 500 MG/ML
2 VIAL (ML) INJECTION ONCE
Refills: 0 | Status: COMPLETED | OUTPATIENT
Start: 2023-07-31 | End: 2023-07-31

## 2023-07-31 RX ORDER — POTASSIUM CHLORIDE 20 MEQ
40 PACKET (EA) ORAL ONCE
Refills: 0 | Status: COMPLETED | OUTPATIENT
Start: 2023-07-31 | End: 2023-07-31

## 2023-07-31 RX ADMIN — Medication 25 GRAM(S): at 12:27

## 2023-07-31 RX ADMIN — Medication 1 APPLICATION(S): at 05:21

## 2023-07-31 RX ADMIN — Medication 1 APPLICATION(S): at 17:59

## 2023-07-31 RX ADMIN — DULOXETINE HYDROCHLORIDE 30 MILLIGRAM(S): 30 CAPSULE, DELAYED RELEASE ORAL at 22:12

## 2023-07-31 RX ADMIN — Medication 200 MILLIGRAM(S): at 17:59

## 2023-07-31 RX ADMIN — METHOCARBAMOL 750 MILLIGRAM(S): 500 TABLET, FILM COATED ORAL at 22:12

## 2023-07-31 RX ADMIN — AMLODIPINE BESYLATE 5 MILLIGRAM(S): 2.5 TABLET ORAL at 05:21

## 2023-07-31 RX ADMIN — Medication 40 MILLIEQUIVALENT(S): at 12:27

## 2023-07-31 RX ADMIN — Medication 200 MILLIGRAM(S): at 05:38

## 2023-07-31 NOTE — PROGRESS NOTE ADULT - SUBJECTIVE AND OBJECTIVE BOX
PROGRESS NOTE:   Authored by Dr. Bharat Venegas MD (PGY-1).     Patient is a 43y old  Female who presents with a chief complaint of Weakness in lower and upper extremities (30 Jul 2023 16:33)      SUBJECTIVE / OVERNIGHT EVENTS:  No acute events overnight. She has no acute complaints this morning.     MEDICATIONS  (STANDING):  amLODIPine   Tablet 5 milliGRAM(s) Oral daily  artificial  tears Solution 1 Drop(s) Both EYES four times a day  DULoxetine 30 milliGRAM(s) Oral at bedtime  enoxaparin Injectable 40 milliGRAM(s) SubCutaneous every 12 hours  lidocaine   4% Patch 1 Patch Transdermal daily  magnesium sulfate  IVPB 2 Gram(s) IV Intermittent once  nystatin/triamcinolone Ointment 1 Application(s) Topical two times a day  polyethylene glycol 3350 17 Gram(s) Oral daily  potassium chloride   Powder 40 milliEquivalent(s) Oral once  pregabalin 200 milliGRAM(s) Oral two times a day    MEDICATIONS  (PRN):  baclofen 15 milliGRAM(s) Oral every 8 hours PRN Musculoskeletal Pain  diphenhydrAMINE 50 milliGRAM(s) Oral once PRN Anxiety  diphenhydrAMINE Injectable 25 milliGRAM(s) IV Push every 6 hours PRN Rash and/or Itching  LORazepam   Injectable 2 milliGRAM(s) IV Push once PRN Anxiety      CAPILLARY BLOOD GLUCOSE        I&O's Summary      PHYSICAL EXAM:  Vital Signs Last 24 Hrs  T(C): 36.8 (31 Jul 2023 05:00), Max: 36.8 (30 Jul 2023 21:37)  T(F): 98.2 (31 Jul 2023 05:00), Max: 98.2 (30 Jul 2023 21:37)  HR: 73 (31 Jul 2023 05:00) (73 - 94)  BP: 107/67 (31 Jul 2023 05:00) (106/71 - 112/69)  BP(mean): --  RR: 18 (31 Jul 2023 05:00) (18 - 18)  SpO2: 97% (31 Jul 2023 05:00) (94% - 97%)    Parameters below as of 31 Jul 2023 05:00  Patient On (Oxygen Delivery Method): room air        CONSTITUTIONAL: NAD, well-developed  HEET: MMM, EOMI, PERRLA  NECK: supple  RESPIRATORY: Normal respiratory effort; lungs are clear to auscultation bilaterally  CARDIOVASCULAR: Regular rate and rhythm, normal S1 and S2, no murmur/rub/gallop; No lower extremity edema; Peripheral pulses are 2+ bilaterally  ABDOMEN: Nontender to palpation, normoactive bowel sounds, no rebound/guarding; No hepatosplenomegaly  MUSCULOSKELETAL: 5/5 strength in extremities b/l and diminished sensation in lower extremities b/l  NEURO: Moving all four extremities, sensation grossly intact  PSYCH: A+O to person, place, and time; affect appropriate  SKIN: No rash    LABS:                        11.2   4.88  )-----------( 268      ( 31 Jul 2023 07:10 )             34.8     07-31    137  |  100  |  13  ----------------------------<  86  3.6   |  28  |  0.48<L>    Ca    9.5      31 Jul 2023 07:10  Phos  4.4     07-31  Mg     1.70     07-31    TPro  7.3  /  Alb  3.6  /  TBili  0.5  /  DBili  x   /  AST  109<H>  /  ALT  49<H>  /  AlkPhos  66  07-31          Urinalysis Basic - ( 31 Jul 2023 07:10 )    Color: x / Appearance: x / SG: x / pH: x  Gluc: 86 mg/dL / Ketone: x  / Bili: x / Urobili: x   Blood: x / Protein: x / Nitrite: x   Leuk Esterase: x / RBC: x / WBC x   Sq Epi: x / Non Sq Epi: x / Bacteria: x          Tele Reviewed:    RADIOLOGY & ADDITIONAL TESTS:  Results Reviewed:   Imaging Personally Reviewed:  Electrocardiogram Personally Reviewed:

## 2023-07-31 NOTE — BH CONSULTATION LIAISON ASSESSMENT NOTE - CURRENT MEDICATION
MEDICATIONS  (STANDING):  amLODIPine   Tablet 5 milliGRAM(s) Oral daily  artificial  tears Solution 1 Drop(s) Both EYES four times a day  DULoxetine 30 milliGRAM(s) Oral at bedtime  lidocaine   4% Patch 1 Patch Transdermal daily  nystatin/triamcinolone Ointment 1 Application(s) Topical two times a day  polyethylene glycol 3350 17 Gram(s) Oral daily  pregabalin 200 milliGRAM(s) Oral two times a day    MEDICATIONS  (PRN):  baclofen 15 milliGRAM(s) Oral every 8 hours PRN Musculoskeletal Pain  diphenhydrAMINE 50 milliGRAM(s) Oral once PRN Anxiety  diphenhydrAMINE Injectable 25 milliGRAM(s) IV Push every 6 hours PRN Rash and/or Itching  LORazepam   Injectable 2 milliGRAM(s) IV Push once PRN Anxiety

## 2023-07-31 NOTE — BH CONSULTATION LIAISON ASSESSMENT NOTE - HPI (INCLUDE ILLNESS QUALITY, SEVERITY, DURATION, TIMING, CONTEXT, MODIFYING FACTORS, ASSOCIATED SIGNS AND SYMPTOMS)
40 yr old female with PPH of depression and PMH significant for peripheral neuropathy, who presented with LE,UE weakness. Patient has had extensive workup including LP. After LP was having abnormal limb movements. Neurology has been following.  Psych consulted for concern for PNES and conversion disorder.    Patient seen this afternoon. She is calm and pleasant. She is upset that people feel that this must be in her head and that psych was consulted. She admits to stressors but feels that her stressors are no different than stressors in the past and that she has never experienced this. Attempted to explain concept of PNES and conversion disorder- she does not feel that it applies to her. She denies SI/HI. Denies mood symptoms. Denies substance abuse.  Main issue is her pain, weakness and abnormal movements.
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
## 2023-07-31 NOTE — PRE PROCEDURE NOTE - ATTENDING COMMENTS
I have personally interviewed and examined this patient, reviewed pertinent labs and imaging, and discussed the case with colleagues, residents, and physician assistants on B Team rounds.  More than 50% of this 30 minute encounter including face to face with the patient was spent counseling and/or coordination of care on r/o amyloid.  Time included review of vitals, labs, imaging, discussion with consultants and coordination with the operating room/emergency department.    42yo F with peripheral neuropathy, presenting with weakness. Planned for bedside abdominal fat pad biopsy to r/o amyloidosis, however pt declines to have procedure performed at bedside due to concerns about anxiety and pain.     - Plan for bx in OR tomorrow   - NPO p mn     The active care issues are:  1. weakness     The Acute Care Surgery (B Team) Attending Group Practice:  Dr. Sophia Damico    urgent issues - spectra [id number]  nonurgent issues - [phone]  patient appointments or afterhours - [phone]

## 2023-07-31 NOTE — PROGRESS NOTE ADULT - PROBLEM SELECTOR PLAN 3
- Hypoechoic liver lesions and hepatic steatosis on RUQ US  - Hepatitis panel, HIV (-)  - CT A/P consistent with hepatic steatosis  - AST/ALT (109/49) 7/31/23  - ensure patient is not on any hepatotoxic meds Ophthalmology evaluated pt and noted dry eyes    -continue artificial tears 4-5x daily

## 2023-07-31 NOTE — BH CONSULTATION LIAISON ASSESSMENT NOTE - NSBHCHARTREVIEWVS_PSY_A_CORE FT
Vital Signs Last 24 Hrs  T(C): 36.4 (31 Jul 2023 12:26), Max: 36.8 (30 Jul 2023 21:37)  T(F): 97.6 (31 Jul 2023 12:26), Max: 98.2 (30 Jul 2023 21:37)  HR: 79 (31 Jul 2023 12:26) (73 - 94)  BP: 120/88 (31 Jul 2023 12:26) (106/71 - 120/88)  BP(mean): --  RR: 17 (31 Jul 2023 12:26) (17 - 18)  SpO2: 96% (31 Jul 2023 12:26) (94% - 97%)    Parameters below as of 31 Jul 2023 12:26  Patient On (Oxygen Delivery Method): room air

## 2023-07-31 NOTE — PROGRESS NOTE ADULT - PROBLEM SELECTOR PLAN 1
Ascending weakness and paresthesias, beginning in 3/2023  Possible CIDP vs Guillan [place] vs transmyelitis   EMG: No electrophysiologic evidence of a motor polyradiculoneuropathy - findings are not consistent with a diagnosis of CIDP  MRI head/cervical/thoracic: Mid cervical degenerative disc disease, C4-C5 broad irregular right central and C5-C6 focal right foraminal disc protrusion (disc herniation) that cause ventral cord deformity. Not consistent with clinical presentation per neurosurgery    Plan:  - LP under sedation performed 7/27; CSF studies have been unremarkable so far  - MRI under sedation completed 7/26/23- unremarkable findings   - Also started on baclofen 10mg and motrin 600mg and duloxetine 30mg for additional pain control  - Neurology following; appreciate recs  -EEG normal  - f/u transthyretin, CT chest, TTE, fatpad aspiration  -Appreciate psych recs -Scheduled for fat pad biopsy tomorrow in OR  - pt medically optimized for procedure  - no contraindications to procedure as patient is able to achieve >4 METS of activity (can walk >2 flights of stairs)  - Her RCRI score is 0, which corresponds to a 3.9% risk of death, cardiac arrest, and MI in 30 days

## 2023-07-31 NOTE — BH CONSULTATION LIAISON ASSESSMENT NOTE - MSE UNSTRUCTURED FT
Mental Status Exam:  Eric: well groomed, fair hygiene     Behavior: calm, cooperative, no psychomotor retardation/agitation  Motor: no tremors, EPS, or rigidity  Gait: did not assess, pt in bed  Speech: normal rate, rhythm, prosody and volume   Mood: "okay"  Affect: euthymic, congruent  Thought process: clear, goal directed   Thought Content: denies paranoia, delusions   Perception: denies AH/VH  SI: denies  HI: denies  Insight: fair  Judgment: fair    Cognitive Exam:  Orientation: AOx3  Recall: intact  Attention: intact  Abstraction: intact   Mental Status Exam:  Eric: well groomed, fair hygiene     Behavior: calm, cooperative, no psychomotor retardation/agitation  Motor: no tremors, EPS, or rigidity  Gait: did not assess, pt in bed  Speech: normal rate, rhythm, prosody and volume   Mood: "okay"  Affect: euthymic, congruent  Thought process: clear, goal directed   Thought Content: denies paranoia, delusions; talks about neurological issues  Perception: denies AH/VH  SI: denies  HI: denies  Insight: fair  Judgment: fair    Cognitive Exam:  Orientation: AOx3  Recall: intact  Attention: intact  Abstraction: intact

## 2023-07-31 NOTE — BH CONSULTATION LIAISON ASSESSMENT NOTE - NSBHCONSULTFOLLOWAFTERCARE_PSY_A_CORE FT
[facility]  [address] [place] [id number] [phone]
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
## 2023-07-31 NOTE — PRE PROCEDURE NOTE - PRE PROCEDURE EVALUATION
General Surgery Preop Note    Patient is a 43y old  Female who presents with a chief complaint of Weakness in lower and upper extremities (31 Jul 2023 10:36)    Diagnosis: R/o amyloidosis  Procedure: Abdominal Fat Pad Biopsy  Surgeon: Syed                          [id number]   4.88  )-----------( 268      ( 31 Jul 2023 07:10 )             34.8     07-31    137  |  100  |  13  ----------------------------<  86  3.6   |  28  |  0.48<L>    Ca    9.5      31 Jul 2023 07:10  Phos  4.4     07-31  Mg     1.70     07-31    TPro  7.3  /  Alb  3.6  /  TBili  0.5  /  DBili  x   /  AST  109<H>  /  ALT  49<H>  /  AlkPhos  66  07-31        Urinalysis Basic - ( 31 Jul 2023 07:10 )    Color: x / Appearance: x / SG: x / pH: x  Gluc: 86 mg/dL / Ketone: x  / Bili: x / Urobili: x   Blood: x / Protein: x / Nitrite: x   Leuk Esterase: x / RBC: x / WBC x   Sq Epi: x / Non Sq Epi: x / Bacteria: x         MEDICATIONS  (STANDING):  amLODIPine   Tablet 5 milliGRAM(s) Oral daily  artificial  tears Solution 1 Drop(s) Both EYES four times a day  DULoxetine 30 milliGRAM(s) Oral at bedtime  lidocaine   4% Patch 1 Patch Transdermal daily  nystatin/triamcinolone Ointment 1 Application(s) Topical two times a day  polyethylene glycol 3350 17 Gram(s) Oral daily  pregabalin 200 milliGRAM(s) Oral two times a day    MEDICATIONS  (PRN):  baclofen 15 milliGRAM(s) Oral every 8 hours PRN Musculoskeletal Pain  diphenhydrAMINE 50 milliGRAM(s) Oral once PRN Anxiety  diphenhydrAMINE Injectable 25 milliGRAM(s) IV Push every 6 hours PRN Rash and/or Itching  LORazepam   Injectable 2 milliGRAM(s) IV Push once PRN Anxiety      Assessment & Plan:  43 year old F w/ a PMHx of of peripheral neuropathy (diagnosed at OSH in 3/2023), sickle cell trait, asthma, and anemia who presented to the ED by EMS after falling at home. Patient is planned for abdominal fat pad biopsy to r/o amyloidosis tomorrow.     [x] NPO after midnight  [x] IVF  [x] CXR -- done   [x] EKG -- done   [x] T&S -- 1 in system, 1 ordered AM  [x] AM CBC -- ordered  [x] AM BMP -- ordered  [x] AM PT, PTT, INR -- ordered  [ ] Pregnancy test - PENDING  [ ] Documentation of medical optimization -- IM note - PENDING  [x] Consent signed and placed in chart      B Team Surgery (Acute Care Surgery)  [id number]   General Surgery Preop Note    Patient is a 43y old  Female who presents with a chief complaint of Weakness in lower and upper extremities (31 Jul 2023 10:36)    Diagnosis: R/o amyloidosis  Procedure: Abdominal Fat Pad Biopsy  Surgeon: Syed                          11Christo2   4.88  )-----------( 268      ( 31 Jul 2023 07:10 )             34.8     07-31    137  |  100  |  13  ----------------------------<  86  3.6   |  28  |  0.48<L>    Ca    9.5      31 Jul 2023 07:10  Phos  4.4     07-31  Mg     1.70     07-31    TPro  7.3  /  Alb  3.6  /  TBili  0.5  /  DBili  x   /  AST  109<H>  /  ALT  49<H>  /  AlkPhos  66  07-31        Urinalysis Basic - ( 31 Jul 2023 07:10 )    Color: x / Appearance: x / SG: x / pH: x  Gluc: 86 mg/dL / Ketone: x  / Bili: x / Urobili: x   Blood: x / Protein: x / Nitrite: x   Leuk Esterase: x / RBC: x / WBC x   Sq Epi: x / Non Sq Epi: x / Bacteria: x         MEDICATIONS  (STANDING):  amLODIPine   Tablet 5 milliGRAM(s) Oral daily  artificial  tears Solution 1 Drop(s) Both EYES four times a day  DULoxetine 30 milliGRAM(s) Oral at bedtime  lidocaine   4% Patch 1 Patch Transdermal daily  nystatin/triamcinolone Ointment 1 Application(s) Topical two times a day  polyethylene glycol 3350 17 Gram(s) Oral daily  pregabalin 200 milliGRAM(s) Oral two times a day    MEDICATIONS  (PRN):  baclofen 15 milliGRAM(s) Oral every 8 hours PRN Musculoskeletal Pain  diphenhydrAMINE 50 milliGRAM(s) Oral once PRN Anxiety  diphenhydrAMINE Injectable 25 milliGRAM(s) IV Push every 6 hours PRN Rash and/or Itching  LORazepam   Injectable 2 milliGRAM(s) IV Push once PRN Anxiety      Assessment & Plan:  43 year old F w/ a PMHx of of peripheral neuropathy (diagnosed at OSH in 3/2023), sickle cell trait, asthma, and anemia who presented to the ED by EMS after falling at home. Patient is planned for abdominal fat pad biopsy to r/o amyloidosis tomorrow.     [x] NPO after midnight  [x] IVF  [x] CXR -- done   [x] EKG -- done   [x] T&S -- 1 in system, 1 ordered AM  [x] AM CBC -- ordered  [x] AM BMP -- ordered  [x] AM PT, PTT, INR -- ordered  [x] Lovenox held for procedure  [ ] Pregnancy test - PENDING  [ ] Documentation of medical optimization -- IM note - PENDING  [x] Consent signed and placed in chart      B Team Surgery (Acute Care Surgery)  [id number]

## 2023-07-31 NOTE — PROGRESS NOTE ADULT - PROBLEM SELECTOR PLAN 5
No hemorrhage on CTH  No bruising  Left knee pain    Plan:  -Tylenol prn Unlikely ACS, EKG NSR, trop (-)  Endorses mid epigastric burning pain  Also MSK in nature as pt has been using her upper body to compensate for LE weakness  Improved with rest since admission

## 2023-07-31 NOTE — PROGRESS NOTE ADULT - PROBLEM SELECTOR PLAN 4
Unlikely ACS, EKG NSR, trop (-)  Endorses mid epigastric burning pain  Also MSK in nature as pt has been using her upper body to compensate for LE weakness  Improved with rest since admission - Hypoechoic liver lesions and hepatic steatosis on RUQ US  - Hepatitis panel, HIV (-)  - CT A/P consistent with hepatic steatosis  - AST/ALT (109/49) 7/31/23  - ensure patient is not on any hepatotoxic meds

## 2023-07-31 NOTE — PROGRESS NOTE ADULT - ATTENDING COMMENTS
43F with PMH of sickle cell trait, asthma, peripheral neuropathy who presents s/p fall with ongoing weakness/paresthesia. Neuro on board. Further workup in progress to eval for other neuro culprits. MRI H/spine showed no acute pathologies. LP done. Available results not indicative of any pathologies. Additional serologies in progress. Hospital course notable for questionable seizure-like activities - EEG showed no evidence of seizures. CT chest w/o contrast showed no acute pulmonary process. ECHO also unrevealing.  consult is pending. Remains hospitalized pending further workup.     Pt seen at bedside. No new issues. No improvement w/ sensory changes/spasms/pain. Workup remains unrevealing. Labs w/ elevated of LFTs - otherwise stable.     #LE weakness, paresthesia  #Transaminitis - prob fatty liver    -Neuro recs appreciated – PRN pain, baclofen, lyrica. Cont duloxetine..  -Fat pad bx pending. F/u surgery.  -Pt in agreement w/  evaluation - reasonable to consider conversion/somatic disorder.  -LFT monitor - consider medication, although at baseline she has transaminase elevation and she is within her usual range.    Rest of plan as above. DC planning to ACUTE rehab once evaluation completes. 43F with PMH of sickle cell trait, asthma, peripheral neuropathy who presents s/p fall with ongoing weakness/paresthesia. Neuro on board. Further workup in progress to eval for other neuro culprits. MRI H/spine showed no acute pathologies. LP done. Available results not indicative of any pathologies. Additional serologies in progress. Hospital course notable for questionable seizure-like activities - EEG showed no evidence of seizures. CT chest w/o contrast showed no acute pulmonary process. ECHO also unrevealing.  consult is pending. Remains hospitalized pending further workup.     Pt seen at bedside. No new issues. No improvement w/ sensory changes/spasms/pain. Workup remains unrevealing. Labs w/ elevated of LFTs - otherwise stable.     #LE weakness, paresthesia  #Transaminitis - prob fatty liver    -Neuro recs appreciated – PRN pain, baclofen, lyrica. Cont duloxetine..  -Fat pad bx pending. From medicine standpoint - no further preop testing is needed for this patient. She should proceed w/ medically necessary procedure for further diagnostic. Her RCRI score is 0. A 3.9 % 30-day risk of death, MI, or cardiac arrest  - input noted.   -LFT monitor - consider medication, although at baseline she has transaminase elevation and she is within her usual range.    Rest of plan as above. DC planning to ACUTE rehab once evaluation completes.

## 2023-07-31 NOTE — BH CONSULTATION LIAISON ASSESSMENT NOTE - SUMMARY
Appreciate concern for PNES vs conversion disorder. Both of these diagnoses are diagnoses of exclusion. Will be imperative to complete workup. However, at this point, patient is not amenable to starting psychiatric medication, she feels that her main issue is weakness and these abnormal movements. If this is in fact PNES or conversion- best treated in outpatient setting.    Will sign off. Call with questions. 40 yr old female with PPH of depression and PMH significant for peripheral neuropathy, who presented with LE,UE weakness. Patient has had extensive workup including LP. After LP was having abnormal limb movements. Neurology has been following.  Psych consulted for concern for PNES and conversion disorder.    Appreciate concern for PNES vs conversion disorder. Both of these diagnoses are diagnoses of exclusion. Will be imperative to complete workup. However, at this point, patient is not amenable to starting psychiatric medication, she feels that her main issue is weakness and these abnormal movements. If this is in fact PNES or conversion- best treated in outpatient setting.    Will sign off. Call with questions.

## 2023-07-31 NOTE — PROGRESS NOTE ADULT - PROBLEM SELECTOR PLAN 2
Ophthalmology evaluated pt and noted dry eyes    -continue artificial tears 4-5x daily Ascending weakness and paresthesias, beginning in 3/2023  Possible CIDP vs Guillan Faulkner vs transmyelitis   EMG: No electrophysiologic evidence of a motor polyradiculoneuropathy - findings are not consistent with a diagnosis of CIDP  MRI head/cervical/thoracic: Mid cervical degenerative disc disease, C4-C5 broad irregular right central and C5-C6 focal right foraminal disc protrusion (disc herniation) that cause ventral cord deformity. Not consistent with clinical presentation per neurosurgery    Plan:  - LP under sedation performed 7/27; CSF studies have been unremarkable so far  - MRI under sedation completed 7/26/23- unremarkable findings   - Also started on baclofen 10mg and motrin 600mg and duloxetine 30mg for additional pain control  - Neurology following; appreciate recs  -EEG normal  - f/u transthyretin, CT chest, TTE, fatpad aspiration  -Appreciate psych recs
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
## 2023-08-01 LAB
ANION GAP SERPL CALC-SCNC: 13 MMOL/L — SIGNIFICANT CHANGE UP (ref 7–14)
APTT BLD: 30.6 SEC — SIGNIFICANT CHANGE UP (ref 24.5–35.6)
BLD GP AB SCN SERPL QL: NEGATIVE — SIGNIFICANT CHANGE UP
BUN SERPL-MCNC: 11 MG/DL — SIGNIFICANT CHANGE UP (ref 7–23)
CALCIUM SERPL-MCNC: 9.5 MG/DL — SIGNIFICANT CHANGE UP (ref 8.4–10.5)
CHLORIDE SERPL-SCNC: 101 MMOL/L — SIGNIFICANT CHANGE UP (ref 98–107)
CO2 SERPL-SCNC: 23 MMOL/L — SIGNIFICANT CHANGE UP (ref 22–31)
CREAT SERPL-MCNC: 0.44 MG/DL — LOW (ref 0.5–1.3)
EGFR: 123 ML/MIN/1.73M2 — SIGNIFICANT CHANGE UP
GLUCOSE SERPL-MCNC: 79 MG/DL — SIGNIFICANT CHANGE UP (ref 70–99)
HCG SERPL-ACNC: <1 MIU/ML — SIGNIFICANT CHANGE UP
HCT VFR BLD CALC: 35.7 % — SIGNIFICANT CHANGE UP (ref 34.5–45)
HGB BLD-MCNC: 11.7 G/DL — SIGNIFICANT CHANGE UP (ref 11.5–15.5)
INNER EAR 68KD AB FLD QL: <1.5 U/L — SIGNIFICANT CHANGE UP (ref 0–2.5)
INR BLD: 1.07 RATIO — SIGNIFICANT CHANGE UP (ref 0.85–1.18)
MAGNESIUM SERPL-MCNC: 1.9 MG/DL — SIGNIFICANT CHANGE UP (ref 1.6–2.6)
MCHC RBC-ENTMCNC: 29.2 PG — SIGNIFICANT CHANGE UP (ref 27–34)
MCHC RBC-ENTMCNC: 32.8 GM/DL — SIGNIFICANT CHANGE UP (ref 32–36)
MCV RBC AUTO: 89 FL — SIGNIFICANT CHANGE UP (ref 80–100)
NRBC # BLD: 0 /100 WBCS — SIGNIFICANT CHANGE UP (ref 0–0)
NRBC # FLD: 0 K/UL — SIGNIFICANT CHANGE UP (ref 0–0)
PHOSPHATE SERPL-MCNC: 4.2 MG/DL — SIGNIFICANT CHANGE UP (ref 2.5–4.5)
PLATELET # BLD AUTO: 280 K/UL — SIGNIFICANT CHANGE UP (ref 150–400)
POTASSIUM SERPL-MCNC: 3.7 MMOL/L — SIGNIFICANT CHANGE UP (ref 3.5–5.3)
POTASSIUM SERPL-SCNC: 3.7 MMOL/L — SIGNIFICANT CHANGE UP (ref 3.5–5.3)
PROTHROM AB SERPL-ACNC: 12.1 SEC — SIGNIFICANT CHANGE UP (ref 9.5–13)
RBC # BLD: 4.01 M/UL — SIGNIFICANT CHANGE UP (ref 3.8–5.2)
RBC # FLD: 13.4 % — SIGNIFICANT CHANGE UP (ref 10.3–14.5)
RH IG SCN BLD-IMP: POSITIVE — SIGNIFICANT CHANGE UP
SODIUM SERPL-SCNC: 137 MMOL/L — SIGNIFICANT CHANGE UP (ref 135–145)
TM INTERPRETATION: SIGNIFICANT CHANGE UP
WBC # BLD: 5.12 K/UL — SIGNIFICANT CHANGE UP (ref 3.8–10.5)
WBC # FLD AUTO: 5.12 K/UL — SIGNIFICANT CHANGE UP (ref 3.8–10.5)

## 2023-08-01 PROCEDURE — 99233 SBSQ HOSP IP/OBS HIGH 50: CPT | Mod: GC

## 2023-08-01 PROCEDURE — 88302 TISSUE EXAM BY PATHOLOGIST: CPT | Mod: 26

## 2023-08-01 PROCEDURE — 88313 SPECIAL STAINS GROUP 2: CPT | Mod: 26

## 2023-08-01 PROCEDURE — 11106 INCAL BX SKN SINGLE LES: CPT | Mod: GC

## 2023-08-01 PROCEDURE — 99232 SBSQ HOSP IP/OBS MODERATE 35: CPT | Mod: GC

## 2023-08-01 RX ORDER — TRAMADOL HYDROCHLORIDE 50 MG/1
50 TABLET ORAL EVERY 6 HOURS
Refills: 0 | Status: DISCONTINUED | OUTPATIENT
Start: 2023-08-01 | End: 2023-08-02

## 2023-08-01 RX ORDER — MORPHINE SULFATE 50 MG/1
2 CAPSULE, EXTENDED RELEASE ORAL EVERY 4 HOURS
Refills: 0 | Status: DISCONTINUED | OUTPATIENT
Start: 2023-08-01 | End: 2023-08-01

## 2023-08-01 RX ORDER — HYDROMORPHONE HYDROCHLORIDE 2 MG/ML
0.5 INJECTION INTRAMUSCULAR; INTRAVENOUS; SUBCUTANEOUS
Refills: 0 | Status: DISCONTINUED | OUTPATIENT
Start: 2023-08-01 | End: 2023-08-01

## 2023-08-01 RX ORDER — TRAMADOL HYDROCHLORIDE 50 MG/1
25 TABLET ORAL EVERY 6 HOURS
Refills: 0 | Status: DISCONTINUED | OUTPATIENT
Start: 2023-08-01 | End: 2023-08-01

## 2023-08-01 RX ADMIN — SODIUM CHLORIDE 100 MILLILITER(S): 9 INJECTION, SOLUTION INTRAVENOUS at 00:18

## 2023-08-01 RX ADMIN — METHOCARBAMOL 750 MILLIGRAM(S): 500 TABLET, FILM COATED ORAL at 06:14

## 2023-08-01 RX ADMIN — Medication 1 APPLICATION(S): at 06:16

## 2023-08-01 RX ADMIN — METHOCARBAMOL 750 MILLIGRAM(S): 500 TABLET, FILM COATED ORAL at 23:02

## 2023-08-01 RX ADMIN — METHOCARBAMOL 750 MILLIGRAM(S): 500 TABLET, FILM COATED ORAL at 13:56

## 2023-08-01 RX ADMIN — AMLODIPINE BESYLATE 5 MILLIGRAM(S): 2.5 TABLET ORAL at 06:15

## 2023-08-01 RX ADMIN — DULOXETINE HYDROCHLORIDE 30 MILLIGRAM(S): 30 CAPSULE, DELAYED RELEASE ORAL at 23:02

## 2023-08-01 NOTE — PROGRESS NOTE ADULT - ASSESSMENT
43 year old F w/ a PMHx of of peripheral neuropathy (diagnosed at OSH in 3/2023), sickle cell trait, asthma, and anemia who presented to the ED by EMS after falling at home. General surgery consulted for abdominal fat pad biopsy to r/o amyloidosis.    - OR today for fat pad biopsy  - F/u AM labs  - Appreciate medical optimization      B Team Surgery (Acute Care Surgery)  [id number]

## 2023-08-01 NOTE — PROGRESS NOTE ADULT - SUBJECTIVE AND OBJECTIVE BOX
Neurology Progress Note    SUBJECTIVE/OBJECTIVE/INTERVAL EVENTS: Patient seen and examined at bedside w/ neuro attending and team.     INTERVAL HISTORY: Explained to patient that neurological workup has thus far been negative, that EEG was negative for seizure activity. Patient however continues to have episodes of cramps but also involuntary movements that she cannot control which began after the LP. Patient had episode while patient was being examined by neuro team at bedside. Requesting second opinion with private neurologist (Dr. Moore).     MEDICATIONS  (STANDING):  amLODIPine   Tablet 5 milliGRAM(s) Oral daily  artificial  tears Solution 1 Drop(s) Both EYES four times a day  DULoxetine 30 milliGRAM(s) Oral at bedtime  lidocaine   4% Patch 1 Patch Transdermal daily  methocarbamol 750 milliGRAM(s) Oral three times a day  nystatin/triamcinolone Ointment 1 Application(s) Topical two times a day  polyethylene glycol 3350 17 Gram(s) Oral daily    MEDICATIONS  (PRN):  diphenhydrAMINE 50 milliGRAM(s) Oral once PRN Anxiety  diphenhydrAMINE Injectable 25 milliGRAM(s) IV Push every 6 hours PRN Rash and/or Itching  LORazepam   Injectable 2 milliGRAM(s) IV Push once PRN Anxiety  morphine  - Injectable 2 milliGRAM(s) IV Push every 4 hours PRN BREAKTHROUGH PAIN  traMADol 25 milliGRAM(s) Oral every 6 hours PRN Moderate Pain (4 - 6)  traMADol 50 milliGRAM(s) Oral every 6 hours PRN Severe Pain (7 - 10)      VITALS & EXAMINATION:  Vital Signs Last 24 Hrs  T(C): 36.5 (01 Aug 2023 10:00), Max: 36.7 (31 Jul 2023 21:59)  T(F): 97.7 (01 Aug 2023 10:00), Max: 98.1 (31 Jul 2023 21:59)  HR: 81 (01 Aug 2023 10:15) (76 - 93)  BP: 121/80 (01 Aug 2023 10:15) (110/66 - 134/88)  BP(mean): 90 (01 Aug 2023 10:15) (88 - 97)  RR: 12 (01 Aug 2023 10:15) (12 - 18)  SpO2: 100% (01 Aug 2023 10:15) (95% - 100%)    Parameters below as of 01 Aug 2023 10:15  Patient On (Oxygen Delivery Method): room air        General:  Constitutional: Female, appears stated age  Neurological (>12):  MS: Awake, alert.  Oriented person place situation. Follows commands. Attends to examiner  Language: Speech is hypophonic, clear, fluent, good repetition,  comprehension, registration of words.  CNs: PERRL (R 3mm, L 3mm). VFF. EOMI. No disconjugate gaze, skew. V1-3 intact LT, No facial asymmetry b/l. Hearing grossly normal b/l. Tongue midline.     Motor - Normal bulk and tone throughout. No pronator drift.  Several episodes of cramps in the leg with flexion in the hips and knees, patient did not extend limbs during these cramps saying "she needs to let it finish".    L/R (out of 5)       Deltoid  5/5    Biceps   5/5      Triceps  5/5         Wrist Extension 5/5   Wrist Flexion  5/5   Interossei 5/5     5/5   L/R (out of 5)       Hip Flexion  5/5    Hip Extension  5/5  Knee Extension  5/5  Dorsiflexion  5/5      Plantar Flexion 5/5     Sensation: Intact to LT b/l. Cortical: Extinction on DSS (neglect): none  Reflexes L/R:  Biceps(C5) 2/2  BR(C6) 2/2   Triceps(C7)  2/2 Patellar(L4)   2/2   Toes: mute  Coordination: No dysmetria to FTN b/l UE    LABORATORY:  CBC                       11.7   5.12  )-----------( 280      ( 01 Aug 2023 05:11 )             35.7     Chem 08-01    137  |  101  |  11  ----------------------------<  79  3.7   |  23  |  0.44<L>    Ca    9.5      01 Aug 2023 05:11  Phos  4.2     08-01  Mg     1.90     08-01    TPro  7.3  /  Alb  3.6  /  TBili  0.5  /  DBili  x   /  AST  109<H>  /  ALT  49<H>  /  AlkPhos  66  07-31    LFTs LIVER FUNCTIONS - ( 31 Jul 2023 07:10 )  Alb: 3.6 g/dL / Pro: 7.3 g/dL / ALK PHOS: 66 U/L / ALT: 49 U/L / AST: 109 U/L / GGT: x           Coagulopathy PT/INR - ( 01 Aug 2023 05:11 )   PT: 12.1 sec;   INR: 1.07 ratio         PTT - ( 01 Aug 2023 05:11 )  PTT:30.6 sec  Lipid Panel   A1c   Cardiac enzymes     U/A Urinalysis Basic - ( 01 Aug 2023 05:11 )    Color: x / Appearance: x / SG: x / pH: x  Gluc: 79 mg/dL / Ketone: x  / Bili: x / Urobili: x   Blood: x / Protein: x / Nitrite: x   Leuk Esterase: x / RBC: x / WBC x   Sq Epi: x / Non Sq Epi: x / Bacteria: x      CSF  Immunological  Other    STUDIES & IMAGING: (EEG, CT, MR, U/S, TTE/SHREYAS): Neurology Progress Note    SUBJECTIVE/OBJECTIVE/INTERVAL EVENTS: Patient seen and examined at bedside w/ neuro attending and team.     INTERVAL HISTORY: Explained to patient that neurological workup has thus far been negative, that EEG was negative for seizure activity. Patient however continues to have episodes of cramps but also involuntary movements that she cannot control which began after the LP. Patient had episode while patient was being examined by neuro team at bedside.     MEDICATIONS  (STANDING):  amLODIPine   Tablet 5 milliGRAM(s) Oral daily  artificial  tears Solution 1 Drop(s) Both EYES four times a day  DULoxetine 30 milliGRAM(s) Oral at bedtime  lidocaine   4% Patch 1 Patch Transdermal daily  methocarbamol 750 milliGRAM(s) Oral three times a day  nystatin/triamcinolone Ointment 1 Application(s) Topical two times a day  polyethylene glycol 3350 17 Gram(s) Oral daily    MEDICATIONS  (PRN):  diphenhydrAMINE 50 milliGRAM(s) Oral once PRN Anxiety  diphenhydrAMINE Injectable 25 milliGRAM(s) IV Push every 6 hours PRN Rash and/or Itching  LORazepam   Injectable 2 milliGRAM(s) IV Push once PRN Anxiety  morphine  - Injectable 2 milliGRAM(s) IV Push every 4 hours PRN BREAKTHROUGH PAIN  traMADol 25 milliGRAM(s) Oral every 6 hours PRN Moderate Pain (4 - 6)  traMADol 50 milliGRAM(s) Oral every 6 hours PRN Severe Pain (7 - 10)      VITALS & EXAMINATION:  Vital Signs Last 24 Hrs  T(C): 36.5 (01 Aug 2023 10:00), Max: 36.7 (31 Jul 2023 21:59)  T(F): 97.7 (01 Aug 2023 10:00), Max: 98.1 (31 Jul 2023 21:59)  HR: 81 (01 Aug 2023 10:15) (76 - 93)  BP: 121/80 (01 Aug 2023 10:15) (110/66 - 134/88)  BP(mean): 90 (01 Aug 2023 10:15) (88 - 97)  RR: 12 (01 Aug 2023 10:15) (12 - 18)  SpO2: 100% (01 Aug 2023 10:15) (95% - 100%)    Parameters below as of 01 Aug 2023 10:15  Patient On (Oxygen Delivery Method): room air        General:  Constitutional: Female, appears stated age  Neurological (>12):  MS: Awake, alert.  Oriented person place situation. Follows commands. Attends to examiner  Language: Speech is hypophonic, clear, fluent, good repetition,  comprehension, registration of words.  CNs: PERRL (R 3mm, L 3mm). VFF. EOMI. No disconjugate gaze, skew. V1-3 intact LT, No facial asymmetry b/l. Hearing grossly normal b/l. Tongue midline.     Motor - Normal bulk and tone throughout. No pronator drift.  Several episodes of cramps in the leg with flexion in the hips and knees, patient did not extend limbs during these cramps saying "she needs to let it finish".    L/R (out of 5)       Deltoid  5/5    Biceps   5/5      Triceps  5/5         Wrist Extension 5/5   Wrist Flexion  5/5   Interossei 5/5     5/5   L/R (out of 5)       Hip Flexion  5/5    Hip Extension  5/5  Knee Extension  5/5  Dorsiflexion  5/5      Plantar Flexion 5/5     Sensation: Intact to LT b/l. Cortical: Extinction on DSS (neglect): none  Reflexes L/R:  Biceps(C5) 2/2  BR(C6) 2/2   Triceps(C7)  2/2 Patellar(L4)   2/2   Toes: mute  Coordination: No dysmetria to FTN b/l UE    LABORATORY:  CBC                       11.7   5.12  )-----------( 280      ( 01 Aug 2023 05:11 )             35.7     Chem 08-01    137  |  101  |  11  ----------------------------<  79  3.7   |  23  |  0.44<L>    Ca    9.5      01 Aug 2023 05:11  Phos  4.2     08-01  Mg     1.90     08-01    TPro  7.3  /  Alb  3.6  /  TBili  0.5  /  DBili  x   /  AST  109<H>  /  ALT  49<H>  /  AlkPhos  66  07-31    LFTs LIVER FUNCTIONS - ( 31 Jul 2023 07:10 )  Alb: 3.6 g/dL / Pro: 7.3 g/dL / ALK PHOS: 66 U/L / ALT: 49 U/L / AST: 109 U/L / GGT: x           Coagulopathy PT/INR - ( 01 Aug 2023 05:11 )   PT: 12.1 sec;   INR: 1.07 ratio         PTT - ( 01 Aug 2023 05:11 )  PTT:30.6 sec  Lipid Panel   A1c   Cardiac enzymes     U/A Urinalysis Basic - ( 01 Aug 2023 05:11 )    Color: x / Appearance: x / SG: x / pH: x  Gluc: 79 mg/dL / Ketone: x  / Bili: x / Urobili: x   Blood: x / Protein: x / Nitrite: x   Leuk Esterase: x / RBC: x / WBC x   Sq Epi: x / Non Sq Epi: x / Bacteria: x      CSF  Immunological  Other    STUDIES & IMAGING: (EEG, CT, MR, U/S, TTE/SHREYAS):

## 2023-08-01 NOTE — PROGRESS NOTE ADULT - ATTENDING COMMENTS
Patient seen and examined at bedside. During my evaluation, Hospitalist, neurology team and nurse manager Mr. Pulliam were present. I appreciate that.  Patient confirmed that she lives at home with children. she is getting therapy from psychologist for unclear reason.     Ms. Perez is a 43 year  woman with PMHx of peripheral neuropathy, sickle cell trait, asthma who was admitted due to the chronic progressively worsening weakness and painful paresthesias. During admission, patient is having intermittent episodes of movement with preserved consciousness.  Etiology of above symptoms remains uncertain. Also during admission, she reported visual symptoms due to uncertain etiology. Somewhat difficult to explain constellation of symptoms and localize. During admission she required extensive work up Including MRI brain and Spine, EGG and LP and Nerve conduction velocity.  Normal motor nerve conduction studies on nerve conduction velocity and there is no lesion in the brain or spinal cord to explain the weakness. EEG captured numerous clinical events are not epileptic in nature otherwise, normal EEG.  There is no further in patient neurology work up needed. She will benefit from psychiatry evaluation to rule out treatable etiologies.   Continue medical management, neuro- check and fall precaution.  Continue PT/OT.   GI and DVT prophylaxis.  If you have any further questions, please do not hesitate to contact our team.  Thank you for allowing us to participate in this patient care.

## 2023-08-01 NOTE — PROGRESS NOTE ADULT - SUBJECTIVE AND OBJECTIVE BOX
SURGERY PROGRESS NOTE    SUBJECTIVE / 24H EVENTS:  Patient seen and examined on morning rounds. No acute events overnight. In agreement for surgery this morning      OBJECTIVE:  VITAL SIGNS:  T(C): 36.5 (08-01-23 @ 07:56), Max: 36.7 (07-31-23 @ 21:59)  HR: 76 (08-01-23 @ 07:56) (68 - 89)  BP: 114/70 (08-01-23 @ 07:56) (109/67 - 120/88)  RR: 18 (08-01-23 @ 07:56) (17 - 18)  SpO2: 95% (08-01-23 @ 07:56) (95% - 98%)  Daily Height in cm: 165.1 (01 Aug 2023 06:56)    Daily       PHYSICAL EXAM:  Gen: NAD  LS: Respirations unlabored on rA  GI: Soft. Nontender. Nondistended.   Ext: Warm, well perfused        LAB VALUES:  08-01    137  |  101  |  11  ----------------------------<  79  3.7   |  23  |  0.44<L>    Ca    9.5      01 Aug 2023 05:11  Phos  4.2     08-01  Mg     1.90     08-01    TPro  7.3  /  Alb  3.6  /  TBili  0.5  /  DBili  x   /  AST  109<H>  /  ALT  49<H>  /  AlkPhos  66  07-31                               11.7   5.12  )-----------( 280      ( 01 Aug 2023 05:11 )             35.7     LIVER FUNCTIONS - ( 31 Jul 2023 07:10 )  Alb: 3.6 g/dL / Pro: 7.3 g/dL / ALK PHOS: 66 U/L / ALT: 49 U/L / AST: 109 U/L / GGT: x           PT/INR - ( 01 Aug 2023 05:11 )   PT: 12.1 sec;   INR: 1.07 ratio         PTT - ( 01 Aug 2023 05:11 )  PTT:30.6 sec        Urinalysis Basic - ( 01 Aug 2023 05:11 )    Color: x / Appearance: x / SG: x / pH: x  Gluc: 79 mg/dL / Ketone: x  / Bili: x / Urobili: x   Blood: x / Protein: x / Nitrite: x   Leuk Esterase: x / RBC: x / WBC x   Sq Epi: x / Non Sq Epi: x / Bacteria: x        RADIOLOGY:  PACS Image: Image(s) Available (07-31-23 @ 10:22)        MEDICATIONS  (STANDING):  amLODIPine   Tablet 5 milliGRAM(s) Oral daily  artificial  tears Solution 1 Drop(s) Both EYES four times a day  DULoxetine 30 milliGRAM(s) Oral at bedtime  lactated ringers. 1000 milliLiter(s) (100 mL/Hr) IV Continuous <Continuous>  lidocaine   4% Patch 1 Patch Transdermal daily  methocarbamol 750 milliGRAM(s) Oral three times a day  nystatin/triamcinolone Ointment 1 Application(s) Topical two times a day  polyethylene glycol 3350 17 Gram(s) Oral daily    MEDICATIONS  (PRN):  diphenhydrAMINE 50 milliGRAM(s) Oral once PRN Anxiety  diphenhydrAMINE Injectable 25 milliGRAM(s) IV Push every 6 hours PRN Rash and/or Itching  HYDROmorphone  Injectable 0.5 milliGRAM(s) IV Push every 10 minutes PRN Moderate Pain (4 - 6)  LORazepam   Injectable 2 milliGRAM(s) IV Push once PRN Anxiety

## 2023-08-01 NOTE — PROGRESS NOTE ADULT - ATTENDING COMMENTS
43F with PMH of sickle cell trait, asthma, peripheral neuropathy who presents s/p fall with ongoing weakness/paresthesia. Neuro on board. Further workup in progress to eval for other neuro culprits. MRI H/spine showed no acute pathologies. LP done. Available results not indicative of any pathologies. Additional serologies in progress. Hospital course notable for questionable seizure-like activities - EEG showed no evidence of seizures. CT chest w/o contrast showed no acute pulmonary process. ECHO also unrevealing. BH consult noted. Medical/neuro workup ongoing - no further BH recs at this time. Fat pad bx completed on 8/1. Remains hospitalized pending further workup.     Pt seen at bedside. Met with pt myrna w/ patient advocate [facility] Reviewed latest POC. Pt voiced frsutration w/ hospital course and not having answers. Feels she has deteriorated since coming in. States that her sx of spasms set in after LP. She reports worsening spasms that has progressed from LE up to her mouth. Also upset with BH involvement. Exam otherwise unchanged. Pt was laying comfortable in bed. Moves all extremities spontaneously, but did report sudden worsening episode of spasm of LE, but also spontaneously resolved within seconds from visual assessment. No speech impediment throughout discussion. Labs today are NORMAL.    #LE weakness, paresthesia  #Transaminitis - prob fatty liver    -Neuro recs noted. Now s/p fat pad bx.  -CSF studies still pending- paraneoplastic/oligoclonal bands. Fat pad bx in progress.  -Pain control issues noted - cont duloxetine, can titrate up to 60. Can continue lyrica, lido patch and methocarbamol (switched from baclofen).  Will start tramadol PO for mod/severe. Will add morphine IV for breakthrough.  -Patient centered care meeting w/ RN manager and myself. Pts concerns noted. Will f/u with neuro for further guidance input. Dispo planning in progress - pt needs ACUTE rehab. Discussed necessity as a part of her recovery. Although we do not have a diagnosis - we will discuss with neuro if there are any additional diagnostics or pending test results that would require pt to remain in hospital. Stressed importance of rehab in role for her to preserve and regain physical function.     Rest of plan as above. DC planning to ACUTE rehab once evaluation completes.      Addendum:  Returned to bedside alongside neuro team. Reviewed POC. Diagnostics. And recommendations. Pt requesting second opinion of another neurologist - will place request. Will f/u.     In total 60 minutes spent at bedside for counseling/review of plan of care.

## 2023-08-01 NOTE — PROGRESS NOTE ADULT - PROBLEM SELECTOR PLAN 1
Ascending weakness and paresthesias, beginning in 3/2023  Possible CIDP vs Guillan [place] vs transmyelitis   EMG: No electrophysiologic evidence of a motor polyradiculoneuropathy - findings are not consistent with a diagnosis of CIDP  MRI head/cervical/thoracic: Mid cervical degenerative disc disease, C4-C5 broad irregular right central and C5-C6 focal right foraminal disc protrusion (disc herniation) that cause ventral cord deformity. Not consistent with clinical presentation per neurosurgery    Plan:  - LP under sedation performed 7/27; CSF studies have been unremarkable so far  - MRI under sedation completed 7/26/23- unremarkable findings   - Also started on robaxin 750mg TID and motrin 600mg and duloxetine 30mg for additional pain control  - Neurology following; appreciate recs  -EEG, TTE normal  - fatpad biopsy done 8/1  - f/u transthyretin, CT chest  -After psych consult, does not appear pt is amenable to psych interventions

## 2023-08-01 NOTE — PROGRESS NOTE ADULT - SUBJECTIVE AND OBJECTIVE BOX
PROGRESS NOTE:   Authored by Dr. Bharat Venegas MD (PGY-1).     Patient is a 43y old  Female who presents with a chief complaint of Weakness in lower and upper extremities (01 Aug 2023 08:15)      SUBJECTIVE / OVERNIGHT EVENTS:  No acute events overnight. She has no acute complaints this morning.     MEDICATIONS  (STANDING):  amLODIPine   Tablet 5 milliGRAM(s) Oral daily  artificial  tears Solution 1 Drop(s) Both EYES four times a day  DULoxetine 30 milliGRAM(s) Oral at bedtime  lactated ringers. 1000 milliLiter(s) (100 mL/Hr) IV Continuous <Continuous>  lidocaine   4% Patch 1 Patch Transdermal daily  methocarbamol 750 milliGRAM(s) Oral three times a day  nystatin/triamcinolone Ointment 1 Application(s) Topical two times a day  polyethylene glycol 3350 17 Gram(s) Oral daily    MEDICATIONS  (PRN):  diphenhydrAMINE 50 milliGRAM(s) Oral once PRN Anxiety  diphenhydrAMINE Injectable 25 milliGRAM(s) IV Push every 6 hours PRN Rash and/or Itching  HYDROmorphone  Injectable 0.5 milliGRAM(s) IV Push every 10 minutes PRN Moderate Pain (4 - 6)  LORazepam   Injectable 2 milliGRAM(s) IV Push once PRN Anxiety      CAPILLARY BLOOD GLUCOSE        I&O's Summary    01 Aug 2023 07:01  -  01 Aug 2023 10:35  --------------------------------------------------------  IN: 100 mL / OUT: 0 mL / NET: 100 mL        PHYSICAL EXAM:  Vital Signs Last 24 Hrs  T(C): 36.5 (01 Aug 2023 10:00), Max: 36.7 (31 Jul 2023 21:59)  T(F): 97.7 (01 Aug 2023 10:00), Max: 98.1 (31 Jul 2023 21:59)  HR: 81 (01 Aug 2023 10:15) (68 - 93)  BP: 121/80 (01 Aug 2023 10:15) (109/67 - 134/88)  BP(mean): 90 (01 Aug 2023 10:15) (88 - 97)  RR: 12 (01 Aug 2023 10:15) (12 - 18)  SpO2: 100% (01 Aug 2023 10:15) (95% - 100%)    Parameters below as of 01 Aug 2023 10:15  Patient On (Oxygen Delivery Method): room air        CONSTITUTIONAL: NAD, well-developed  HEET: MMM, EOMI, PERRLA  NECK: supple  RESPIRATORY: Normal respiratory effort; lungs are clear to auscultation bilaterally  CARDIOVASCULAR: Regular rate and rhythm, normal S1 and S2, no murmur/rub/gallop; No lower extremity edema; Peripheral pulses are 2+ bilaterally  ABDOMEN: Nontender to palpation, normoactive bowel sounds, no rebound/guarding; No hepatosplenomegaly  MUSCULOSKELETAL: no clubbing or cyanosis of digits; no joint swelling or tenderness to palpation  NEURO: 5/5 strength in upper and lower extremities b/l on flexion and extension. Diminished sensation in lower extremities  PSYCH: A+O to person, place, and time; affect appropriate  SKIN: No rash    LABS:                        11.7   5.12  )-----------( 280      ( 01 Aug 2023 05:11 )             35.7     08-01    137  |  101  |  11  ----------------------------<  79  3.7   |  23  |  0.44<L>    Ca    9.5      01 Aug 2023 05:11  Phos  4.2     08-01  Mg     1.90     08-01    TPro  7.3  /  Alb  3.6  /  TBili  0.5  /  DBili  x   /  AST  109<H>  /  ALT  49<H>  /  AlkPhos  66  07-31    PT/INR - ( 01 Aug 2023 05:11 )   PT: 12.1 sec;   INR: 1.07 ratio         PTT - ( 01 Aug 2023 05:11 )  PTT:30.6 sec      Urinalysis Basic - ( 01 Aug 2023 05:11 )    Color: x / Appearance: x / SG: x / pH: x  Gluc: 79 mg/dL / Ketone: x  / Bili: x / Urobili: x   Blood: x / Protein: x / Nitrite: x   Leuk Esterase: x / RBC: x / WBC x   Sq Epi: x / Non Sq Epi: x / Bacteria: x          Tele Reviewed:    RADIOLOGY & ADDITIONAL TESTS:  Results Reviewed:   Imaging Personally Reviewed:  Electrocardiogram Personally Reviewed:

## 2023-08-01 NOTE — CHART NOTE - NSCHARTNOTEFT_GEN_A_CORE
POST-OPERATIVE CHECK    SUBJECTIVE  Patient is s/p Abdominal fat pad biopsy. Pt is resting comfortably in bed. Denies abd pain, or pain of any kind. Tolerating regular diet. Limited mobility given history -- encouraged IS and movement with assistance per primary. Denies nausea, vomiting, chest pain.    Vital Signs Last 24 Hrs  T(C): 36.5 (01 Aug 2023 10:00), Max: 36.7 (31 Jul 2023 21:59)  T(F): 97.7 (01 Aug 2023 10:00), Max: 98.1 (31 Jul 2023 21:59)  HR: 81 (01 Aug 2023 10:15) (76 - 93)  BP: 121/80 (01 Aug 2023 10:15) (110/66 - 134/88)  BP(mean): 90 (01 Aug 2023 10:15) (88 - 97)  RR: 12 (01 Aug 2023 10:15) (12 - 18)  SpO2: 100% (01 Aug 2023 10:15) (95% - 100%)    Parameters below as of 01 Aug 2023 10:15  Patient On (Oxygen Delivery Method): room air      I&O's Detail    01 Aug 2023 07:01  -  01 Aug 2023 18:04  --------------------------------------------------------  IN:    Lactated Ringers: 100 mL  Total IN: 100 mL    OUT:  Total OUT: 0 mL    Total NET: 100 mL        amLODIPine   Tablet 5    PAST MEDICAL & SURGICAL HISTORY:  Peripheral neuropathy      Anemia      Asthma      Sickle cell trait      S/P cholecystectomy          PHYSICAL EXAM  General: NAD, resting comfortably in bed  Pulmonary: Nonlabored breathing, no respiratory distress  Cardiovascular: NSR  Abdominal: soft, NT/ND. Abd without tympani. LLQ incision covered by surgical glue, undressed, C/d/i. No tenderness or erythema.   Extremities: WWP    LABS                        11.7   5.12  )-----------( 280      ( 01 Aug 2023 05:11 )             35.7     08-01    137  |  101  |  11  ----------------------------<  79  3.7   |  23  |  0.44<L>    Ca    9.5      01 Aug 2023 05:11  Phos  4.2     08-01  Mg     1.90     08-01    TPro  7.3  /  Alb  3.6  /  TBili  0.5  /  DBili  x   /  AST  109<H>  /  ALT  49<H>  /  AlkPhos  66  07-31    PT/INR - ( 01 Aug 2023 05:11 )   PT: 12.1 sec;   INR: 1.07 ratio         PTT - ( 01 Aug 2023 05:11 )  PTT:30.6 sec  CAPILLARY BLOOD GLUCOSE          IMAGING    ASSESSMENT  43 year old F w/ a PMHx of of peripheral neuropathy (diagnosed at OSH in 3/2023), sickle cell trait, asthma, and anemia who presented to the ED by EMS after falling at home. Being worked up for amyloidosis.     Now, POD 0 s/p Abdominal fat pad biopsy 08-01. Recovering appropriately on the floor.    PLAN  - C/w reg diet  - Pain control as needed  - DVT ppx  - OOB and ambulating as tolerated  - F/u AM labs

## 2023-08-01 NOTE — PROGRESS NOTE ADULT - ASSESSMENT
Ms. Perez is a 44 yo woman with 8 months of progressively worsening painful paresthesias and areflexia.  There is no etiology found for her weakness - either in the PNS or CNS - there are normal motor nerve conduction studies and there is no lesion in the brain or spinal cord to explain the weakness.   Patient EEG negative also for seizures.    Patient was seen by Dr. Ward with neurology team and  Primary team attending Dr. Montenegro and nurse manager present at bedside and explained to her that these symptoms can be related to stress and/or psychological issues. Patient confessed that she does see a therapist just to talk and has had anxiety/depression prior to coming into the hospital which she attributes to grief from losing her sister. She states that she feels unhappy with the lumbar puncture given that afterwards she started experiencing these uncontrollable spasms and cramps in the lower extremities.     The character of her sensory symptoms and areflexia are more consistent with a large and small fiber sensory polyneuropathy but due to artifacts it is not possible to confirm or exclude a large fiber sensory polyneuropathy with the EMG/NCS.  A small fiber polyneuropathy can never be assessed with NCSs.     Patient's case was discussed with Dr. Sumit Hernandez over the phone, who states that an outpatient NCS could be done to assess for a large fiber sensory polyneuropathy.  Pattern of sensory loss and changes over time are unusual.     For neuropathic pain - continue pregabalin 200 mg BID and duloxetine 30 mg daily with dinner.  Can increase to 60 mg daily if she tolerates it after 2 more days.  We increased baclofen for cramps from 10 to 15 mg TID - she does not want to increase the dose at this time.     New onset of abnormal cramps and movements with preserved consciousness of uncertain etiology - EEG consistent with psychogenic nonepileptic spells. As per Dr. Quinonez's previous note, there will be a video of the episode to a movement disorders specialist to help with a diagnosis. Patient today requested input from Dr. Tom Ely (Private Neurologist).    D/W patient. Ms. Perez is a 44 yo woman with 8 months of progressively worsening painful paresthesias and areflexia.  There is no etiology found for her weakness - either in the PNS or CNS - there are normal motor nerve conduction studies and there is no lesion in the brain or spinal cord to explain the weakness.   Patient EEG negative also for seizures.    Patient was seen by Dr. Ward with neurology team and  Primary team attending Dr. Montenegro and nurse manager present at bedside and explained to her that these symptoms can be related to stress and/or psychological issues. Patient confessed that she does see a therapist just to talk and has had anxiety/depression prior to coming into the hospital which she attributes to grief from losing her sister. She states that she feels unhappy with the lumbar puncture given that afterwards she started experiencing these uncontrollable spasms and cramps in the lower extremities.     The character of her sensory symptoms and areflexia are more consistent with a large and small fiber sensory polyneuropathy but due to artifacts it is not possible to confirm or exclude a large fiber sensory polyneuropathy with the EMG/NCS.  A small fiber polyneuropathy can never be assessed with NCSs.     Patient's case was discussed with Dr. Sumit Hernandez over the phone, who states that an outpatient NCS could be done to assess for a large fiber sensory polyneuropathy.  Pattern of sensory loss and changes over time are unusual.     For neuropathic pain - continue pregabalin 200 mg BID and duloxetine 30 mg daily with dinner.  Can increase to 60 mg daily if she tolerates it after 2 more days.  We increased baclofen for cramps from 10 to 15 mg TID - she does not want to increase the dose at this time.     New onset of abnormal cramps and movements with preserved consciousness of uncertain etiology - EEG consistent with psychogenic nonepileptic spells. Patient today requested second opinion from another neurologist.      D/W patient. 43 year  woman with PMHx of peripheral neuropathy, sickle cell trait, asthma who was admitted after a chief complaint of chronic progressively worsening weakness and painful paresthesias. Patient is s/p extensive neurological workup for these symptoms, including MRI Brain and Spine, EEG, LP, and motor nerve conduction studies. EEG suggestive of psychogenic non-epileptiform seizures.    Impression: generalized weakness and painful parasthesias and cramps involving the LEs, chronic and progressive, etiology uncertain, however differential includes functional neurological disorder.     Recommendations:  [X] CT Head: Negative  [X] LP: CSF studies unremarkable thus far.  [ ] Post LP patient has endorsed cramps and bilateral lower extremity painful shaking episodes, which were seen and discussed with movement disorders specialist who states that these movements do not appear to be like that of a movement disorder but more functional. Patient should be continued on her antispasmodic agents as currently prescribed.   [X] EEG: Consistent with Psychogenic Nonepileptiform seizure.  [X] MR Brain and Head, negative.   [] PT eval: Recommending acute rehab upon discharge.  [] Psychiatry care and consult, given the likely dx is Functional Neurological disorder and patient has significant risk factors for anxiety/depression.

## 2023-08-01 NOTE — PROGRESS NOTE ADULT - PROBLEM SELECTOR PLAN 3
- Hypoechoic liver lesions and hepatic steatosis on RUQ US  - Hepatitis panel, HIV (-)  - CT A/P consistent with hepatic steatosis  - AST/ALT (109/49) 7/31/23  - ensure patient is not on any hepatotoxic meds

## 2023-08-02 PROCEDURE — 99233 SBSQ HOSP IP/OBS HIGH 50: CPT | Mod: GC

## 2023-08-02 RX ORDER — DULOXETINE HYDROCHLORIDE 30 MG/1
60 CAPSULE, DELAYED RELEASE ORAL DAILY
Refills: 0 | Status: DISCONTINUED | OUTPATIENT
Start: 2023-08-02 | End: 2023-08-03

## 2023-08-02 RX ADMIN — Medication 1 DROP(S): at 23:37

## 2023-08-02 RX ADMIN — METHOCARBAMOL 750 MILLIGRAM(S): 500 TABLET, FILM COATED ORAL at 06:26

## 2023-08-02 RX ADMIN — Medication 1 APPLICATION(S): at 06:27

## 2023-08-02 RX ADMIN — DULOXETINE HYDROCHLORIDE 60 MILLIGRAM(S): 30 CAPSULE, DELAYED RELEASE ORAL at 21:52

## 2023-08-02 RX ADMIN — Medication 200 MILLIGRAM(S): at 06:26

## 2023-08-02 RX ADMIN — Medication 200 MILLIGRAM(S): at 17:38

## 2023-08-02 RX ADMIN — AMLODIPINE BESYLATE 5 MILLIGRAM(S): 2.5 TABLET ORAL at 06:27

## 2023-08-02 RX ADMIN — Medication 1 DROP(S): at 11:46

## 2023-08-02 RX ADMIN — TRAMADOL HYDROCHLORIDE 50 MILLIGRAM(S): 50 TABLET ORAL at 23:36

## 2023-08-02 RX ADMIN — POLYETHYLENE GLYCOL 3350 17 GRAM(S): 17 POWDER, FOR SOLUTION ORAL at 11:46

## 2023-08-02 RX ADMIN — METHOCARBAMOL 750 MILLIGRAM(S): 500 TABLET, FILM COATED ORAL at 21:52

## 2023-08-02 RX ADMIN — METHOCARBAMOL 750 MILLIGRAM(S): 500 TABLET, FILM COATED ORAL at 13:06

## 2023-08-02 NOTE — PROGRESS NOTE ADULT - SUBJECTIVE AND OBJECTIVE BOX
PROGRESS NOTE:   Authored by Dr. Bharat Venegas MD (PGY-1).     Patient is a 43y old  Female who presents with a chief complaint of Weakness in lower and upper extremities (02 Aug 2023 07:53)      SUBJECTIVE / OVERNIGHT EVENTS:  No acute events overnight. She has no acute complaints this morning.     MEDICATIONS  (STANDING):  amLODIPine   Tablet 5 milliGRAM(s) Oral daily  artificial  tears Solution 1 Drop(s) Both EYES four times a day  DULoxetine 30 milliGRAM(s) Oral at bedtime  lidocaine   4% Patch 1 Patch Transdermal daily  methocarbamol 750 milliGRAM(s) Oral three times a day  nystatin/triamcinolone Ointment 1 Application(s) Topical two times a day  polyethylene glycol 3350 17 Gram(s) Oral daily  pregabalin 200 milliGRAM(s) Oral two times a day    MEDICATIONS  (PRN):  diphenhydrAMINE 50 milliGRAM(s) Oral once PRN Anxiety  diphenhydrAMINE Injectable 25 milliGRAM(s) IV Push every 6 hours PRN Rash and/or Itching  LORazepam   Injectable 2 milliGRAM(s) IV Push once PRN Anxiety  morphine  - Injectable 2 milliGRAM(s) IV Push every 4 hours PRN BREAKTHROUGH PAIN  traMADol 25 milliGRAM(s) Oral every 6 hours PRN Moderate Pain (4 - 6)  traMADol 50 milliGRAM(s) Oral every 6 hours PRN Severe Pain (7 - 10)      CAPILLARY BLOOD GLUCOSE        I&O's Summary    01 Aug 2023 07:01  -  02 Aug 2023 07:00  --------------------------------------------------------  IN: 100 mL / OUT: 0 mL / NET: 100 mL        PHYSICAL EXAM:  Vital Signs Last 24 Hrs  T(C): 36.7 (02 Aug 2023 05:33), Max: 36.9 (01 Aug 2023 22:26)  T(F): 98.1 (02 Aug 2023 05:33), Max: 98.5 (01 Aug 2023 22:26)  HR: 86 (02 Aug 2023 05:33) (78 - 90)  BP: 121/73 (02 Aug 2023 05:33) (115/78 - 126/88)  BP(mean): 90 (01 Aug 2023 10:15) (90 - 97)  RR: 18 (02 Aug 2023 05:33) (12 - 18)  SpO2: 98% (02 Aug 2023 05:33) (96% - 100%)    Parameters below as of 02 Aug 2023 05:33  Patient On (Oxygen Delivery Method): room air        CONSTITUTIONAL: NAD, well-developed  HEET: MMM, EOMI, PERRLA  NECK: supple  RESPIRATORY: Normal respiratory effort; lungs are clear to auscultation bilaterally  CARDIOVASCULAR: Regular rate and rhythm, normal S1 and S2, no murmur/rub/gallop; No lower extremity edema; Peripheral pulses are 2+ bilaterally  ABDOMEN: Nontender to palpation, normoactive bowel sounds, no rebound/guarding; No hepatosplenomegaly  MUSCULOSKELETAL: no clubbing or cyanosis of digits; no joint swelling or tenderness to palpation  NEURO: 5/5 strength in upper and lower extremities b/l on flexion and extension. Diminished sensation in lower extremities  PSYCH: A+O to person, place, and time; affect appropriate  SKIN: No rash    LABS:                        11.7   5.12  )-----------( 280      ( 01 Aug 2023 05:11 )             35.7     08-01    137  |  101  |  11  ----------------------------<  79  3.7   |  23  |  0.44<L>    Ca    9.5      01 Aug 2023 05:11  Phos  4.2     08-01  Mg     1.90     08-01      PT/INR - ( 01 Aug 2023 05:11 )   PT: 12.1 sec;   INR: 1.07 ratio         PTT - ( 01 Aug 2023 05:11 )  PTT:30.6 sec      Urinalysis Basic - ( 01 Aug 2023 05:11 )    Color: x / Appearance: x / SG: x / pH: x  Gluc: 79 mg/dL / Ketone: x  / Bili: x / Urobili: x   Blood: x / Protein: x / Nitrite: x   Leuk Esterase: x / RBC: x / WBC x   Sq Epi: x / Non Sq Epi: x / Bacteria: x          Tele Reviewed:    RADIOLOGY & ADDITIONAL TESTS:  Results Reviewed:   Imaging Personally Reviewed:  Electrocardiogram Personally Reviewed:

## 2023-08-02 NOTE — PROGRESS NOTE ADULT - PROBLEM SELECTOR PLAN 4
Unlikely ACS, EKG NSR, trop (-)  Endorses mid epigastric burning pain  Also MSK in nature as pt has been using her upper body to compensate for LE weakness  Improved with rest since admission no

## 2023-08-02 NOTE — PROGRESS NOTE ADULT - PROBLEM SELECTOR PLAN 1
Ascending weakness and paresthesias, beginning in 3/2023  Possible CIDP vs Guillan [place] vs transmyelitis   EMG: No electrophysiologic evidence of a motor polyradiculoneuropathy - findings are not consistent with a diagnosis of CIDP  MRI head/cervical/thoracic: Mid cervical degenerative disc disease, C4-C5 broad irregular right central and C5-C6 focal right foraminal disc protrusion (disc herniation) that cause ventral cord deformity. Not consistent with clinical presentation per neurosurgery    Plan:  - Obtain 2nd opinion from Dr. Tom Moore   - LP under sedation performed 7/27; CSF studies have been unremarkable so far  - MRI under sedation completed 7/26/23- unremarkable findings   - Also started on robaxin 750mg TID and motrin 600mg and duloxetine 30mg for additional pain control  - Neurology following; appreciate recs  -EEG, TTE normal, CT chest normal   - fatpad biopsy done 8/; f/u path results  - f/u transthyretin, oligoclonal bands csf, paraneoplastic studies   -After psych consult, does not appear pt is amenable to psych interventions

## 2023-08-02 NOTE — PROGRESS NOTE ADULT - SUBJECTIVE AND OBJECTIVE BOX
ANESTHESIA POSTOP CHECK    43y Female POSTOP DAY 1    Vital Signs Last 24 Hrs  T(C): 36.7 (02 Aug 2023 05:33), Max: 36.9 (01 Aug 2023 22:26)  T(F): 98.1 (02 Aug 2023 05:33), Max: 98.5 (01 Aug 2023 22:26)  HR: 86 (02 Aug 2023 05:33) (86 - 86)  BP: 121/73 (02 Aug 2023 05:33) (115/78 - 121/73)  BP(mean): --  RR: 18 (02 Aug 2023 05:33) (18 - 18)  SpO2: 98% (02 Aug 2023 05:33) (96% - 98%)    Parameters below as of 02 Aug 2023 05:33  Patient On (Oxygen Delivery Method): room air      I&O's Summary    01 Aug 2023 07:01  -  02 Aug 2023 07:00  --------------------------------------------------------  IN: 100 mL / OUT: 0 mL / NET: 100 mL        [X ] NO APPARENT ANESTHESIA COMPLICATIONS      Comments:

## 2023-08-02 NOTE — PROGRESS NOTE ADULT - ATTENDING COMMENTS
43F with PMH of sickle cell trait, asthma, peripheral neuropathy who presents s/p fall with ongoing weakness/paresthesia. Neuro on board. Further workup in progress to eval for other neuro culprits. MRI H/spine showed no acute pathologies. LP done. Available results not indicative of any pathologies. Additional serologies in progress. Hospital course notable for questionable seizure-like activities - EEG showed no evidence of seizures. CT chest w/o contrast showed no acute pulmonary process. ECHO also unrevealing.  consult noted. Medical/neuro workup ongoing - no further  recs at this time. Fat pad bx completed on 8/1. Remains hospitalized pending further workup.     Pt seen at bedside. States she feels some positive benefits from Lyrica. Otherwise no new issues to report. Exam unchanged. No labs today.    #LE weakness, paresthesia, r/o somatic disorder  #Transaminitis - prob fatty liver    -Neuro recs noted. Now s/p fat pad bx.  -CSF studies still pending- paraneoplastic/oligoclonal bands. Fat pad bx in lab.  -Pain control issues noted - cont duloxetine, will increase to 60. Can continue lyrica, lido patch and methocarbamol (switched from baclofen).  Cont tramadol/morphine PRN.  -Awaiting 2nd opinion neuro assessment.  -Would benefit from further  follow up. As pt notes - she is on counseling for stress/anxiety.    Rest of plan as above. DC planning to ACUTE rehab once evaluation completes.      Addendum:  Returned to bedside alongside neuro team. Reviewed POC. Diagnostics. And recommendations. Pt requesting second opinion of another neurologist - will place request. Will f/u.     In total 60 minutes spent at bedside for counseling/review of plan of care.

## 2023-08-02 NOTE — PROGRESS NOTE ADULT - ASSESSMENT
43 year old F w/ a PMHx of of peripheral neuropathy (diagnosed at OSH in 3/2023), sickle cell trait, asthma, and anemia who presented to the ED by EMS after falling at home. General surgery consulted for abdominal fat pad biopsy to r/o amyloidosis. POD1 s/p abdominal fat pad biopsy, tolerated procedure well. Pain well controlled.     - F/u path  - Rest of care per primary team  - Please re-page with any concerns       B Team Surgery (Acute Care Surgery)  [id number]

## 2023-08-02 NOTE — PROGRESS NOTE ADULT - SUBJECTIVE AND OBJECTIVE BOX
SURGERY PROGRESS NOTE    SUBJECTIVE / 24H EVENTS:  Patient seen and examined on morning rounds. No acute events overnight. Patient reports pain well controlled      OBJECTIVE:  VITAL SIGNS:  T(C): 36.7 (08-02-23 @ 05:33), Max: 36.9 (08-01-23 @ 22:26)  HR: 86 (08-02-23 @ 05:33) (76 - 93)  BP: 121/73 (08-02-23 @ 05:33) (114/70 - 134/88)  RR: 18 (08-02-23 @ 05:33) (12 - 18)  SpO2: 98% (08-02-23 @ 05:33) (95% - 100%)  Daily     Daily       PHYSICAL EXAM:  Gen: NAD  LS: Respirations unlabored on RA  GI: Soft. Nontender. Nondistended. Abdominal wound with dermabond c/d/i  Ext: Warm, well perfused      08-01-23 @ 07:01  -  08-02-23 @ 07:00  --------------------------------------------------------  IN:    Lactated Ringers: 100 mL  Total IN: 100 mL    OUT:  Total OUT: 0 mL    Total NET: 100 mL          LAB VALUES:  08-01    137  |  101  |  11  ----------------------------<  79  3.7   |  23  |  0.44<L>    Ca    9.5      01 Aug 2023 05:11  Phos  4.2     08-01  Mg     1.90     08-01                                 11.7   5.12  )-----------( 280      ( 01 Aug 2023 05:11 )             35.7       PT/INR - ( 01 Aug 2023 05:11 )   PT: 12.1 sec;   INR: 1.07 ratio         PTT - ( 01 Aug 2023 05:11 )  PTT:30.6 sec        Urinalysis Basic - ( 01 Aug 2023 05:11 )    Color: x / Appearance: x / SG: x / pH: x  Gluc: 79 mg/dL / Ketone: x  / Bili: x / Urobili: x   Blood: x / Protein: x / Nitrite: x   Leuk Esterase: x / RBC: x / WBC x   Sq Epi: x / Non Sq Epi: x / Bacteria: x            MEDICATIONS  (STANDING):  amLODIPine   Tablet 5 milliGRAM(s) Oral daily  artificial  tears Solution 1 Drop(s) Both EYES four times a day  DULoxetine 30 milliGRAM(s) Oral at bedtime  lidocaine   4% Patch 1 Patch Transdermal daily  methocarbamol 750 milliGRAM(s) Oral three times a day  nystatin/triamcinolone Ointment 1 Application(s) Topical two times a day  polyethylene glycol 3350 17 Gram(s) Oral daily  pregabalin 200 milliGRAM(s) Oral two times a day    MEDICATIONS  (PRN):  diphenhydrAMINE 50 milliGRAM(s) Oral once PRN Anxiety  diphenhydrAMINE Injectable 25 milliGRAM(s) IV Push every 6 hours PRN Rash and/or Itching  LORazepam   Injectable 2 milliGRAM(s) IV Push once PRN Anxiety  morphine  - Injectable 2 milliGRAM(s) IV Push every 4 hours PRN BREAKTHROUGH PAIN  traMADol 25 milliGRAM(s) Oral every 6 hours PRN Moderate Pain (4 - 6)  traMADol 50 milliGRAM(s) Oral every 6 hours PRN Severe Pain (7 - 10)

## 2023-08-03 LAB
AMPHIPHYSIN AB TITR CSF: NEGATIVE — SIGNIFICANT CHANGE UP
CV2 IGG TITR CSF: NEGATIVE — SIGNIFICANT CHANGE UP
GLIAL NUC TYPE 1 AB TITR CSF: NEGATIVE — SIGNIFICANT CHANGE UP
HU1 AB TITR CSF IF: NEGATIVE — SIGNIFICANT CHANGE UP
HU2 AB TITR CSF IF: NEGATIVE — SIGNIFICANT CHANGE UP
HU3 AB TITR CSF: NEGATIVE — SIGNIFICANT CHANGE UP
PARANEOPLASTIC INTERPRETATION, CSF: SIGNIFICANT CHANGE UP
PCA-TR AB TITR CSF: NEGATIVE — SIGNIFICANT CHANGE UP
SURGICAL PATHOLOGY STUDY: SIGNIFICANT CHANGE UP

## 2023-08-03 PROCEDURE — 99233 SBSQ HOSP IP/OBS HIGH 50: CPT | Mod: GC

## 2023-08-03 RX ORDER — DULOXETINE HYDROCHLORIDE 30 MG/1
60 CAPSULE, DELAYED RELEASE ORAL AT BEDTIME
Refills: 0 | Status: DISCONTINUED | OUTPATIENT
Start: 2023-08-03 | End: 2023-08-25

## 2023-08-03 RX ADMIN — AMLODIPINE BESYLATE 5 MILLIGRAM(S): 2.5 TABLET ORAL at 06:59

## 2023-08-03 RX ADMIN — METHOCARBAMOL 750 MILLIGRAM(S): 500 TABLET, FILM COATED ORAL at 22:09

## 2023-08-03 RX ADMIN — Medication 1 DROP(S): at 18:08

## 2023-08-03 RX ADMIN — METHOCARBAMOL 750 MILLIGRAM(S): 500 TABLET, FILM COATED ORAL at 15:36

## 2023-08-03 RX ADMIN — METHOCARBAMOL 750 MILLIGRAM(S): 500 TABLET, FILM COATED ORAL at 06:59

## 2023-08-03 RX ADMIN — DULOXETINE HYDROCHLORIDE 60 MILLIGRAM(S): 30 CAPSULE, DELAYED RELEASE ORAL at 22:09

## 2023-08-03 RX ADMIN — TRAMADOL HYDROCHLORIDE 50 MILLIGRAM(S): 50 TABLET ORAL at 00:29

## 2023-08-03 RX ADMIN — Medication 200 MILLIGRAM(S): at 07:01

## 2023-08-03 RX ADMIN — Medication 1 DROP(S): at 12:41

## 2023-08-03 RX ADMIN — Medication 200 MILLIGRAM(S): at 18:10

## 2023-08-03 RX ADMIN — POLYETHYLENE GLYCOL 3350 17 GRAM(S): 17 POWDER, FOR SOLUTION ORAL at 12:41

## 2023-08-03 NOTE — PROGRESS NOTE ADULT - ATTENDING COMMENTS
43F with PMH of sickle cell trait, asthma, peripheral neuropathy who presents s/p fall with ongoing weakness/paresthesia. Neuro on board. Further workup in progress to eval for other neuro culprits. MRI H/spine showed no acute pathologies. LP done. CSF studies does not indicate any neuro-related culprits. Hospital course notable for questionable seizure-like activities - EEG showed no evidence of seizures. CT chest w/o contrast showed no acute pulmonary process. ECHO also unrevealing.  consult noted. Medical/neuro workup ongoing - no further  recs at this time. Fat pad bx completed on 8/1.     Pt seen at bedside. Overnight episode of twitching/spasms overnight. Again nearly total body involvement. She reports tolerating PO. No bowel/urinary complaints. Exam unchanged. No labs.    #LE weakness, paresthesia, r/o somatic disorder  #Transaminitis - prob fatty liver    -Neuro recs noted. Now s/p fat pad bx - results pending.  -CSF studies still pending- oligoclonal bands. Fat pad bx in lab. Paraneoplastic panel was neg.  -Pain control issues noted - cont duloxetine 60. Can continue lyrica, lido patch and methocarbamol (switched from baclofen).  Cont tramadol/morphine PRN.  -Meeting with neuro at bedside today w/ feedback from movement disorder specialist.  -Would benefit from further  follow up. Again discussed potential role for  assessment - pt again states that she is receiving counseling via remote/telecare. However, she is confident that her current ailment is not related to mental health issues.   -In agreement w/ acute rehab. Dispo planning.  -Holistic RN referral    Rest of plan as above. Await final input from neuro team. DC to rehab once confirmed no additional workup planned.

## 2023-08-03 NOTE — CHART NOTE - NSCHARTNOTEFT_GEN_A_CORE
Meeting was held today with Neurology, Internal medicine ( ),  and RN team with the patient.   Patient complained of b/l leg movements which patient states happened after LP under fluoroscopy with Neuroradiology last week.   Multiple Neurologists have been on case and have reviewed lab work, imaging and videos for the twitching.   Workup so far has been essentially non-indicative of any organic cause for these movements.   This was explained to the patient extensively.   Patient still does not agree with our input based on the extensive workup that has been completed.   There is no further inpatient Neurological workup.   Recommend psych evaluation to help with current symptoms    Patient is planned for Acute rehab.     Case was seen with Dr. Ward.

## 2023-08-03 NOTE — PROGRESS NOTE ADULT - SUBJECTIVE AND OBJECTIVE BOX
************************  Toño Wilson MD-PhD  Internal Medicine PGY-2  ************************    Patient is a 43y old  Female who presents with a chief complaint of Weakness in lower and upper extremities (02 Aug 2023 10:30)    No events overnight, no acute complaints this morning. Patient with appropriate PO intake and urinating well with BM(s). Denies CP, SOB, fevers/chills, N/V, or abdominal pain. Patient reminded of ongoing careplan.    MEDICATIONS  (STANDING):  amLODIPine   Tablet 5 milliGRAM(s) Oral daily  artificial  tears Solution 1 Drop(s) Both EYES four times a day  DULoxetine 60 milliGRAM(s) Oral daily  lidocaine   4% Patch 1 Patch Transdermal daily  methocarbamol 750 milliGRAM(s) Oral three times a day  nystatin/triamcinolone Ointment 1 Application(s) Topical two times a day  polyethylene glycol 3350 17 Gram(s) Oral daily  pregabalin 200 milliGRAM(s) Oral two times a day    MEDICATIONS  (PRN):  diphenhydrAMINE 50 milliGRAM(s) Oral once PRN Anxiety  diphenhydrAMINE Injectable 25 milliGRAM(s) IV Push every 6 hours PRN Rash and/or Itching  LORazepam   Injectable 2 milliGRAM(s) IV Push once PRN Anxiety  morphine  - Injectable 2 milliGRAM(s) IV Push every 4 hours PRN BREAKTHROUGH PAIN  traMADol 25 milliGRAM(s) Oral every 6 hours PRN Moderate Pain (4 - 6)  traMADol 50 milliGRAM(s) Oral every 6 hours PRN Severe Pain (7 - 10)      CAPILLARY BLOOD GLUCOSE        I&O's Summary    01 Aug 2023 07:01  -  02 Aug 2023 07:00  --------------------------------------------------------  IN: 100 mL / OUT: 0 mL / NET: 100 mL        PHYSICAL EXAM:  Vital Signs Last 24 Hrs  T(C): 36.9 (03 Aug 2023 06:00), Max: 37 (02 Aug 2023 21:28)  T(F): 98.5 (03 Aug 2023 06:00), Max: 98.6 (02 Aug 2023 21:28)  HR: 72 (03 Aug 2023 06:00) (72 - 90)  BP: 115/70 (03 Aug 2023 06:00) (102/55 - 117/72)  BP(mean): --  RR: 18 (03 Aug 2023 06:00) (17 - 18)  SpO2: 96% (03 Aug 2023 06:00) (95% - 96%)    Parameters below as of 03 Aug 2023 06:00  Patient On (Oxygen Delivery Method): room air        T(C): 36.9 (08-03-23 @ 06:00), Max: 37 (08-02-23 @ 21:28)  HR: 72 (08-03-23 @ 06:00) (72 - 90)  BP: 115/70 (08-03-23 @ 06:00) (102/55 - 117/72)  RR: 18 (08-03-23 @ 06:00) (17 - 18)  SpO2: 96% (08-03-23 @ 06:00) (95% - 96%)    CONSTITUTIONAL: NAD, well-developed  HEET: MMM, EOMI, PERRLA  NECK: supple  RESPIRATORY: Normal respiratory effort; lungs are clear to auscultation bilaterally  CARDIOVASCULAR: Regular rate and rhythm, normal S1 and S2, no murmur/rub/gallop; No lower extremity edema; Peripheral pulses are 2+ bilaterally  ABDOMEN: Nontender to palpation, normoactive bowel sounds, no rebound/guarding; No hepatosplenomegaly  MUSCULOSKELETAL: no clubbing or cyanosis of digits; no joint swelling or tenderness to palpation  NEURO: 5/5 strength in upper and lower extremities b/l on flexion and extension. Diminished sensation in lower extremities  PSYCH: A+O to person, place, and time; affect appropriate  SKIN: No rash    LABS:                      IMAGING & OTHER TESTS:  NNI.   ************************  Toño Wilson MD-PhD  Internal Medicine PGY-2  ************************    Patient is a 43y old  Female who presents with a chief complaint of Weakness in lower and upper extremities (02 Aug 2023 10:30)    Reports overnight had worsening of previous spasm pains/whole body contractions. She understands these are not seizures and is hopeful her current medication regimen will provide additional relief. No additional acute complaints this morning. Patient with appropriate PO intake and urinating well with BM(s). Denies CP, SOB, fevers/chills, N/V, or abdominal pain. Patient reminded of ongoing careplan.    MEDICATIONS  (STANDING):  amLODIPine   Tablet 5 milliGRAM(s) Oral daily  artificial  tears Solution 1 Drop(s) Both EYES four times a day  DULoxetine 60 milliGRAM(s) Oral daily  lidocaine   4% Patch 1 Patch Transdermal daily  methocarbamol 750 milliGRAM(s) Oral three times a day  nystatin/triamcinolone Ointment 1 Application(s) Topical two times a day  polyethylene glycol 3350 17 Gram(s) Oral daily  pregabalin 200 milliGRAM(s) Oral two times a day    MEDICATIONS  (PRN):  diphenhydrAMINE 50 milliGRAM(s) Oral once PRN Anxiety  diphenhydrAMINE Injectable 25 milliGRAM(s) IV Push every 6 hours PRN Rash and/or Itching  LORazepam   Injectable 2 milliGRAM(s) IV Push once PRN Anxiety  morphine  - Injectable 2 milliGRAM(s) IV Push every 4 hours PRN BREAKTHROUGH PAIN  traMADol 25 milliGRAM(s) Oral every 6 hours PRN Moderate Pain (4 - 6)  traMADol 50 milliGRAM(s) Oral every 6 hours PRN Severe Pain (7 - 10)      CAPILLARY BLOOD GLUCOSE        I&O's Summary    01 Aug 2023 07:01  -  02 Aug 2023 07:00  --------------------------------------------------------  IN: 100 mL / OUT: 0 mL / NET: 100 mL        PHYSICAL EXAM:  Vital Signs Last 24 Hrs  T(C): 36.9 (03 Aug 2023 06:00), Max: 37 (02 Aug 2023 21:28)  T(F): 98.5 (03 Aug 2023 06:00), Max: 98.6 (02 Aug 2023 21:28)  HR: 72 (03 Aug 2023 06:00) (72 - 90)  BP: 115/70 (03 Aug 2023 06:00) (102/55 - 117/72)  BP(mean): --  RR: 18 (03 Aug 2023 06:00) (17 - 18)  SpO2: 96% (03 Aug 2023 06:00) (95% - 96%)    Parameters below as of 03 Aug 2023 06:00  Patient On (Oxygen Delivery Method): room air        T(C): 36.9 (08-03-23 @ 06:00), Max: 37 (08-02-23 @ 21:28)  HR: 72 (08-03-23 @ 06:00) (72 - 90)  BP: 115/70 (08-03-23 @ 06:00) (102/55 - 117/72)  RR: 18 (08-03-23 @ 06:00) (17 - 18)  SpO2: 96% (08-03-23 @ 06:00) (95% - 96%)    CONSTITUTIONAL: NAD, well-developed  HEET: MMM, EOMI, PERRLA  NECK: supple  RESPIRATORY: Normal respiratory effort; lungs are clear to auscultation bilaterally  CARDIOVASCULAR: Regular rate and rhythm, normal S1 and S2, no murmur/rub/gallop; No lower extremity edema; Peripheral pulses are 2+ bilaterally  ABDOMEN: Nontender to palpation, normoactive bowel sounds, no rebound/guarding; No hepatosplenomegaly  MUSCULOSKELETAL: no clubbing or cyanosis of digits; no joint swelling or tenderness to palpation  NEURO: 5/5 strength in upper and lower extremities b/l on flexion and extension. Diminished sensation in lower extremities bilaterally  PSYCH: A+O to person, place, and time; affect appropriate  SKIN: No rash    LABS:                      IMAGING & OTHER TESTS:  NNI.

## 2023-08-03 NOTE — PROGRESS NOTE ADULT - PROBLEM SELECTOR PLAN 1
Ascending weakness and paresthesias, beginning in 3/2023  Possible CIDP vs Guillan [place] vs transmyelitis   EMG: No electrophysiologic evidence of a motor polyradiculoneuropathy - findings are not consistent with a diagnosis of CIDP  MRI head/cervical/thoracic: Mid cervical degenerative disc disease, C4-C5 broad irregular right central and C5-C6 focal right foraminal disc protrusion (disc herniation) that cause ventral cord deformity. Not consistent with clinical presentation per neurosurgery    Plan:  - Obtain 2nd opinion from Dr. Tom Moore   - LP under sedation performed 7/27; CSF studies have been unremarkable so far  - MRI under sedation completed 7/26/23- unremarkable findings   - Also started on robaxin 750mg TID and motrin 600mg and duloxetine 30mg for additional pain control  - Neurology following; appreciate recs  -EEG, TTE normal, CT chest normal   - fatpad biopsy done 8/; f/u path results  - f/u transthyretin, oligoclonal bands csf, paraneoplastic studies   -After psych consult, does not appear pt is amenable to psych interventions

## 2023-08-04 PROCEDURE — 95718 EEG PHYS/QHP 2-12 HR W/VEEG: CPT

## 2023-08-04 PROCEDURE — 99232 SBSQ HOSP IP/OBS MODERATE 35: CPT | Mod: GC

## 2023-08-04 RX ADMIN — Medication 200 MILLIGRAM(S): at 17:54

## 2023-08-04 RX ADMIN — METHOCARBAMOL 750 MILLIGRAM(S): 500 TABLET, FILM COATED ORAL at 05:56

## 2023-08-04 RX ADMIN — METHOCARBAMOL 750 MILLIGRAM(S): 500 TABLET, FILM COATED ORAL at 15:11

## 2023-08-04 RX ADMIN — AMLODIPINE BESYLATE 5 MILLIGRAM(S): 2.5 TABLET ORAL at 05:53

## 2023-08-04 RX ADMIN — Medication 200 MILLIGRAM(S): at 05:52

## 2023-08-04 RX ADMIN — DULOXETINE HYDROCHLORIDE 60 MILLIGRAM(S): 30 CAPSULE, DELAYED RELEASE ORAL at 22:29

## 2023-08-04 RX ADMIN — METHOCARBAMOL 750 MILLIGRAM(S): 500 TABLET, FILM COATED ORAL at 22:29

## 2023-08-04 RX ADMIN — Medication 1 APPLICATION(S): at 05:56

## 2023-08-04 NOTE — PROGRESS NOTE ADULT - SUBJECTIVE AND OBJECTIVE BOX
PROGRESS NOTE:   Authored by Dr. Bharat Venegas MD (PGY-1).     Patient is a 43y old  Female who presents with a chief complaint of Weakness in lower and upper extremities (03 Aug 2023 06:48)      SUBJECTIVE / OVERNIGHT EVENTS:  No acute events overnight. She has no acute complaints this morning.     MEDICATIONS  (STANDING):  amLODIPine   Tablet 5 milliGRAM(s) Oral daily  artificial  tears Solution 1 Drop(s) Both EYES four times a day  DULoxetine 60 milliGRAM(s) Oral at bedtime  lidocaine   4% Patch 1 Patch Transdermal daily  methocarbamol 750 milliGRAM(s) Oral three times a day  nystatin/triamcinolone Ointment 1 Application(s) Topical two times a day  polyethylene glycol 3350 17 Gram(s) Oral daily  pregabalin 200 milliGRAM(s) Oral two times a day    MEDICATIONS  (PRN):  diphenhydrAMINE 50 milliGRAM(s) Oral once PRN Anxiety  diphenhydrAMINE Injectable 25 milliGRAM(s) IV Push every 6 hours PRN Rash and/or Itching  LORazepam   Injectable 2 milliGRAM(s) IV Push once PRN Anxiety  morphine  - Injectable 2 milliGRAM(s) IV Push every 4 hours PRN BREAKTHROUGH PAIN  traMADol 25 milliGRAM(s) Oral every 6 hours PRN Moderate Pain (4 - 6)  traMADol 50 milliGRAM(s) Oral every 6 hours PRN Severe Pain (7 - 10)      CAPILLARY BLOOD GLUCOSE        I&O's Summary      PHYSICAL EXAM:  Vital Signs Last 24 Hrs  T(C): 36.4 (04 Aug 2023 05:37), Max: 37 (03 Aug 2023 13:00)  T(F): 97.6 (04 Aug 2023 05:37), Max: 98.6 (03 Aug 2023 13:00)  HR: 84 (04 Aug 2023 05:37) (83 - 94)  BP: 119/62 (04 Aug 2023 05:37) (117/77 - 119/62)  BP(mean): --  RR: 17 (04 Aug 2023 05:37) (17 - 17)  SpO2: 98% (04 Aug 2023 05:37) (95% - 98%)    Parameters below as of 04 Aug 2023 05:37  Patient On (Oxygen Delivery Method): room air        CONSTITUTIONAL: NAD, well-developed  HEET: MMM, EOMI, PERRLA  NECK: supple  RESPIRATORY: Normal respiratory effort; lungs are clear to auscultation bilaterally  CARDIOVASCULAR: Regular rate and rhythm, normal S1 and S2, no murmur/rub/gallop; No lower extremity edema; Peripheral pulses are 2+ bilaterally  ABDOMEN: Nontender to palpation, normoactive bowel sounds, no rebound/guarding; No hepatosplenomegaly  MUSCULOSKELETAL: no clubbing or cyanosis of digits; no joint swelling or tenderness to palpation  NEURO: 5/5 strength in upper and lower extremities b/l on flexion and extension. Diminished sensation in lower extremities  PSYCH: A+O to person, place, and time; affect appropriate  SKIN: No rash    LABS:                      Tele Reviewed:    RADIOLOGY & ADDITIONAL TESTS:  Results Reviewed:   Imaging Personally Reviewed:  Electrocardiogram Personally Reviewed:

## 2023-08-04 NOTE — CHART NOTE - NSCHARTNOTEFT_GEN_A_CORE
EEG preliminary read (not final) on the initial recording hour(s) = 1 hr 48 min  Reviewed from: 13:36-15:26    Normal awake video-EEG.  No epileptiform abnormalities.    Final report to follow tomorrow morning after completion of study.    [facility] EEG Reading Room Ph#: [phone]  Epilepsy Answering Service after 5PM and before 8:30AM: Ph#: [phone]

## 2023-08-04 NOTE — PROGRESS NOTE ADULT - PROBLEM SELECTOR PLAN 1
Ascending weakness and paresthesias, beginning in 3/2023  Possible CIDP vs Guillan [place] vs transmyelitis   EMG: No electrophysiologic evidence of a motor polyradiculoneuropathy - findings are not consistent with a diagnosis of CIDP  MRI head/cervical/thoracic: Mid cervical degenerative disc disease, C4-C5 broad irregular right central and C5-C6 focal right foraminal disc protrusion (disc herniation) that cause ventral cord deformity. Not consistent with clinical presentation per neurosurgery    Plan:  - LP under sedation performed 7/27; CSF studies have been unremarkable so far  - MRI under sedation completed 7/26/23- unremarkable findings   - Also started on robaxin 750mg TID and motrin 600mg and duloxetine 30mg for additional pain control  - Neurology following; appreciate recs  - Dr. Moore not interested in providing 2nd opinion   -EEG, TTE normal, CT chest normal   - fatpad biopsy done 8/1; path results did not show amyloidosis   - f/u transthyretin, oligoclonal bands csf, paraneoplastic studies   -After psych consult, does not appear pt is amenable to psych interventions

## 2023-08-04 NOTE — PROGRESS NOTE ADULT - ATTENDING COMMENTS
43F with PMH of sickle cell trait, asthma, peripheral neuropathy who presents s/p fall with ongoing weakness/paresthesia. Neuro on board. Further workup in progress to eval for other neuro culprits. MRI H/spine showed no acute pathologies. LP done. CSF studies does not indicate any neuro-related culprits. Hospital course notable for questionable seizure-like activities - EEG showed no evidence of seizures. ECHO also unrevealing. Fat pad bx not suggestive of amyloidosis. Paraneoplastic panel negative. CT CAP showing no occult lesions to raise concern for malignancy.  consult noted. Medical/neuro workup essentially completed. At this point - concern for possible conversion disorder. Multiple bedside team meeting with neurology/medicine. Pt states sx worse since LP and believes she has new symptoms since then (mouth/lip tightness, unable to talk) and now no longer able to suppress her spasms as she had before. Given thorough negative work up -  was recalled.     Pt seen at bedside. Again reporting spasms and jaw tightness (preventing her from speaking and causing breathing issues) overnight. Exam unchanged.    #LE weakness, paresthesia, r/o somatic disorder, conversion  #Transaminitis - prob fatty liver    -Neuro recs appreciated. Thorough discussion with team -> no further neuro workup. Do not believe pts are attributable to any neurologic causes.  -Pt perseverates on spastic sx. States symptoms not suppressible ever since LP. This does not correspond to history from chart review as pt has been having issues with "suppressing" her erratic movements even prior to LP. This observation was made my medical team and also by procedure team, who attempted bedside LP (can refer to their note). additional episodes have since occurred and have been witnessed by medicine/neuro multiple times. Activity was shared w/ movement disorder specialist and multiple neuro specialist as per neurology team - no causes can be identified based on videos of movement activities and current diagnostics.. As such - medical/neurological workup is complete.  -We will order an EEG for tonight as pt reports episodes at night to capture episode. If this study returns negative - it was made clear to pt that there will be no additional tests planned. She was in agreement to be seen by . Spoke with  - they will see pt likely on Monday. Holistic RN also c/s for further assistance and support for patient.   -Continue duloxetine 60 daily.   -Cont lyrica and methocarbamol for symptomatic management - although benefit unclear at this time. If signs she is not tolerating or developing adverse effect - would consider stopping.     Rest of plan as above. DC planning for acute rehab already started. Will await final  assessment. [facility] requested letter for shelter showing hospitalizatoin -- provided to LIBBY.

## 2023-08-05 PROCEDURE — 95718 EEG PHYS/QHP 2-12 HR W/VEEG: CPT

## 2023-08-05 PROCEDURE — 99233 SBSQ HOSP IP/OBS HIGH 50: CPT | Mod: GC

## 2023-08-05 RX ADMIN — DULOXETINE HYDROCHLORIDE 60 MILLIGRAM(S): 30 CAPSULE, DELAYED RELEASE ORAL at 21:30

## 2023-08-05 RX ADMIN — Medication 200 MILLIGRAM(S): at 18:46

## 2023-08-05 RX ADMIN — Medication 1 DROP(S): at 19:03

## 2023-08-05 RX ADMIN — Medication 200 MILLIGRAM(S): at 05:35

## 2023-08-05 RX ADMIN — AMLODIPINE BESYLATE 5 MILLIGRAM(S): 2.5 TABLET ORAL at 05:35

## 2023-08-05 RX ADMIN — METHOCARBAMOL 750 MILLIGRAM(S): 500 TABLET, FILM COATED ORAL at 13:47

## 2023-08-05 RX ADMIN — METHOCARBAMOL 750 MILLIGRAM(S): 500 TABLET, FILM COATED ORAL at 05:35

## 2023-08-05 RX ADMIN — METHOCARBAMOL 750 MILLIGRAM(S): 500 TABLET, FILM COATED ORAL at 21:30

## 2023-08-05 RX ADMIN — Medication 1 APPLICATION(S): at 19:03

## 2023-08-05 NOTE — PROGRESS NOTE ADULT - PROBLEM SELECTOR PLAN 1
Ascending weakness and paresthesias, beginning in 3/2023  Possible CIDP vs Guillan [place] vs transmyelitis   EMG: No electrophysiologic evidence of a motor polyradiculoneuropathy - findings are not consistent with a diagnosis of CIDP  MRI head/cervical/thoracic: Mid cervical degenerative disc disease, C4-C5 broad irregular right central and C5-C6 focal right foraminal disc protrusion (disc herniation) that cause ventral cord deformity. Not consistent with clinical presentation per neurosurgery    Plan:  - LP under sedation performed 7/27; CSF studies have been unremarkable so far  - MRI under sedation completed 7/26/23- unremarkable findings   - Also started on robaxin 750mg TID and motrin 600mg and duloxetine 60mg for additional pain control  - Neurology following; appreciate recs  - Dr. Moore not interested in providing 2nd opinion   -EEG, TTE normal, CT chest normal   - fatpad biopsy done 8/1; path results did not show amyloidosis   - f/u transthyretin, oligoclonal bands csf, paraneoplastic studies   -  follow up on Monday

## 2023-08-05 NOTE — PROGRESS NOTE ADULT - ATTENDING COMMENTS
43F with PMH of sickle cell trait, asthma, peripheral neuropathy who presents s/p fall with ongoing weakness/paresthesia.  MRI H/spine showed no acute pathologies. LP done. CSF studies does not indicate any neuro-related culprits.   Hospital course notable for questionable seizure-like activities  - EEG showed no evidence of seizures  - ECHO also unrevealing  -Fat pad bx not suggestive of amyloidosis. Paraneoplastic panel negative.   -CT CAP showing no occult lesions to raise concern for malignancy.   - Medical/neuro workup essentially completed. At this point - concern for possible conversion disorder. Multiple bedside team meeting with neurology/medicine. Pt states sx worse since LP and believes she has new symptoms since then (mouth/lip tightness, unable to talk) and now no longer able to suppress her spasms as she had before.   Neuro recs appreciated. Thorough discussion with team -> no further neuro workup. Do not believe pts are attributable to any neurologic causes.  -Pt perseverates on spastic sx. States symptoms not suppressible ever since LP. This does not correspond to history from chart review as pt has been having issues with "suppressing" her erratic movements even prior to LP. This observation was made my medical team and also by procedure team, who attempted bedside LP (can refer to their note). additional episodes have since occurred and have been witnessed by medicine/neuro multiple times. Activity was shared w/ movement disorder specialist and multiple neuro specialist as per neurology team - no causes can be identified based on videos of movement activities and current diagnostics.. As such - medical/neurological workup is complete.  -Overnight EEG- Normal video-EEG. The captured numerous clinical events are not epileptic in nature. If this study returns negative - it was made clear to pt that there will be no additional tests planned.   - She was in agreement to be seen by . Spoke with  - they will see pt likely on Monday. Holistic RN also c/s for further assistance and support for patient.   -Continue duloxetine 60 daily.   -Cont lyrica and methocarbamol for symptomatic management - although benefit unclear at this time. If signs she is not tolerating or developing adverse effect - would consider stopping.   - Abnormal LFTS- likely secondary to  Hepatic steatosis and multiple indeterminant hypoechoic liver lesions. A CT scan or MRI utilizing liver mass protocol is recommended.  -DC planning for acute rehab. FU final  assessment.   -LIBBY requested letter for shelter showing hospitalization -- provided to LIBBY.

## 2023-08-05 NOTE — PROGRESS NOTE ADULT - SUBJECTIVE AND OBJECTIVE BOX
PROGRESS NOTE:   Authored by Dr. Bharat Venegas MD (PGY-1).     Patient is a 43y old  Female who presents with a chief complaint of Weakness in lower and upper extremities (04 Aug 2023 09:03)      SUBJECTIVE / OVERNIGHT EVENTS:  No acute events overnight. She has no acute complaints this morning.     MEDICATIONS  (STANDING):  amLODIPine   Tablet 5 milliGRAM(s) Oral daily  artificial  tears Solution 1 Drop(s) Both EYES four times a day  DULoxetine 60 milliGRAM(s) Oral at bedtime  lidocaine   4% Patch 1 Patch Transdermal daily  methocarbamol 750 milliGRAM(s) Oral three times a day  nystatin/triamcinolone Ointment 1 Application(s) Topical two times a day  polyethylene glycol 3350 17 Gram(s) Oral daily  pregabalin 200 milliGRAM(s) Oral two times a day    MEDICATIONS  (PRN):  diphenhydrAMINE 50 milliGRAM(s) Oral once PRN Anxiety  diphenhydrAMINE Injectable 25 milliGRAM(s) IV Push every 6 hours PRN Rash and/or Itching  LORazepam   Injectable 2 milliGRAM(s) IV Push once PRN Anxiety  morphine  - Injectable 2 milliGRAM(s) IV Push every 4 hours PRN BREAKTHROUGH PAIN  traMADol 25 milliGRAM(s) Oral every 6 hours PRN Moderate Pain (4 - 6)  traMADol 50 milliGRAM(s) Oral every 6 hours PRN Severe Pain (7 - 10)      CAPILLARY BLOOD GLUCOSE        I&O's Summary      PHYSICAL EXAM:  Vital Signs Last 24 Hrs  T(C): 36.7 (05 Aug 2023 05:30), Max: 36.7 (04 Aug 2023 14:08)  T(F): 98.1 (05 Aug 2023 05:30), Max: 98.1 (04 Aug 2023 14:08)  HR: 87 (05 Aug 2023 05:30) (80 - 87)  BP: 103/67 (05 Aug 2023 05:30) (103/67 - 123/77)  BP(mean): --  RR: 17 (05 Aug 2023 05:30) (16 - 18)  SpO2: 98% (05 Aug 2023 05:30) (93% - 98%)    Parameters below as of 05 Aug 2023 05:30  Patient On (Oxygen Delivery Method): room air        CONSTITUTIONAL: NAD, well-developed  HEET: MMM, EOMI, PERRLA  NECK: supple  RESPIRATORY: Normal respiratory effort; lungs are clear to auscultation bilaterally  CARDIOVASCULAR: Regular rate and rhythm, normal S1 and S2, no murmur/rub/gallop; No lower extremity edema; Peripheral pulses are 2+ bilaterally  ABDOMEN: Nontender to palpation, normoactive bowel sounds, no rebound/guarding; No hepatosplenomegaly  MUSCULOSKELETAL: no clubbing or cyanosis of digits; no joint swelling or tenderness to palpation  NEURO: 5/5 strength in upper and lower extremities b/l on flexion and extension. Diminished sensation in lower extremities  PSYCH: A+O to person, place, and time; affect appropriate  SKIN: No rash    LABS:                      Tele Reviewed:    RADIOLOGY & ADDITIONAL TESTS:  Results Reviewed:   Imaging Personally Reviewed:  Electrocardiogram Personally Reviewed:

## 2023-08-05 NOTE — EEG REPORT - NS EEG TEXT BOX
KAILASH FUENTES [facility]-[id number]     Study Date: 08/05/2023 18:23 - 08/05/23 08:00 AM  Duration: 18 hr 23 min  --------------------------------------------------------------------------------------------------  History:  CC/ HPI Patient is a 43y old  Female who presents with a chief complaint of Weakness in lower and upper extremities (28 Jul 2023 14:53)    MEDICATIONS  (STANDING):  amLODIPine   Tablet 5 milliGRAM(s) Oral daily  artificial  tears Solution 1 Drop(s) Both EYES four times a day  baclofen 10 milliGRAM(s) Oral every 8 hours  DULoxetine 30 milliGRAM(s) Oral at bedtime  enoxaparin Injectable 40 milliGRAM(s) SubCutaneous every 12 hours  lidocaine   4% Patch 1 Patch Transdermal daily  nystatin/triamcinolone Ointment 1 Application(s) Topical two times a day  polyethylene glycol 3350 17 Gram(s) Oral daily  pregabalin 200 milliGRAM(s) Oral two times a day    --------------------------------------------------------------------------------------------------  Study Interpretation:    [[[Abbreviation Key:  PDR=alpha rhythm/posterior dominant rhythm. A-P=anterior posterior.  Amplitude: ‘very low’:<20; ‘low’:20-49; ‘medium’:; ‘high’:>150uV.  Persistence for periodic/rhythmic patterns (% of epoch) ‘rare’:<1%; ‘occasional’:1-10%; ‘frequent’:10-50%; ‘abundant’:50-90%; ‘continuous’:>90%.  Persistence for sporadic discharges: ‘rare’:<1/hr; ‘occasional’:1/min-1/hr; ‘frequent’:>1/min; ‘abundant’:>1/10 sec.  RPP=rhythmic and periodic patterns; GRDA=generalized rhythmic delta activity; FIRDA=frontal intermittent GRDA; LRDA=lateralized rhythmic delta activity; TIRDA=temporal intermittent rhythmic delta activity;  LPD=PLED=lateralized periodic discharges; GPD=generalized periodic discharges; BIPDs =bilateral independent periodic discharges; Mf=multifocal; SIRPDs=stimulus induced rhythmic, periodic, or ictal appearing discharges; BIRDs=brief potentially ictal rhythmic discharges >4 Hz, lasting .5-10s; PFA (paroxysmal bursts >13 Hz or =8 Hz <10s).  Modifiers: +F=with fast component; +S=with spike component; +R=with rhythmic component.  S-B=burst suppression pattern.  Max=maximal. N1-drowsy; N2-stage II sleep; N3-slow wave sleep. SSS/BETS=small sharp spikes/benign epileptiform transients of sleep. HV=hyperventilation; PS=photic stimulation]]]    Daily EEG Visual Analysis    FINDINGS:      Background:  Continuity: Continuous  Symmetry: Symmetric  Posterior dominant rhythm (PDR): 9-9.5 Hz, reactive to eye closure. Symmetric low-amplitude frontal beta in wakefulness.  State Change: Present  Voltage: Normal  Anterior Posterior Gradient: Present  Other background findings: None  Breach: Absent    Background Slowing:  Generalized slowing: None  Focal slowing: None    State Changes:   Drowsiness is characterized by fragmentation, attenuation, and slowing of the background activity.  Stage 2 sleep is characterized by symmetric K complexes and sleep spindles.     Interictal Findings:  None    Electrographic and Electroclinical seizures:  None    Other Clinical Events:  Numerous events between 02:27 AM and 04:08 AM with nonspecific movements of the trunk and various extremities with eyes closed often with left or rightward head turning and hip and shoulder thrust. EEG shows the patient's awake background during these times, with intermittent muscle and movement artifact. No correlating epileptiform activity was observed.    Activation Procedures:   Hyperventilation is not performed.    Photic stimulation is performed from 2-20 Hz and does not elicit any abnormalities.     Artifacts:  Intermittent myogenic and movement artifacts are present. Poor electrode impedance and intermittently disconnected electrodes at times limit interpretation.     EKG:  Single-lead EKG shows regular rhythm.    EEG Classification / Summary:  Normal EEG in the awake, drowsy, and asleep states.   -No focal or epileptiform abnormalities are captured.   -Numerous clinical events of nonspecific movements of the body and extremities without any EEG correlation for an epileptic event.    Clinical Impression:  Normal video-EEG.   The captured numerous clinical events are not epileptic in nature.    -------------------------------------------------------------------------------------------------------  [facility] EEG Reading Room Ph#: [phone]  Epilepsy Answering Service after 5PM and before 8:30AM: Ph#: [phone]    This is a preliminary report pending attending review and attestation.    Jaja Gamboa MD  Fellow, [facility]     KAILASH FUENTES [facility]-[id number]     Study Date: 08/04/2023 18:23 - 08/05/23 08:00 AM  Duration: 18 hr 23 min  --------------------------------------------------------------------------------------------------  History:  CC/ HPI Patient is a 43y old  Female who presents with a chief complaint of Weakness in lower and upper extremities (28 Jul 2023 14:53)    MEDICATIONS  (STANDING):  amLODIPine   Tablet 5 milliGRAM(s) Oral daily  artificial  tears Solution 1 Drop(s) Both EYES four times a day  baclofen 10 milliGRAM(s) Oral every 8 hours  DULoxetine 30 milliGRAM(s) Oral at bedtime  enoxaparin Injectable 40 milliGRAM(s) SubCutaneous every 12 hours  lidocaine   4% Patch 1 Patch Transdermal daily  nystatin/triamcinolone Ointment 1 Application(s) Topical two times a day  polyethylene glycol 3350 17 Gram(s) Oral daily  pregabalin 200 milliGRAM(s) Oral two times a day    --------------------------------------------------------------------------------------------------  Study Interpretation:    [[[Abbreviation Key:  PDR=alpha rhythm/posterior dominant rhythm. A-P=anterior posterior.  Amplitude: ‘very low’:<20; ‘low’:20-49; ‘medium’:; ‘high’:>150uV.  Persistence for periodic/rhythmic patterns (% of epoch) ‘rare’:<1%; ‘occasional’:1-10%; ‘frequent’:10-50%; ‘abundant’:50-90%; ‘continuous’:>90%.  Persistence for sporadic discharges: ‘rare’:<1/hr; ‘occasional’:1/min-1/hr; ‘frequent’:>1/min; ‘abundant’:>1/10 sec.  RPP=rhythmic and periodic patterns; GRDA=generalized rhythmic delta activity; FIRDA=frontal intermittent GRDA; LRDA=lateralized rhythmic delta activity; TIRDA=temporal intermittent rhythmic delta activity;  LPD=PLED=lateralized periodic discharges; GPD=generalized periodic discharges; BIPDs =bilateral independent periodic discharges; Mf=multifocal; SIRPDs=stimulus induced rhythmic, periodic, or ictal appearing discharges; BIRDs=brief potentially ictal rhythmic discharges >4 Hz, lasting .5-10s; PFA (paroxysmal bursts >13 Hz or =8 Hz <10s).  Modifiers: +F=with fast component; +S=with spike component; +R=with rhythmic component.  S-B=burst suppression pattern.  Max=maximal. N1-drowsy; N2-stage II sleep; N3-slow wave sleep. SSS/BETS=small sharp spikes/benign epileptiform transients of sleep. HV=hyperventilation; PS=photic stimulation]]]    Daily EEG Visual Analysis    FINDINGS:      Background:  Continuity: Continuous  Symmetry: Symmetric  Posterior dominant rhythm (PDR): 9-9.5 Hz, reactive to eye closure. Symmetric low-amplitude frontal beta in wakefulness.  State Change: Present  Voltage: Normal  Anterior Posterior Gradient: Present  Other background findings: None  Breach: Absent    Background Slowing:  Generalized slowing: None  Focal slowing: None    State Changes:   Drowsiness is characterized by fragmentation, attenuation, and slowing of the background activity.  Stage 2 sleep is characterized by symmetric K complexes and sleep spindles.     Interictal Findings:  None    Electrographic and Electroclinical seizures:  None    Other Clinical Events:  Numerous events between 02:27 AM and 04:08 AM with nonspecific movements of the trunk and various extremities with eyes closed often with left or rightward head turning and hip and shoulder thrust. EEG shows the patient's awake background during these times, with intermittent muscle and movement artifact. No correlating epileptiform activity was observed.    Activation Procedures:   Hyperventilation is not performed.    Photic stimulation is performed from 2-20 Hz and does not elicit any abnormalities.     Artifacts:  Intermittent myogenic and movement artifacts are present. Poor electrode impedance and intermittently disconnected electrodes at times limit interpretation.     EKG:  Single-lead EKG shows regular rhythm.    EEG Classification / Summary:  Normal EEG in the awake, drowsy, and asleep states.   -No focal or epileptiform abnormalities are captured.   -Numerous clinical events of nonspecific movements of the body and extremities without any EEG correlation for an epileptic event.    Clinical Impression:  Normal video-EEG.   The captured numerous clinical events are not epileptic in nature.    -------------------------------------------------------------------------------------------------------  [facility] EEG Reading Room Ph#: [phone]  Epilepsy Answering Service after 5PM and before 8:30AM: Ph#: [phone]    This is a preliminary report pending attending review and attestation.    Jaja Gamboa MD  Fellow, [facility]     KAILASH FUENTES [facility]-[id number]     Study Date: 08/04/2023 18:23 - 08/05/23 08:00 AM  Duration: 18 hr 23 min  --------------------------------------------------------------------------------------------------  History:  CC/ HPI Patient is a 43y old  Female who presents with a chief complaint of Weakness in lower and upper extremities (28 Jul 2023 14:53)    MEDICATIONS  (STANDING):  amLODIPine   Tablet 5 milliGRAM(s) Oral daily  artificial  tears Solution 1 Drop(s) Both EYES four times a day  baclofen 10 milliGRAM(s) Oral every 8 hours  DULoxetine 30 milliGRAM(s) Oral at bedtime  enoxaparin Injectable 40 milliGRAM(s) SubCutaneous every 12 hours  lidocaine   4% Patch 1 Patch Transdermal daily  nystatin/triamcinolone Ointment 1 Application(s) Topical two times a day  polyethylene glycol 3350 17 Gram(s) Oral daily  pregabalin 200 milliGRAM(s) Oral two times a day    --------------------------------------------------------------------------------------------------  Study Interpretation:    [[[Abbreviation Key:  PDR=alpha rhythm/posterior dominant rhythm. A-P=anterior posterior.  Amplitude: ‘very low’:<20; ‘low’:20-49; ‘medium’:; ‘high’:>150uV.  Persistence for periodic/rhythmic patterns (% of epoch) ‘rare’:<1%; ‘occasional’:1-10%; ‘frequent’:10-50%; ‘abundant’:50-90%; ‘continuous’:>90%.  Persistence for sporadic discharges: ‘rare’:<1/hr; ‘occasional’:1/min-1/hr; ‘frequent’:>1/min; ‘abundant’:>1/10 sec.  RPP=rhythmic and periodic patterns; GRDA=generalized rhythmic delta activity; FIRDA=frontal intermittent GRDA; LRDA=lateralized rhythmic delta activity; TIRDA=temporal intermittent rhythmic delta activity;  LPD=PLED=lateralized periodic discharges; GPD=generalized periodic discharges; BIPDs =bilateral independent periodic discharges; Mf=multifocal; SIRPDs=stimulus induced rhythmic, periodic, or ictal appearing discharges; BIRDs=brief potentially ictal rhythmic discharges >4 Hz, lasting .5-10s; PFA (paroxysmal bursts >13 Hz or =8 Hz <10s).  Modifiers: +F=with fast component; +S=with spike component; +R=with rhythmic component.  S-B=burst suppression pattern.  Max=maximal. N1-drowsy; N2-stage II sleep; N3-slow wave sleep. SSS/BETS=small sharp spikes/benign epileptiform transients of sleep. HV=hyperventilation; PS=photic stimulation]]]    Daily EEG Visual Analysis    FINDINGS:      Background:  Continuity: Continuous  Symmetry: Symmetric  Posterior dominant rhythm (PDR): 9-9.5 Hz, reactive to eye closure. Symmetric low-amplitude frontal beta in wakefulness.  State Change: Present  Voltage: Normal  Anterior Posterior Gradient: Present  Other background findings: None  Breach: Absent    Background Slowing:  Generalized slowing: None  Focal slowing: None    State Changes:   Drowsiness is characterized by fragmentation, attenuation, and slowing of the background activity.  Stage 2 sleep is characterized by symmetric K complexes and sleep spindles.     Interictal Findings:  None    Electrographic and Electroclinical seizures:  None    Other Clinical Events:  Numerous events between 02:27 AM and 04:08 AM with nonspecific movements of the trunk and various extremities with eyes closed often with left or rightward head turning and hip and shoulder thrust. EEG shows the patient's awake background during these times, with intermittent muscle and movement artifact. No correlating epileptiform activity was observed.    Activation Procedures:   Hyperventilation is not performed.    Photic stimulation is performed from 2-20 Hz and does not elicit any abnormalities.     Artifacts:  Intermittent myogenic and movement artifacts are present.   Poor electrode impedance and intermittently disconnected electrodes at times limit interpretation in latter portions.     EKG:  Single-lead EKG shows regular rhythm.    EEG Classification / Summary:  Normal EEG in the awake, drowsy, and asleep states.   -No focal or epileptiform abnormalities are captured.   -Numerous clinical events of nonspecific movements of the body and extremities without any EEG correlation for an epileptic event.    Clinical Impression:  Normal video-EEG.   The captured numerous clinical events appear nonepileptic in etiology.  Intermittently disconnected electrodes at times limit interpretation in latter portions    -------------------------------------------------------------------------------------------------------  [facility] EEG Reading Room Ph#: [phone]  Epilepsy Answering Service after 5PM and before 8:30AM: Ph#: [phone]      Jaja Gamboa MD  Fellow, [facility]

## 2023-08-06 PROCEDURE — 99232 SBSQ HOSP IP/OBS MODERATE 35: CPT | Mod: GC

## 2023-08-06 RX ORDER — ENOXAPARIN SODIUM 100 MG/ML
40 INJECTION SUBCUTANEOUS EVERY 12 HOURS
Refills: 0 | Status: DISCONTINUED | OUTPATIENT
Start: 2023-08-06 | End: 2023-08-25

## 2023-08-06 RX ADMIN — Medication 200 MILLIGRAM(S): at 18:05

## 2023-08-06 RX ADMIN — Medication 1 APPLICATION(S): at 18:05

## 2023-08-06 RX ADMIN — AMLODIPINE BESYLATE 5 MILLIGRAM(S): 2.5 TABLET ORAL at 05:20

## 2023-08-06 RX ADMIN — Medication 200 MILLIGRAM(S): at 05:19

## 2023-08-06 RX ADMIN — DULOXETINE HYDROCHLORIDE 60 MILLIGRAM(S): 30 CAPSULE, DELAYED RELEASE ORAL at 21:41

## 2023-08-06 RX ADMIN — METHOCARBAMOL 750 MILLIGRAM(S): 500 TABLET, FILM COATED ORAL at 21:42

## 2023-08-06 RX ADMIN — METHOCARBAMOL 750 MILLIGRAM(S): 500 TABLET, FILM COATED ORAL at 05:20

## 2023-08-06 RX ADMIN — ENOXAPARIN SODIUM 40 MILLIGRAM(S): 100 INJECTION SUBCUTANEOUS at 21:52

## 2023-08-06 RX ADMIN — METHOCARBAMOL 750 MILLIGRAM(S): 500 TABLET, FILM COATED ORAL at 14:46

## 2023-08-06 NOTE — PROGRESS NOTE ADULT - PROBLEM SELECTOR PLAN 1
Ascending weakness and paresthesias, beginning in 3/2023  Possible CIDP vs Guillan [place] vs transmyelitis   EMG: No electrophysiologic evidence of a motor polyradiculoneuropathy - findings are not consistent with a diagnosis of CIDP  MRI head/cervical/thoracic: Mid cervical degenerative disc disease, C4-C5 broad irregular right central and C5-C6 focal right foraminal disc protrusion (disc herniation) that cause ventral cord deformity. Not consistent with clinical presentation per neurosurgery    Plan:  - LP under sedation performed 7/27; CSF studies have been unremarkable so far  - MRI under sedation completed 7/26/23- unremarkable findings   - Also started on robaxin 750mg TID and motrin 600mg and duloxetine 60mg for additional pain control  - Neurology following; appreciate recs  - Dr. Moore not interested in providing 2nd opinion   -EEG, TTE normal, CT chest normal   - fatpad biopsy done 8/1; path results did not show amyloidosis   - f/u transthyretin, oligoclonal bands csf, paraneoplastic studies   -  follow up on Monday

## 2023-08-06 NOTE — PROGRESS NOTE ADULT - TIME BILLING
Time-based billing (NON-critical care).     38 minutes spent on total encounter; more than 50% of the visit was spent counseling and / or coordinating care by the attending physician.  The necessity of the time spent during the encounter on this date of service was due to:     documentation in [place], reviewing chart and coordinating care with patient/ACP and interdisciplinary staff (such as , social workers, etc) as well as reviewing vitals, laboratory data, radiology, medication list, consultants' recommendations and prior records. Interventions were performed as documented above.
The necessity of the time spent during the encounter on this date of service was due to:   - Direct patient care ( interview and independently obtaining a review of systems and performing a physical exam)and documentation  - Ordering, reviewing, and interpreting labs, testing, and imaging   - Reviewing prior hospitalization and where necessary, outpatient records.  - Discussion with consultants, other providers, support staff  - Counselling and educating patient and family regarding interpretation of aforementioned items and plan of care.
Time-based billing (NON-critical care).     50 minutes spent on total encounter; more than 50% of the visit was spent counseling and / or coordinating care by the attending physician.  The necessity of the time spent during the encounter on this date of service was due to:     documentation in [place], reviewing chart and coordinating care with patient/ACP and interdisciplinary staff (such as , social workers, etc) as well as reviewing vitals, laboratory data, radiology, medication list, consultants' recommendations and prior records. Interventions were performed as documented above.
The necessity of the time spent during the encounter on this date of service was due to:   - Direct patient care ( interview and independently obtaining a review of systems and performing a physical exam)and documentation  - Ordering, reviewing, and interpreting labs, testing, and imaging   - Reviewing prior hospitalization and where necessary, outpatient records.  - Discussion with consultants, other providers, support staff  - Counselling and educating patient and family regarding interpretation of aforementioned items and plan of care.
Time-based billing (NON-critical care).     50 minutes spent on total encounter; more than 50% of the visit was spent counseling and / or coordinating care by the attending physician.  The necessity of the time spent during the encounter on this date of service was due to:     documentation in [employer], reviewing chart and coordinating care with patient/ACP and interdisciplinary staff (such as , social workers, etc) as well as reviewing vitals, laboratory data, radiology, medication list, consultants' recommendations and prior records. Interventions were performed as documented above.

## 2023-08-06 NOTE — EEG REPORT - NS EEG TEXT BOX
KAILASH FUENTES [facility]-[id number]     Study Date: 08/05/2023 0800- 08/05/23 1538   Duration: 7 hr 14 MINS  --------------------------------------------------------------------------------------------------  History:  CC/ HPI Patient is a 43y old  Female who presents with a chief complaint of Weakness in lower and upper extremities (28 Jul 2023 14:53)    MEDICATIONS  (STANDING):  amLODIPine   Tablet 5 milliGRAM(s) Oral daily  artificial  tears Solution 1 Drop(s) Both EYES four times a day  baclofen 10 milliGRAM(s) Oral every 8 hours  DULoxetine 30 milliGRAM(s) Oral at bedtime  enoxaparin Injectable 40 milliGRAM(s) SubCutaneous every 12 hours  lidocaine   4% Patch 1 Patch Transdermal daily  nystatin/triamcinolone Ointment 1 Application(s) Topical two times a day  polyethylene glycol 3350 17 Gram(s) Oral daily  pregabalin 200 milliGRAM(s) Oral two times a day    --------------------------------------------------------------------------------------------------  Study Interpretation:    [[[Abbreviation Key:  PDR=alpha rhythm/posterior dominant rhythm. A-P=anterior posterior.  Amplitude: ‘very low’:<20; ‘low’:20-49; ‘medium’:; ‘high’:>150uV.  Persistence for periodic/rhythmic patterns (% of epoch) ‘rare’:<1%; ‘occasional’:1-10%; ‘frequent’:10-50%; ‘abundant’:50-90%; ‘continuous’:>90%.  Persistence for sporadic discharges: ‘rare’:<1/hr; ‘occasional’:1/min-1/hr; ‘frequent’:>1/min; ‘abundant’:>1/10 sec.  RPP=rhythmic and periodic patterns; GRDA=generalized rhythmic delta activity; FIRDA=frontal intermittent GRDA; LRDA=lateralized rhythmic delta activity; TIRDA=temporal intermittent rhythmic delta activity;  LPD=PLED=lateralized periodic discharges; GPD=generalized periodic discharges; BIPDs =bilateral independent periodic discharges; Mf=multifocal; SIRPDs=stimulus induced rhythmic, periodic, or ictal appearing discharges; BIRDs=brief potentially ictal rhythmic discharges >4 Hz, lasting .5-10s; PFA (paroxysmal bursts >13 Hz or =8 Hz <10s).  Modifiers: +F=with fast component; +S=with spike component; +R=with rhythmic component.  S-B=burst suppression pattern.  Max=maximal. N1-drowsy; N2-stage II sleep; N3-slow wave sleep. SSS/BETS=small sharp spikes/benign epileptiform transients of sleep. HV=hyperventilation; PS=photic stimulation]]]    Daily EEG Visual Analysis    FINDINGS:      Background:  Continuity: Continuous  Symmetry: Symmetric  Posterior dominant rhythm (PDR): 9-9.5 Hz, reactive to eye closure. Symmetric low-amplitude frontal beta in wakefulness.  State Change: Present  Voltage: Normal  Anterior Posterior Gradient: Present  Other background findings: None  Breach: Absent    Background Slowing:  Generalized slowing: None  Focal slowing: None    State Changes:   Drowsiness is characterized by fragmentation, attenuation, and slowing of the background activity.  Stage 2 sleep is characterized by symmetric K complexes and sleep spindles.     Interictal Findings:  None    Electrographic and Electroclinical seizures:  None    Other Clinical Events:  No clinical event.    Activation Procedures:   Hyperventilation is not performed.    Photic stimulation is  not performed     Artifacts:  Intermittent myogenic and movement artifacts are present.   Poor electrode impedance and intermittently disconnected electrodes at times limit interpretation in latter portions.     EKG:  Single-lead EKG shows regular rhythm.    EEG Classification / Summary:  Normal EEG in the awake, drowsy, and asleep states.   -No focal or epileptiform abnormalities are captured.     Clinical Impression:  Normal video-EEG.   The captured numerous clinical events appear nonepileptic in etiology.  Intermittently disconnected electrodes at times limit interpretation in latter portions    -------------------------------------------------------------------------------------------------------  [facility] EEG Reading Room Ph#: [phone]  Epilepsy Answering Service after 5PM and before 8:30AM: Ph#: [phone]    Jaja Gamboa MD  Fellow, [facility] Epilepsy [place]     KAILASH FUENTES [facility]-[id number]     Study Date: 08/05/2023 0800- 08/05/23 1538   Duration: 7 hr 14 MINS  --------------------------------------------------------------------------------------------------  History:  CC/ HPI Patient is a 43y old  Female who presents with a chief complaint of Weakness in lower and upper extremities (28 Jul 2023 14:53)    MEDICATIONS  (STANDING):  amLODIPine   Tablet 5 milliGRAM(s) Oral daily  artificial  tears Solution 1 Drop(s) Both EYES four times a day  baclofen 10 milliGRAM(s) Oral every 8 hours  DULoxetine 30 milliGRAM(s) Oral at bedtime  enoxaparin Injectable 40 milliGRAM(s) SubCutaneous every 12 hours  lidocaine   4% Patch 1 Patch Transdermal daily  nystatin/triamcinolone Ointment 1 Application(s) Topical two times a day  polyethylene glycol 3350 17 Gram(s) Oral daily  pregabalin 200 milliGRAM(s) Oral two times a day    --------------------------------------------------------------------------------------------------  Study Interpretation:    [[[Abbreviation Key:  PDR=alpha rhythm/posterior dominant rhythm. A-P=anterior posterior.  Amplitude: ‘very low’:<20; ‘low’:20-49; ‘medium’:; ‘high’:>150uV.  Persistence for periodic/rhythmic patterns (% of epoch) ‘rare’:<1%; ‘occasional’:1-10%; ‘frequent’:10-50%; ‘abundant’:50-90%; ‘continuous’:>90%.  Persistence for sporadic discharges: ‘rare’:<1/hr; ‘occasional’:1/min-1/hr; ‘frequent’:>1/min; ‘abundant’:>1/10 sec.  RPP=rhythmic and periodic patterns; GRDA=generalized rhythmic delta activity; FIRDA=frontal intermittent GRDA; LRDA=lateralized rhythmic delta activity; TIRDA=temporal intermittent rhythmic delta activity;  LPD=PLED=lateralized periodic discharges; GPD=generalized periodic discharges; BIPDs =bilateral independent periodic discharges; Mf=multifocal; SIRPDs=stimulus induced rhythmic, periodic, or ictal appearing discharges; BIRDs=brief potentially ictal rhythmic discharges >4 Hz, lasting .5-10s; PFA (paroxysmal bursts >13 Hz or =8 Hz <10s).  Modifiers: +F=with fast component; +S=with spike component; +R=with rhythmic component.  S-B=burst suppression pattern.  Max=maximal. N1-drowsy; N2-stage II sleep; N3-slow wave sleep. SSS/BETS=small sharp spikes/benign epileptiform transients of sleep. HV=hyperventilation; PS=photic stimulation]]]    Daily EEG Visual Analysis    FINDINGS:      Background:  Continuity: Continuous  Symmetry: Symmetric  Posterior dominant rhythm (PDR): 9-9.5 Hz, reactive to eye closure. Symmetric low-amplitude frontal beta in wakefulness.  State Change: Present  Voltage: Normal  Anterior Posterior Gradient: Present  Other background findings: None  Breach: Absent    Background Slowing:  Generalized slowing: None  Focal slowing: None    State Changes:   Drowsiness is characterized by fragmentation, attenuation, and slowing of the background activity.  Stage 2 sleep is characterized by symmetric K complexes and sleep spindles.     Interictal Findings:  None    Electrographic and Electroclinical seizures:  None    Other Clinical Events:  No clinical event.    Activation Procedures:   Hyperventilation is not performed.    Photic stimulation is  not performed     Artifacts:  Intermittent myogenic and movement artifacts are present.   Poor electrode impedance and intermittently disconnected electrodes at times    EKG:  Single-lead EKG shows regular rhythm.    EEG Classification / Summary:  Normal EEG in the awake, drowsy, and asleep states.   -No focal or epileptiform abnormalities are captured.     Clinical Impression:  Normal video-EEG.   The captured numerous clinical events appear nonepileptic in etiology.  Intermittently disconnected electrodes at times limiting interpretation     -------------------------------------------------------------------------------------------------------  [facility] EEG Reading Room Ph#: [phone]  Epilepsy Answering Service after 5PM and before 8:30AM: Ph#: [phone]    Jaja Gamboa MD  Fellow, [facility] Epilepsy [place]

## 2023-08-06 NOTE — PROGRESS NOTE ADULT - ATTENDING COMMENTS
43F with PMH of sickle cell trait, asthma, peripheral neuropathy who presents s/p fall with ongoing weakness/paresthesia.  MRI H/spine showed no acute pathologies. LP done. CSF studies does not indicate any neuro-related culprits.   Hospital course notable for questionable seizure-like activities  - EEG showed no evidence of seizures  - ECHO also unrevealing  -Fat pad bx not suggestive of amyloidosis. Paraneoplastic panel negative.   -CT CAP showing no occult lesions to raise concern for malignancy.   - Medical/neuro workup essentially completed. At this point - concern for possible conversion disorder. Multiple bedside team meeting with neurology/medicine. Pt states sx worse since LP and believes she has new symptoms since then (mouth/lip tightness, unable to talk) and now no longer able to suppress her spasms as she had before.   Neuro recs appreciated. Thorough discussion with team -> no further neuro workup. Do not believe pts are attributable to any neurologic causes.  -Pt perseverates on spastic sx. States symptoms not suppressible ever since LP. This does not correspond to history from chart review as pt has been having issues with "suppressing" her erratic movements even prior to LP. This observation was made my medical team and also by procedure team, who attempted bedside LP (can refer to their note). additional episodes have since occurred and have been witnessed by medicine/neuro multiple times. Activity was shared w/ movement disorder specialist and multiple neuro specialist as per neurology team - no causes can be identified based on videos of movement activities and current diagnostics.. As such - medical/neurological workup is complete.  -Overnight EEG- Normal video-EEG. The captured numerous clinical events are not epileptic in nature. If this study returns negative - it was made clear to pt that there will be no additional tests planned.   - She was in agreement to be seen by . Spoke with  - they will see pt likely on Monday. Holistic RN also c/s for further assistance and support for patient.   -Continue duloxetine 60 daily.   -Cont lyrica and methocarbamol for symptomatic management - although benefit unclear at this time. If signs she is not tolerating or developing adverse effect - would consider stopping.   - Abnormal LFTS- likely secondary to  Hepatic steatosis and multiple indeterminant hypoechoic liver lesions. A CT scan or MRI utilizing liver mass protocol is recommended.  -DC planning for acute rehab. FU final  assessment.   -[facility] requested letter for shelter showing hospitalization -- provided to LIBBY. 43F with PMH of sickle cell trait, asthma, peripheral neuropathy who presents s/p fall with ongoing weakness/paresthesia.  MRI H/spine showed no acute pathologies. LP done. CSF studies does not indicate any neuro-related culprits.   Hospital course notable for questionable seizure-like activities.  EEG showed no evidence of seizures. ECHO also unrevealing. Fat pad bx not suggestive of amyloidosis. Paraneoplastic panel negative. CT CAP showing no occult lesions to raise concern for malignancy.   - Medical/neuro workup essentially completed.   -At this point, team concerned for possible conversion disorder. Multiple bedside team meeting with neurology/medicine. Pt states sx worse since LP and believes she has new symptoms since then (mouth/lip tightness, unable to talk) and now no longer able to suppress her spasms as she had before.   Neuro recs appreciated. Thorough discussion with team -> no further neuro workup. Do not believe pts are attributable to any neurologic causes.  -Pt perseverates on spastic sx. States symptoms not suppressible ever since LP. This does not correspond to history from chart review as pt has been having issues with "suppressing" her erratic movements even prior to LP. This observation was made my medical team and also by procedure team, who attempted bedside LP (can refer to their note). additional episodes have since occurred and have been witnessed by medicine/neuro multiple times. Activity was shared w/ movement disorder specialist and multiple neuro specialist as per neurology team - no causes can be identified based on videos of movement activities and current diagnostics.. As such - medical/neurological workup is complete.  -Overnight EEG repeated - Normal video-EEG. The captured numerous clinical events are not epileptic in nature. If this study returns negative - it was made clear to pt that there will be no additional tests planned.   - She was in agreement to be seen by . Spoke with  - they will see pt likely on Monday. Holistic RN also c/s for further assistance and support for patient.   -Continue duloxetine 60 daily.   -Cont lyrica and methocarbamol for symptomatic management - although benefit unclear at this time. If signs she is not tolerating or developing adverse effect - would consider stopping.   - Abnormal LFTS- likely secondary to  Hepatic steatosis and multiple indeterminant hypoechoic liver lesions. A CT scan or MRI utilizing liver mass protocol is recommended.  -DC planning for acute rehab. FU final  assessment.   -SW requested letter for shelter showing hospitalization -- provided to LIBBY.

## 2023-08-06 NOTE — PROGRESS NOTE ADULT - SUBJECTIVE AND OBJECTIVE BOX
PROGRESS NOTE:   Authored by Dr. Bharat Venegas MD (PGY-1).     Patient is a 43y old  Female who presents with a chief complaint of Weakness in lower and upper extremities (05 Aug 2023 08:42)      SUBJECTIVE / OVERNIGHT EVENTS:  No acute events overnight. She has no acute complaints this morning.     MEDICATIONS  (STANDING):  amLODIPine   Tablet 5 milliGRAM(s) Oral daily  artificial  tears Solution 1 Drop(s) Both EYES four times a day  DULoxetine 60 milliGRAM(s) Oral at bedtime  lidocaine   4% Patch 1 Patch Transdermal daily  methocarbamol 750 milliGRAM(s) Oral three times a day  nystatin/triamcinolone Ointment 1 Application(s) Topical two times a day  polyethylene glycol 3350 17 Gram(s) Oral daily  pregabalin 200 milliGRAM(s) Oral two times a day    MEDICATIONS  (PRN):  diphenhydrAMINE 50 milliGRAM(s) Oral once PRN Anxiety  diphenhydrAMINE Injectable 25 milliGRAM(s) IV Push every 6 hours PRN Rash and/or Itching  LORazepam   Injectable 2 milliGRAM(s) IV Push once PRN Anxiety  morphine  - Injectable 2 milliGRAM(s) IV Push every 4 hours PRN BREAKTHROUGH PAIN  traMADol 25 milliGRAM(s) Oral every 6 hours PRN Moderate Pain (4 - 6)  traMADol 50 milliGRAM(s) Oral every 6 hours PRN Severe Pain (7 - 10)      CAPILLARY BLOOD GLUCOSE        I&O's Summary      PHYSICAL EXAM:  Vital Signs Last 24 Hrs  T(C): 36.7 (06 Aug 2023 05:00), Max: 36.7 (05 Aug 2023 22:40)  T(F): 98.1 (06 Aug 2023 05:00), Max: 98.1 (05 Aug 2023 22:40)  HR: 83 (06 Aug 2023 05:00) (83 - 89)  BP: 106/63 (06 Aug 2023 05:00) (101/73 - 129/83)  BP(mean): --  RR: 18 (06 Aug 2023 05:00) (17 - 18)  SpO2: 98% (06 Aug 2023 05:00) (96% - 100%)    Parameters below as of 06 Aug 2023 05:00  Patient On (Oxygen Delivery Method): room air        CONSTITUTIONAL: NAD, well-developed  HEET: MMM, EOMI, PERRLA  NECK: supple  RESPIRATORY: Normal respiratory effort; lungs are clear to auscultation bilaterally  CARDIOVASCULAR: Regular rate and rhythm, normal S1 and S2, no murmur/rub/gallop; No lower extremity edema; Peripheral pulses are 2+ bilaterally  ABDOMEN: Nontender to palpation, normoactive bowel sounds, no rebound/guarding; No hepatosplenomegaly  MUSCULOSKELETAL: no clubbing or cyanosis of digits; no joint swelling or tenderness to palpation  NEURO: 5/5 strength in upper and lower extremities b/l on flexion and extension. Diminished sensation in lower extremities  PSYCH: A+O to person, place, and time; affect appropriate  SKIN: No rash    LABS:                      Tele Reviewed:    RADIOLOGY & ADDITIONAL TESTS:  Results Reviewed:   Imaging Personally Reviewed:  Electrocardiogram Personally Reviewed:

## 2023-08-07 LAB
ALBUMIN SERPL ELPH-MCNC: 3.4 G/DL — SIGNIFICANT CHANGE UP (ref 3.3–5)
ALP SERPL-CCNC: 64 U/L — SIGNIFICANT CHANGE UP (ref 40–120)
ALT FLD-CCNC: 26 U/L — SIGNIFICANT CHANGE UP (ref 4–33)
ANION GAP SERPL CALC-SCNC: 9 MMOL/L — SIGNIFICANT CHANGE UP (ref 7–14)
AST SERPL-CCNC: 36 U/L — HIGH (ref 4–32)
BASOPHILS # BLD AUTO: 0.02 K/UL — SIGNIFICANT CHANGE UP (ref 0–0.2)
BASOPHILS NFR BLD AUTO: 0.5 % — SIGNIFICANT CHANGE UP (ref 0–2)
BILIRUB SERPL-MCNC: 0.3 MG/DL — SIGNIFICANT CHANGE UP (ref 0.2–1.2)
BUN SERPL-MCNC: 12 MG/DL — SIGNIFICANT CHANGE UP (ref 7–23)
CALCIUM SERPL-MCNC: 9.3 MG/DL — SIGNIFICANT CHANGE UP (ref 8.4–10.5)
CHLORIDE SERPL-SCNC: 101 MMOL/L — SIGNIFICANT CHANGE UP (ref 98–107)
CO2 SERPL-SCNC: 28 MMOL/L — SIGNIFICANT CHANGE UP (ref 22–31)
CREAT SERPL-MCNC: 0.56 MG/DL — SIGNIFICANT CHANGE UP (ref 0.5–1.3)
EGFR: 116 ML/MIN/1.73M2 — SIGNIFICANT CHANGE UP
EOSINOPHIL # BLD AUTO: 0.34 K/UL — SIGNIFICANT CHANGE UP (ref 0–0.5)
EOSINOPHIL NFR BLD AUTO: 7.7 % — HIGH (ref 0–6)
GLUCOSE SERPL-MCNC: 81 MG/DL — SIGNIFICANT CHANGE UP (ref 70–99)
HCT VFR BLD CALC: 35.5 % — SIGNIFICANT CHANGE UP (ref 34.5–45)
HGB BLD-MCNC: 11.3 G/DL — LOW (ref 11.5–15.5)
IANC: 2.22 K/UL — SIGNIFICANT CHANGE UP (ref 1.8–7.4)
IMM GRANULOCYTES NFR BLD AUTO: 0.2 % — SIGNIFICANT CHANGE UP (ref 0–0.9)
LYMPHOCYTES # BLD AUTO: 1.54 K/UL — SIGNIFICANT CHANGE UP (ref 1–3.3)
LYMPHOCYTES # BLD AUTO: 34.8 % — SIGNIFICANT CHANGE UP (ref 13–44)
MAGNESIUM SERPL-MCNC: 1.6 MG/DL — SIGNIFICANT CHANGE UP (ref 1.6–2.6)
MCHC RBC-ENTMCNC: 28.5 PG — SIGNIFICANT CHANGE UP (ref 27–34)
MCHC RBC-ENTMCNC: 31.8 GM/DL — LOW (ref 32–36)
MCV RBC AUTO: 89.4 FL — SIGNIFICANT CHANGE UP (ref 80–100)
MONOCYTES # BLD AUTO: 0.3 K/UL — SIGNIFICANT CHANGE UP (ref 0–0.9)
MONOCYTES NFR BLD AUTO: 6.8 % — SIGNIFICANT CHANGE UP (ref 2–14)
NEUTROPHILS # BLD AUTO: 2.22 K/UL — SIGNIFICANT CHANGE UP (ref 1.8–7.4)
NEUTROPHILS NFR BLD AUTO: 50 % — SIGNIFICANT CHANGE UP (ref 43–77)
NRBC # BLD: 0 /100 WBCS — SIGNIFICANT CHANGE UP (ref 0–0)
NRBC # FLD: 0 K/UL — SIGNIFICANT CHANGE UP (ref 0–0)
PHOSPHATE SERPL-MCNC: 4.1 MG/DL — SIGNIFICANT CHANGE UP (ref 2.5–4.5)
PLATELET # BLD AUTO: 297 K/UL — SIGNIFICANT CHANGE UP (ref 150–400)
POTASSIUM SERPL-MCNC: 3.8 MMOL/L — SIGNIFICANT CHANGE UP (ref 3.5–5.3)
POTASSIUM SERPL-SCNC: 3.8 MMOL/L — SIGNIFICANT CHANGE UP (ref 3.5–5.3)
PROT SERPL-MCNC: 7 G/DL — SIGNIFICANT CHANGE UP (ref 6–8.3)
RBC # BLD: 3.97 M/UL — SIGNIFICANT CHANGE UP (ref 3.8–5.2)
RBC # FLD: 13.2 % — SIGNIFICANT CHANGE UP (ref 10.3–14.5)
SODIUM SERPL-SCNC: 138 MMOL/L — SIGNIFICANT CHANGE UP (ref 135–145)
WBC # BLD: 4.43 K/UL — SIGNIFICANT CHANGE UP (ref 3.8–10.5)
WBC # FLD AUTO: 4.43 K/UL — SIGNIFICANT CHANGE UP (ref 3.8–10.5)

## 2023-08-07 PROCEDURE — 99232 SBSQ HOSP IP/OBS MODERATE 35: CPT

## 2023-08-07 RX ORDER — TRAMADOL HYDROCHLORIDE 50 MG/1
50 TABLET ORAL EVERY 6 HOURS
Refills: 0 | Status: DISCONTINUED | OUTPATIENT
Start: 2023-08-09 | End: 2023-08-09

## 2023-08-07 RX ORDER — MAGNESIUM SULFATE 500 MG/ML
2 VIAL (ML) INJECTION ONCE
Refills: 0 | Status: COMPLETED | OUTPATIENT
Start: 2023-08-07 | End: 2023-08-07

## 2023-08-07 RX ORDER — TRAMADOL HYDROCHLORIDE 50 MG/1
25 TABLET ORAL EVERY 6 HOURS
Refills: 0 | Status: DISCONTINUED | OUTPATIENT
Start: 2023-08-09 | End: 2023-08-09

## 2023-08-07 RX ADMIN — AMLODIPINE BESYLATE 5 MILLIGRAM(S): 2.5 TABLET ORAL at 05:43

## 2023-08-07 RX ADMIN — Medication 1 APPLICATION(S): at 18:32

## 2023-08-07 RX ADMIN — Medication 200 MILLIGRAM(S): at 05:54

## 2023-08-07 RX ADMIN — DULOXETINE HYDROCHLORIDE 60 MILLIGRAM(S): 30 CAPSULE, DELAYED RELEASE ORAL at 22:50

## 2023-08-07 RX ADMIN — ENOXAPARIN SODIUM 40 MILLIGRAM(S): 100 INJECTION SUBCUTANEOUS at 22:50

## 2023-08-07 RX ADMIN — METHOCARBAMOL 750 MILLIGRAM(S): 500 TABLET, FILM COATED ORAL at 14:30

## 2023-08-07 RX ADMIN — METHOCARBAMOL 750 MILLIGRAM(S): 500 TABLET, FILM COATED ORAL at 22:50

## 2023-08-07 RX ADMIN — Medication 1 APPLICATION(S): at 05:42

## 2023-08-07 RX ADMIN — Medication 25 GRAM(S): at 09:38

## 2023-08-07 RX ADMIN — ENOXAPARIN SODIUM 40 MILLIGRAM(S): 100 INJECTION SUBCUTANEOUS at 14:08

## 2023-08-07 RX ADMIN — METHOCARBAMOL 750 MILLIGRAM(S): 500 TABLET, FILM COATED ORAL at 05:43

## 2023-08-07 RX ADMIN — Medication 200 MILLIGRAM(S): at 18:30

## 2023-08-07 NOTE — PROGRESS NOTE ADULT - ATTENDING COMMENTS
43F with PMH of sickle cell trait, asthma, peripheral neuropathy who presents s/p fall with ongoing weakness/paresthesia.  Extensive workup including: MRI H/spine, LP w/ CSF studies does not indicate any neuro-related culprits. Hospital course notable for questionable seizure-like activities.  EEG showed no evidence of seizures. ECHO also unrevealing. Fat pad bx not suggestive of amyloidosis. Paraneoplastic panel negative. CT CAP showing no occult lesions to raise concern for malignancy.   At this point, team concerned for possible conversion disorder. Multiple bedside team meeting with neurology/medicine. Pt states sx worse since LP and believes she has new symptoms since then (mouth/lip tightness, unable to talk) and now no longer able to suppress her spasms as she had before. Neuro recs appreciated. Thorough discussion with team -> no further neuro workup. Do not believe pts are attributable to any neurologic causes. Pt perseverates on spastic sx. States symptoms not suppressible ever since LP. This does not correspond to history from chart review as pt has been having issues with "suppressing" her erratic movements even prior to LP. This observation was made my medical team and also by procedure team, who attempted bedside LP (can refer to their note). Additional episodes have since occurred and have been witnessed by medicine/neuro multiple times. Activity was shared w/ movement disorder specialist and multiple neuro specialist as per neurology team - no causes can be identified based on videos of movement activities and current diagnostics.. As such - medical/neurological workup is complete. Repeat EEG captured numerous clinical events that are not epileptic in nature.       Patient continues to express frustration that she has not received adequate treatment for spasms. Reports spasms only started after to LP though as mentioned previously, there is documentation of patient having these erratic movements prior to LP.   Plan for  to see patient.   -Continue duloxetine 60 daily.   -Cont lyrica and methocarbamol for symptomatic management - although benefit unclear at this time. If signs she is not tolerating or developing adverse effect - would consider stopping.   - Abnormal LFTS- likely secondary to  Hepatic steatosis and multiple indeterminant hypoechoic liver lesions.   -DC planning for acute rehab. FU final  assessment.   -[facility] requested letter for shelter showing hospitalization -- provided to [facility]BY. 43F with PMH of sickle cell trait, asthma, peripheral neuropathy who presents s/p fall with ongoing weakness/paresthesia.  Extensive workup including: MRI H/spine, LP w/ CSF studies does not indicate any neuro-related culprits. Hospital course notable for questionable seizure-like activities.  EEG showed no evidence of seizures. ECHO also unrevealing. Fat pad bx not suggestive of amyloidosis. Paraneoplastic panel negative. CT CAP showing no occult lesions to raise concern for malignancy.   At this point, team concerned for possible conversion disorder. Multiple bedside team meeting with neurology/medicine. Pt states sx worse since LP and believes she has new symptoms since then (mouth/lip tightness, unable to talk) and now no longer able to suppress her spasms as she had before. Neuro recs appreciated. Thorough discussion with team -> no further neuro workup. Do not believe pts are attributable to any neurologic causes. Pt perseverates on spastic sx. States symptoms not suppressible ever since LP. This does not correspond to history from chart review as pt has been having issues with "suppressing" her erratic movements even prior to LP. This observation was made my medical team and also by procedure team, who attempted bedside LP (can refer to their note). Additional episodes have since occurred and have been witnessed by medicine/neuro multiple times. Activity was shared w/ movement disorder specialist and multiple neuro specialist as per neurology team - no causes can be identified based on videos of movement activities and current diagnostics.. As such - medical/neurological workup is complete. Repeat EEG captured numerous clinical events that are not epileptic in nature.       Patient continues to express frustration that she has not received adequate treatment for spasms. Reports spasms only started after to LP though as mentioned previously, there is documentation of patient having these erratic movements prior to LP.   Plan for  to see patient.   -Continue duloxetine 60 daily.   -Cont lyrica and methocarbamol for symptomatic management - although benefit unclear at this time. If signs she is not tolerating or developing adverse effect - would consider stopping.   - Abnormal LFTS- likely secondary to  Hepatic steatosis and multiple indeterminant hypoechoic liver lesions. MRI recommended however patient required anesthesia for MRI Brain. Would recommend OP follow up with standing/open MRI or CT liver.   -DC planning for acute rehab. FU final  assessment.   -[facility] requested letter for shelter showing hospitalization -- provided to LIBBY.

## 2023-08-07 NOTE — PROGRESS NOTE ADULT - SUBJECTIVE AND OBJECTIVE BOX
PROGRESS NOTE:   Authored by Dr. Bharat Venegas MD (PGY-1).     Patient is a 43y old  Female who presents with a chief complaint of Weakness in lower and upper extremities (06 Aug 2023 08:55)      SUBJECTIVE / OVERNIGHT EVENTS:  No acute events overnight. She has no acute complaints this morning.     MEDICATIONS  (STANDING):  amLODIPine   Tablet 5 milliGRAM(s) Oral daily  artificial  tears Solution 1 Drop(s) Both EYES four times a day  DULoxetine 60 milliGRAM(s) Oral at bedtime  enoxaparin Injectable 40 milliGRAM(s) SubCutaneous every 12 hours  lidocaine   4% Patch 1 Patch Transdermal daily  magnesium sulfate  IVPB 2 Gram(s) IV Intermittent once  methocarbamol 750 milliGRAM(s) Oral three times a day  nystatin/triamcinolone Ointment 1 Application(s) Topical two times a day  polyethylene glycol 3350 17 Gram(s) Oral daily  pregabalin 200 milliGRAM(s) Oral two times a day    MEDICATIONS  (PRN):  diphenhydrAMINE 50 milliGRAM(s) Oral once PRN Anxiety  diphenhydrAMINE Injectable 25 milliGRAM(s) IV Push every 6 hours PRN Rash and/or Itching  LORazepam   Injectable 2 milliGRAM(s) IV Push once PRN Anxiety  morphine  - Injectable 2 milliGRAM(s) IV Push every 4 hours PRN BREAKTHROUGH PAIN  traMADol 25 milliGRAM(s) Oral every 6 hours PRN Moderate Pain (4 - 6)  traMADol 50 milliGRAM(s) Oral every 6 hours PRN Severe Pain (7 - 10)      CAPILLARY BLOOD GLUCOSE        I&O's Summary      PHYSICAL EXAM:  Vital Signs Last 24 Hrs  T(C): 36.9 (07 Aug 2023 05:11), Max: 36.9 (07 Aug 2023 05:11)  T(F): 98.4 (07 Aug 2023 05:11), Max: 98.4 (07 Aug 2023 05:11)  HR: 83 (07 Aug 2023 05:11) (78 - 98)  BP: 117/81 (07 Aug 2023 05:11) (107/58 - 117/81)  BP(mean): --  RR: 18 (07 Aug 2023 05:11) (18 - 18)  SpO2: 99% (07 Aug 2023 05:11) (95% - 99%)    Parameters below as of 07 Aug 2023 05:11  Patient On (Oxygen Delivery Method): room air        CONSTITUTIONAL: NAD, well-developed  HEET: MMM, EOMI, PERRLA  NECK: supple  RESPIRATORY: Normal respiratory effort; lungs are clear to auscultation bilaterally  CARDIOVASCULAR: Regular rate and rhythm, normal S1 and S2, no murmur/rub/gallop; No lower extremity edema; Peripheral pulses are 2+ bilaterally  ABDOMEN: Nontender to palpation, normoactive bowel sounds, no rebound/guarding; No hepatosplenomegaly  MUSCULOSKELETAL: no clubbing or cyanosis of digits; no joint swelling or tenderness to palpation  NEURO: 5/5 strength in upper and lower extremities b/l on flexion and extension. Diminished sensation in lower extremities  PSYCH: A+O to person, place, and time; affect appropriate  SKIN: No rash    LABS:                        11.3   4.43  )-----------( 297      ( 07 Aug 2023 05:07 )             35.5     08-07    138  |  101  |  12  ----------------------------<  81  3.8   |  28  |  0.56    Ca    9.3      07 Aug 2023 05:07  Phos  4.1     08-07  Mg     1.60     08-07    TPro  7.0  /  Alb  3.4  /  TBili  0.3  /  DBili  x   /  AST  36<H>  /  ALT  26  /  AlkPhos  64  08-07          Urinalysis Basic - ( 07 Aug 2023 05:07 )    Color: x / Appearance: x / SG: x / pH: x  Gluc: 81 mg/dL / Ketone: x  / Bili: x / Urobili: x   Blood: x / Protein: x / Nitrite: x   Leuk Esterase: x / RBC: x / WBC x   Sq Epi: x / Non Sq Epi: x / Bacteria: x          Tele Reviewed:    RADIOLOGY & ADDITIONAL TESTS:  Results Reviewed:   Imaging Personally Reviewed:  Electrocardiogram Personally Reviewed:

## 2023-08-07 NOTE — PROGRESS NOTE ADULT - PROBLEM SELECTOR PLAN 1
Ascending weakness and paresthesias, beginning in 3/2023  Possible CIDP vs [place] vs transmyelitis   EMG: No electrophysiologic evidence of a motor polyradiculoneuropathy - findings are not consistent with a diagnosis of CIDP  MRI head/cervical/thoracic: Mid cervical degenerative disc disease, C4-C5 broad irregular right central and C5-C6 focal right foraminal disc protrusion (disc herniation) that cause ventral cord deformity. Not consistent with clinical presentation per neurosurgery    Plan:  - LP under sedation performed 7/27; CSF studies have been unremarkable so far  - MRI under sedation completed 7/26/23- unremarkable findings   - Also started on robaxin 750mg TID and motrin 600mg and duloxetine 60mg for additional pain control  - Neurology following; appreciate recs  - Dr. Moore not interested in providing 2nd opinion   -EEG, TTE normal, CT chest normal   - fatpad biopsy done 8/1; path results did not show amyloidosis   - f/u transthyretin, oligoclonal bands csf, paraneoplastic studies   -  follow up on Monday (8/7)

## 2023-08-07 NOTE — PROGRESS NOTE ADULT - PROBLEM SELECTOR PLAN 3
- Hypoechoic liver lesions and hepatic steatosis on RUQ US  - Hepatitis panel, HIV (-)  - CT A/P consistent with hepatic steatosis  - AST/ALT (36/26) 8/7/23  - ensure patient is not on any hepatotoxic meds

## 2023-08-08 LAB — OLIGOCLONAL BANDS CSF ELPH-IMP: SIGNIFICANT CHANGE UP

## 2023-08-08 PROCEDURE — 99233 SBSQ HOSP IP/OBS HIGH 50: CPT

## 2023-08-08 PROCEDURE — 99232 SBSQ HOSP IP/OBS MODERATE 35: CPT

## 2023-08-08 RX ADMIN — METHOCARBAMOL 750 MILLIGRAM(S): 500 TABLET, FILM COATED ORAL at 14:10

## 2023-08-08 RX ADMIN — ENOXAPARIN SODIUM 40 MILLIGRAM(S): 100 INJECTION SUBCUTANEOUS at 11:59

## 2023-08-08 RX ADMIN — Medication 1 APPLICATION(S): at 06:30

## 2023-08-08 RX ADMIN — ENOXAPARIN SODIUM 40 MILLIGRAM(S): 100 INJECTION SUBCUTANEOUS at 21:10

## 2023-08-08 RX ADMIN — METHOCARBAMOL 750 MILLIGRAM(S): 500 TABLET, FILM COATED ORAL at 06:30

## 2023-08-08 RX ADMIN — DULOXETINE HYDROCHLORIDE 60 MILLIGRAM(S): 30 CAPSULE, DELAYED RELEASE ORAL at 21:10

## 2023-08-08 RX ADMIN — Medication 1 APPLICATION(S): at 17:40

## 2023-08-08 RX ADMIN — METHOCARBAMOL 750 MILLIGRAM(S): 500 TABLET, FILM COATED ORAL at 21:10

## 2023-08-08 RX ADMIN — AMLODIPINE BESYLATE 5 MILLIGRAM(S): 2.5 TABLET ORAL at 06:30

## 2023-08-08 RX ADMIN — Medication 1 DROP(S): at 11:59

## 2023-08-08 RX ADMIN — Medication 200 MILLIGRAM(S): at 06:35

## 2023-08-08 RX ADMIN — Medication 200 MILLIGRAM(S): at 17:40

## 2023-08-08 NOTE — PROGRESS NOTE ADULT - SUBJECTIVE AND OBJECTIVE BOX
PROGRESS NOTE:   Authored by Dr. Bharat Venegas MD (PGY-1).     Patient is a 43y old  Female who presents with a chief complaint of Weakness in lower and upper extremities (07 Aug 2023 09:19)      SUBJECTIVE / OVERNIGHT EVENTS:  No acute events overnight. She has no acute complaints this morning.     MEDICATIONS  (STANDING):  amLODIPine   Tablet 5 milliGRAM(s) Oral daily  artificial  tears Solution 1 Drop(s) Both EYES four times a day  DULoxetine 60 milliGRAM(s) Oral at bedtime  enoxaparin Injectable 40 milliGRAM(s) SubCutaneous every 12 hours  lidocaine   4% Patch 1 Patch Transdermal daily  methocarbamol 750 milliGRAM(s) Oral three times a day  nystatin/triamcinolone Ointment 1 Application(s) Topical two times a day  polyethylene glycol 3350 17 Gram(s) Oral daily  pregabalin 200 milliGRAM(s) Oral two times a day    MEDICATIONS  (PRN):  diphenhydrAMINE 50 milliGRAM(s) Oral once PRN Anxiety  diphenhydrAMINE Injectable 25 milliGRAM(s) IV Push every 6 hours PRN Rash and/or Itching  LORazepam   Injectable 2 milliGRAM(s) IV Push once PRN Anxiety  morphine  - Injectable 2 milliGRAM(s) IV Push every 4 hours PRN BREAKTHROUGH PAIN  traMADol 25 milliGRAM(s) Oral every 6 hours PRN Moderate Pain (4 - 6)  traMADol 50 milliGRAM(s) Oral every 6 hours PRN Severe Pain (7 - 10)      CAPILLARY BLOOD GLUCOSE        I&O's Summary    07 Aug 2023 07:01  -  08 Aug 2023 07:00  --------------------------------------------------------  IN: 0 mL / OUT: 600 mL / NET: -600 mL        PHYSICAL EXAM:  Vital Signs Last 24 Hrs  T(C): 36.6 (08 Aug 2023 06:28), Max: 36.8 (07 Aug 2023 14:05)  T(F): 97.9 (08 Aug 2023 06:28), Max: 98.2 (07 Aug 2023 14:05)  HR: 74 (08 Aug 2023 06:28) (74 - 82)  BP: 109/70 (08 Aug 2023 06:28) (98/64 - 109/70)  BP(mean): --  RR: 18 (08 Aug 2023 06:28) (17 - 18)  SpO2: 97% (08 Aug 2023 06:28) (97% - 98%)    Parameters below as of 08 Aug 2023 06:28  Patient On (Oxygen Delivery Method): room air        CONSTITUTIONAL: NAD, well-developed  HEET: MMM, EOMI, PERRLA  NECK: supple  RESPIRATORY: Normal respiratory effort; lungs are clear to auscultation bilaterally  CARDIOVASCULAR: Regular rate and rhythm, normal S1 and S2, no murmur/rub/gallop; No lower extremity edema; Peripheral pulses are 2+ bilaterally  ABDOMEN: Nontender to palpation, normoactive bowel sounds, no rebound/guarding; No hepatosplenomegaly  MUSCULOSKELETAL: no clubbing or cyanosis of digits; no joint swelling or tenderness to palpation  NEURO: 5/5 strength in upper and lower extremities b/l on flexion and extension. Diminished sensation in lower extremities  PSYCH: A+O to person, place, and time; affect appropriate  SKIN: No rash    LABS:                        11.3   4.43  )-----------( 297      ( 07 Aug 2023 05:07 )             35.5     08-07    138  |  101  |  12  ----------------------------<  81  3.8   |  28  |  0.56    Ca    9.3      07 Aug 2023 05:07  Phos  4.1     08-07  Mg     1.60     08-07    TPro  7.0  /  Alb  3.4  /  TBili  0.3  /  DBili  x   /  AST  36<H>  /  ALT  26  /  AlkPhos  64  08-07          Urinalysis Basic - ( 07 Aug 2023 05:07 )    Color: x / Appearance: x / SG: x / pH: x  Gluc: 81 mg/dL / Ketone: x  / Bili: x / Urobili: x   Blood: x / Protein: x / Nitrite: x   Leuk Esterase: x / RBC: x / WBC x   Sq Epi: x / Non Sq Epi: x / Bacteria: x          Tele Reviewed:    RADIOLOGY & ADDITIONAL TESTS:  Results Reviewed:   Imaging Personally Reviewed:  Electrocardiogram Personally Reviewed:

## 2023-08-08 NOTE — PROGRESS NOTE ADULT - SUBJECTIVE AND OBJECTIVE BOX
patient seen, complains of new spasms/whole body, face, worse at night  feels numbness from chest down to feet     REVIEW OF SYSTEMS  Constitutional - No fever,  No fatigue  HEENT - No vertigo, No neck pain  Neurological - No headaches, No memory loss, No loss of strength, + numbness  Skin - No rashes, No lesions   Musculoskeletal - No joint pain, No joint swelling, No muscle pain  Psychiatric - No depression, No anxiety    FUNCTIONAL PROGRESS  8/7 PT  bed mobility min assist  transfers max assist with RW  gait min assist with RW x 2 steps     8/5 OT  bed mobility CG  LB dressing max assist     VITALS  T(C): 36.9 (08-08-23 @ 13:17), Max: 36.9 (08-08-23 @ 13:17)  HR: 76 (08-08-23 @ 13:17) (74 - 82)  BP: 100/62 (08-08-23 @ 13:17) (98/64 - 109/70)  RR: 17 (08-08-23 @ 13:17) (17 - 18)  SpO2: 97% (08-08-23 @ 13:17) (97% - 98%)  Wt(kg): --    MEDICATIONS   amLODIPine   Tablet 5 milliGRAM(s) daily  artificial  tears Solution 1 Drop(s) four times a day  diphenhydrAMINE 50 milliGRAM(s) once PRN  diphenhydrAMINE Injectable 25 milliGRAM(s) every 6 hours PRN  DULoxetine 60 milliGRAM(s) at bedtime  enoxaparin Injectable 40 milliGRAM(s) every 12 hours  lidocaine   4% Patch 1 Patch daily  LORazepam   Injectable 2 milliGRAM(s) once PRN  methocarbamol 750 milliGRAM(s) three times a day  morphine  - Injectable 2 milliGRAM(s) every 4 hours PRN  nystatin/triamcinolone Ointment 1 Application(s) two times a day  polyethylene glycol 3350 17 Gram(s) daily  pregabalin 200 milliGRAM(s) two times a day  traMADol 50 milliGRAM(s) every 6 hours PRN  traMADol 25 milliGRAM(s) every 6 hours PRN      RECENT LABS - Reviewed                        11.3   4.43  )-----------( 297      ( 07 Aug 2023 05:07 )             35.5     08-07    138  |  101  |  12  ----------------------------<  81  3.8   |  28  |  0.56    Ca    9.3      07 Aug 2023 05:07  Phos  4.1     08-07  Mg     1.60     08-07    TPro  7.0  /  Alb  3.4  /  TBili  0.3  /  DBili  x   /  AST  36<H>  /  ALT  26  /  AlkPhos  64  08-07      Urinalysis Basic - ( 07 Aug 2023 05:07 )    Color: x / Appearance: x / SG: x / pH: x  Gluc: 81 mg/dL / Ketone: x  / Bili: x / Urobili: x   Blood: x / Protein: x / Nitrite: x   Leuk Esterase: x / RBC: x / WBC x   Sq Epi: x / Non Sq Epi: x / Bacteria: x    < from: MR Lumbar Spine w/wo IV Cont (07.27.23 @ 08:05) >    IMPRESSION:    1. BRAIN:  No abnormal brain enhancement. Cerebral hemispheric frontal   subcortical white matter lesions are nonspecific. The pattern suggests   migraine disease. Subcortical lesions of ischemic white matter disease   could have a similar appearance. The differential diagnosis for these   lesions remains broad. This differential diagnosis would also include   other inflammatory, infectious, demyelinating, metabolic and conceivably   other etiologies. Please note, however, that the extent of this disease   is mild.    2. CERVICAL SPINE:   No abnormal cervical enhancement.  Reversal of the   cervical lordosis and moderate mid cervical degenerative disc disease are   findings unchanged from 7/20/2023, see prior report    3. THORACIC SPINE:   No abnormal thoracic enhancement. Thoracic cord   appears intact. T2-T3 shallow right foraminal disc protrusion  (disc   herniation) is associated with slight grade 1 anterior listhesis T2 on T3    4. LUMBAR SPINE:   No abnormallumbar enhancement.   Intact conus and   cauda equina. No significant lumbar disc pathology. Transitional   morphology thoracic lumbar junction    5. There is a generalized pattern of patchy signal heterogeneity on the   T1-weighted images that is nonspecific. This may reflect chronic anemia   or other systemic process although is without focal strongly suspicious   lesion    < end of copied text >      PHYSICAL EXAM  Constitutional - NAD, Comfortable, in bed   Chest - breathing comfortably   Cardiovascular - RRR, S1S2  Abdomen - BS+, Soft, NTND  Extremities - No C/C/E, No calf tenderness   Neurologic Exam -      follows commands               Cognitive - Awake, Alert, Oriented to self, place, date, year, situation     Communication - Fluent, No dysarthria       Motor - moves all ext      Sensory - decreased b/l LE     Psychiatric - Affect WNL      ASSESSMENT/PLAN  Ms Perez is a 42yo woman with a hx of peripheral neuropathy (diagnosed at OSH in 3/2023), sickle cell trait, asthma, and anemia with 8 months of progressively worsening painful paresthesias and weakness and with functional, gait, ADL impairments.   patient reports falls at home, started using rollator outside home in April   MRI imaging with no findings   s/p EMG, not consistent with CIDP  s/p LP, fatpad biopsy  awaiting  follow up  patient states she is concerned about new spasms, and does not feel she can be discharged/go to rehab with these spasms at night   pain, robaxin, lidocaine patch, duloxetine lyrica, tramadol, morphine IV    Disposition -  continue bedside therapy, out of bed to chair daily  requires max assist with transfers, has not been ambulating with therapy, only 1-3 steps documented since admission in July   needs to show some progress with therapy for acute rehab, patient must be able to tolerate three hours of therapy daily   DVT Prophylaxis - Lovenox  will continue to follow

## 2023-08-08 NOTE — PROGRESS NOTE ADULT - PROBLEM SELECTOR PLAN 1
Ascending weakness and paresthesias, beginning in 3/2023  Possible CIDP vs Guillan [place] vs transmyelitis   EMG: No electrophysiologic evidence of a motor polyradiculoneuropathy - findings are not consistent with a diagnosis of CIDP  MRI head/cervical/thoracic: Mid cervical degenerative disc disease, C4-C5 broad irregular right central and C5-C6 focal right foraminal disc protrusion (disc herniation) that cause ventral cord deformity. Not consistent with clinical presentation per neurosurgery    Plan:  - LP under sedation performed 7/27; CSF studies have been unremarkable so far  - MRI under sedation completed 7/26/23- unremarkable findings   - Also started on robaxin 750mg TID and motrin 600mg and duloxetine 60mg for additional pain control  - Neurology following; appreciate recs  - Dr. Moore not interested in providing 2nd opinion   -EEG, TTE normal, CT chest normal   - fatpad biopsy done 8/1; path results did not show amyloidosis   - f/u transthyretin, oligoclonal bands csf, paraneoplastic studies   -  follow up 8/8

## 2023-08-08 NOTE — PROGRESS NOTE ADULT - ATTENDING COMMENTS
43F with PMH of sickle cell trait, asthma, peripheral neuropathy who presents s/p fall with ongoing weakness/paresthesia. Patient has undergone extensive workup including: MRI H/spine, LP w/ CSF studies does not indicate any neuro-related culprits. Hospital course notable for questionable seizure-like activities but EEG negative.  Further workup including echo, fat pad biopsy, paraneoplastic panel, CT A/P all wnl/ no explanation for sx. C/f conversion disorder given negative workup. S/p multiple bedside meetings with Neurology and primary team. Patient reporting sx have been worse since LP and patient reports new symptoms though on chart review, patient documented to have these spasms/erratic movements prior to LP ( see chart note 7/25). This was also noted by medical team. Activity was shared w/ movement disorder specialist and multiple neuro specialist as per neurology team - no causes can be identified based on videos of movement activities and current diagnostics.. As such - medical/neurological workup is complete.     Patient continues to report frustration that she has not gotten treated for spasms, and reports spasms only started after LP. Reports documentation prior to LP is not true.     # Spasms, paresthesias   Reinforced that plan is for BH evaluation, continued PT/OT, BH follow up. Provided emotional support and encouraged patient to work with PT which patient reports she does.   - Continue duloxetine 60 daily, lyrica and methocarbamol for symptomatic management - unclear benefit at this time. If signs she is not tolerating or developing adverse effect - would consider stopping.     # Liver lesions   - LFT's improving. Likely related to hepatic steatosis. Also noted to have indeterminate hypoechoic liver lesions. MRI recommended however patient required anesthesia for MRI brain. Recommend OP follow up with standing/open MRI or CT liver.     D/C planning to rehab- will need accepting facility.

## 2023-08-08 NOTE — PROGRESS NOTE ADULT - PROBLEM SELECTOR PLAN 3
- Hypoechoic liver lesions and hepatic steatosis on RUQ US  - Hepatitis panel, HIV (-)  - CT A/P consistent with hepatic steatosis  - AST/ALT (36/26) 8/7/23  - ensure patient is not on any hepatotoxic meds  - recommend f/u outpatient for MRI abdomen to better visualize liver lesions

## 2023-08-09 PROCEDURE — 99232 SBSQ HOSP IP/OBS MODERATE 35: CPT

## 2023-08-09 RX ADMIN — METHOCARBAMOL 750 MILLIGRAM(S): 500 TABLET, FILM COATED ORAL at 05:26

## 2023-08-09 RX ADMIN — Medication 1 APPLICATION(S): at 17:27

## 2023-08-09 RX ADMIN — METHOCARBAMOL 750 MILLIGRAM(S): 500 TABLET, FILM COATED ORAL at 13:03

## 2023-08-09 RX ADMIN — Medication 1 APPLICATION(S): at 05:26

## 2023-08-09 RX ADMIN — METHOCARBAMOL 750 MILLIGRAM(S): 500 TABLET, FILM COATED ORAL at 22:36

## 2023-08-09 RX ADMIN — DULOXETINE HYDROCHLORIDE 60 MILLIGRAM(S): 30 CAPSULE, DELAYED RELEASE ORAL at 22:36

## 2023-08-09 RX ADMIN — Medication 200 MILLIGRAM(S): at 17:26

## 2023-08-09 RX ADMIN — Medication 1 DROP(S): at 17:26

## 2023-08-09 RX ADMIN — Medication 200 MILLIGRAM(S): at 05:26

## 2023-08-09 RX ADMIN — AMLODIPINE BESYLATE 5 MILLIGRAM(S): 2.5 TABLET ORAL at 05:26

## 2023-08-09 RX ADMIN — ENOXAPARIN SODIUM 40 MILLIGRAM(S): 100 INJECTION SUBCUTANEOUS at 22:35

## 2023-08-09 RX ADMIN — ENOXAPARIN SODIUM 40 MILLIGRAM(S): 100 INJECTION SUBCUTANEOUS at 12:28

## 2023-08-09 NOTE — BH CONSULTATION LIAISON PROGRESS NOTE - NSBHCHARTREVIEWVS_PSY_A_CORE FT
Vital Signs Last 24 Hrs  T(C): 36.6 (09 Aug 2023 05:25), Max: 37.3 (08 Aug 2023 21:49)  T(F): 97.9 (09 Aug 2023 05:25), Max: 99.1 (08 Aug 2023 21:49)  HR: 73 (09 Aug 2023 05:25) (73 - 79)  BP: 109/62 (09 Aug 2023 05:25) (100/62 - 123/75)  BP(mean): --  RR: 18 (09 Aug 2023 05:25) (17 - 18)  SpO2: 100% (09 Aug 2023 05:25) (97% - 100%)    Parameters below as of 09 Aug 2023 05:25  Patient On (Oxygen Delivery Method): room air

## 2023-08-09 NOTE — BH CONSULTATION LIAISON PROGRESS NOTE - NSBHASSESSMENTFT_PSY_ALL_CORE
40 yr old female with PPH of depression and PMH significant for peripheral neuropathy, who presented with LE,UE weakness. Patient has had extensive workup including LP. After LP was having abnormal limb movements. Neurology has been following.  Psych consulted for concern for PNES and conversion disorder.    Appreciate concern for PNES vs conversion disorder vs functional neurologic disorder. These diagnoses are diagnoses of exclusion- though it appears that the primary team and neurology feel that the workup has now been complete.    The patient does not want to accept that this may be psychically driven. At this point, would recommend patient follow up with a neurology clinic that has a therapist/therapy specializing in the above disorders.    Will sign off. Call with questions.

## 2023-08-09 NOTE — PROGRESS NOTE ADULT - ATTENDING COMMENTS
43F with PMH of sickle cell trait, asthma, peripheral neuropathy who presents s/p fall with ongoing weakness/paresthesia. Patient has undergone extensive workup including: MRI H/spine, LP w/ CSF studies does not indicate any neuro-related culprits. Hospital course notable for questionable seizure-like activities but EEG negative.  Further workup including echo, fat pad biopsy, paraneoplastic panel, CT A/P all wnl/ no explanation for sx. C/f conversion disorder given negative workup. S/p multiple bedside meetings with Neurology and primary team. Patient reporting sx have been worse since LP and patient reports new symptoms though on chart review, patient documented to have these spasms/erratic movements prior to LP ( see chart note 7/25). This was also noted by medical team. Activity was shared w/ movement disorder specialist and multiple neuro specialists as per neurology team - no causes can be identified based on videos of movement activities and current diagnostics, appears to be functional. As such - medical/neurological workup is complete.     Patient perseverating on spasms, timing of events and feels confident sx started prior to LP despite being told there is documentation preceding LP reporting spasms.     # Spasms, paresthesias   OP Psych follow up. Patient can see movement specialist as OP if desired. Recommend continued PT/OT, will ask PT to see patient today/tomm to assess candidacy for [facility] v [facility].   - Continue duloxetine 60 daily, lyrica and methocarbamol for symptomatic management - unclear benefit at this time. If signs she is not tolerating or developing adverse effect - would consider stopping.     # Liver lesions   - LFT's improving. Likely related to hepatic steatosis. Also noted to have indeterminate hypoechoic liver lesions. MRI recommended however patient required anesthesia for MRI brain. Recommend OP follow up with standing/open MRI or CT liver.     D/C planning to rehab- will need accepting facility.

## 2023-08-09 NOTE — BH CONSULTATION LIAISON PROGRESS NOTE - NSBHCONSULTFOLLOWAFTERCARE_PSY_A_CORE FT
[facility]  [address] [place] [id number] [phone]

## 2023-08-09 NOTE — BH CONSULTATION LIAISON PROGRESS NOTE - NSBHCONSULTFOLLOW_PSY_ALL_CORE
Detail Level: Detailed
Quality 265: Biopsy Follow-Up: Biopsy results reviewed, communicated, tracked, and documented
No, psychiatric follow up needed - call with questions...

## 2023-08-09 NOTE — BH CONSULTATION LIAISON PROGRESS NOTE - CURRENT MEDICATION
MEDICATIONS  (STANDING):  amLODIPine   Tablet 5 milliGRAM(s) Oral daily  artificial  tears Solution 1 Drop(s) Both EYES four times a day  DULoxetine 60 milliGRAM(s) Oral at bedtime  enoxaparin Injectable 40 milliGRAM(s) SubCutaneous every 12 hours  lidocaine   4% Patch 1 Patch Transdermal daily  methocarbamol 750 milliGRAM(s) Oral three times a day  nystatin/triamcinolone Ointment 1 Application(s) Topical two times a day  polyethylene glycol 3350 17 Gram(s) Oral daily  pregabalin 200 milliGRAM(s) Oral two times a day    MEDICATIONS  (PRN):  diphenhydrAMINE 50 milliGRAM(s) Oral once PRN Anxiety  diphenhydrAMINE Injectable 25 milliGRAM(s) IV Push every 6 hours PRN Rash and/or Itching  LORazepam   Injectable 2 milliGRAM(s) IV Push once PRN Anxiety  traMADol 50 milliGRAM(s) Oral every 6 hours PRN Severe Pain (7 - 10)  traMADol 25 milliGRAM(s) Oral every 6 hours PRN Moderate Pain (4 - 6)

## 2023-08-09 NOTE — BH CONSULTATION LIAISON PROGRESS NOTE - NSBHFUPINTERVALHXFT_PSY_A_CORE
Asked to re-evaluate patient. Current neuro workup remains negative for organic causes for movement issues.  Patient seen this late AM. She is resting calmly. She endorses frustration and some anger towards not being believed. She feels that all of this happened after the LP and that no one believes her. She said that she was told that she would get further imaging, however that is now being cancelled.  She does not believe that this can be psychically related and says that she has had more stress in her life in the past and never had these issues. She denies SI.

## 2023-08-09 NOTE — PROGRESS NOTE ADULT - SUBJECTIVE AND OBJECTIVE BOX
PROGRESS NOTE:   Authored by Dr. Bharat Venegas MD (PGY-1).     Patient is a 43y old  Female who presents with a chief complaint of Weakness in lower and upper extremities (08 Aug 2023 13:18)      SUBJECTIVE / OVERNIGHT EVENTS:  No acute events overnight. She has no acute complaints this morning.     MEDICATIONS  (STANDING):  amLODIPine   Tablet 5 milliGRAM(s) Oral daily  artificial  tears Solution 1 Drop(s) Both EYES four times a day  DULoxetine 60 milliGRAM(s) Oral at bedtime  enoxaparin Injectable 40 milliGRAM(s) SubCutaneous every 12 hours  lidocaine   4% Patch 1 Patch Transdermal daily  methocarbamol 750 milliGRAM(s) Oral three times a day  nystatin/triamcinolone Ointment 1 Application(s) Topical two times a day  polyethylene glycol 3350 17 Gram(s) Oral daily  pregabalin 200 milliGRAM(s) Oral two times a day    MEDICATIONS  (PRN):  diphenhydrAMINE 50 milliGRAM(s) Oral once PRN Anxiety  diphenhydrAMINE Injectable 25 milliGRAM(s) IV Push every 6 hours PRN Rash and/or Itching  LORazepam   Injectable 2 milliGRAM(s) IV Push once PRN Anxiety  morphine  - Injectable 2 milliGRAM(s) IV Push every 4 hours PRN BREAKTHROUGH PAIN  traMADol 25 milliGRAM(s) Oral every 6 hours PRN Moderate Pain (4 - 6)  traMADol 50 milliGRAM(s) Oral every 6 hours PRN Severe Pain (7 - 10)      CAPILLARY BLOOD GLUCOSE        I&O's Summary    08 Aug 2023 07:01  -  09 Aug 2023 07:00  --------------------------------------------------------  IN: 420 mL / OUT: 1700 mL / NET: -1280 mL        PHYSICAL EXAM:  Vital Signs Last 24 Hrs  T(C): 36.6 (09 Aug 2023 05:25), Max: 37.3 (08 Aug 2023 21:49)  T(F): 97.9 (09 Aug 2023 05:25), Max: 99.1 (08 Aug 2023 21:49)  HR: 73 (09 Aug 2023 05:25) (73 - 79)  BP: 109/62 (09 Aug 2023 05:25) (100/62 - 123/75)  BP(mean): --  RR: 18 (09 Aug 2023 05:25) (17 - 18)  SpO2: 100% (09 Aug 2023 05:25) (97% - 100%)    Parameters below as of 09 Aug 2023 05:25  Patient On (Oxygen Delivery Method): room air        CONSTITUTIONAL: NAD, well-developed  HEET: MMM, EOMI, PERRLA  NECK: supple  RESPIRATORY: Normal respiratory effort; lungs are clear to auscultation bilaterally  CARDIOVASCULAR: Regular rate and rhythm, normal S1 and S2, no murmur/rub/gallop; No lower extremity edema; Peripheral pulses are 2+ bilaterally  ABDOMEN: Nontender to palpation, normoactive bowel sounds, no rebound/guarding; No hepatosplenomegaly  MUSCULOSKELETAL: no clubbing or cyanosis of digits; no joint swelling or tenderness to palpation  NEURO: Moving all four extremities, sensation grossly intact  PSYCH: A+O to person, place, and time; affect appropriate  SKIN: No rash    LABS:                      Tele Reviewed:    RADIOLOGY & ADDITIONAL TESTS:  Results Reviewed:   Imaging Personally Reviewed:  Electrocardiogram Personally Reviewed:     PROGRESS NOTE:   Authored by Dr. Bharat Venegas MD (PGY-1).     Patient is a 43y old  Female who presents with a chief complaint of Weakness in lower and upper extremities (08 Aug 2023 13:18)      SUBJECTIVE / OVERNIGHT EVENTS:  No acute events overnight. She has no acute complaints this morning.     MEDICATIONS  (STANDING):  amLODIPine   Tablet 5 milliGRAM(s) Oral daily  artificial  tears Solution 1 Drop(s) Both EYES four times a day  DULoxetine 60 milliGRAM(s) Oral at bedtime  enoxaparin Injectable 40 milliGRAM(s) SubCutaneous every 12 hours  lidocaine   4% Patch 1 Patch Transdermal daily  methocarbamol 750 milliGRAM(s) Oral three times a day  nystatin/triamcinolone Ointment 1 Application(s) Topical two times a day  polyethylene glycol 3350 17 Gram(s) Oral daily  pregabalin 200 milliGRAM(s) Oral two times a day    MEDICATIONS  (PRN):  diphenhydrAMINE 50 milliGRAM(s) Oral once PRN Anxiety  diphenhydrAMINE Injectable 25 milliGRAM(s) IV Push every 6 hours PRN Rash and/or Itching  LORazepam   Injectable 2 milliGRAM(s) IV Push once PRN Anxiety  morphine  - Injectable 2 milliGRAM(s) IV Push every 4 hours PRN BREAKTHROUGH PAIN  traMADol 25 milliGRAM(s) Oral every 6 hours PRN Moderate Pain (4 - 6)  traMADol 50 milliGRAM(s) Oral every 6 hours PRN Severe Pain (7 - 10)      CAPILLARY BLOOD GLUCOSE        I&O's Summary    08 Aug 2023 07:01  -  09 Aug 2023 07:00  --------------------------------------------------------  IN: 420 mL / OUT: 1700 mL / NET: -1280 mL        PHYSICAL EXAM:  Vital Signs Last 24 Hrs  T(C): 36.6 (09 Aug 2023 05:25), Max: 37.3 (08 Aug 2023 21:49)  T(F): 97.9 (09 Aug 2023 05:25), Max: 99.1 (08 Aug 2023 21:49)  HR: 73 (09 Aug 2023 05:25) (73 - 79)  BP: 109/62 (09 Aug 2023 05:25) (100/62 - 123/75)  BP(mean): --  RR: 18 (09 Aug 2023 05:25) (17 - 18)  SpO2: 100% (09 Aug 2023 05:25) (97% - 100%)    Parameters below as of 09 Aug 2023 05:25  Patient On (Oxygen Delivery Method): room air        CONSTITUTIONAL: NAD, well-developed  HEET: MMM, EOMI, PERRLA  NECK: supple  RESPIRATORY: Normal respiratory effort; lungs are clear to auscultation bilaterally  CARDIOVASCULAR: Regular rate and rhythm, normal S1 and S2, no murmur/rub/gallop; No lower extremity edema; Peripheral pulses are 2+ bilaterally  ABDOMEN: Nontender to palpation, normoactive bowel sounds, no rebound/guarding; No hepatosplenomegaly  MUSCULOSKELETAL: no clubbing or cyanosis of digits; no joint swelling or tenderness to palpation  NEURO: 5/5 strength in upper and lower extremities b/l on flexion and extension. Diminished sensation in lower extremities  PSYCH: A+O to person, place, and time; affect appropriate  SKIN: No rash    LABS:                      Tele Reviewed:    RADIOLOGY & ADDITIONAL TESTS:  Results Reviewed:   Imaging Personally Reviewed:  Electrocardiogram Personally Reviewed:

## 2023-08-09 NOTE — PROGRESS NOTE ADULT - PROBLEM SELECTOR PLAN 1
Ascending weakness and paresthesias, beginning in 3/2023  Possible CIDP vs Guillan [place] vs transmyelitis   EMG: No electrophysiologic evidence of a motor polyradiculoneuropathy - findings are not consistent with a diagnosis of CIDP  MRI head/cervical/thoracic: Mid cervical degenerative disc disease, C4-C5 broad irregular right central and C5-C6 focal right foraminal disc protrusion (disc herniation) that cause ventral cord deformity. Not consistent with clinical presentation per neurosurgery    Plan:  - LP under sedation performed 7/27; CSF studies have been unremarkable so far  - MRI under sedation completed 7/26/23- unremarkable findings   - Also started on robaxin 750mg TID and motrin 600mg and duloxetine 60mg for additional pain control  - Neurology following; appreciate recs  - Dr. Moore not interested in providing 2nd opinion   -EEG, TTE normal, CT chest normal   - fatpad biopsy done 8/1; path results did not show amyloidosis   - f/u transthyretin, oligoclonal bands csf, paraneoplastic studies   -  follow up 8/9

## 2023-08-09 NOTE — BH CONSULTATION LIAISON PROGRESS NOTE - MSE UNSTRUCTURED FT
Mental Status Exam:  Eric: well groomed, fair hygiene     Behavior: calm, cooperative, no psychomotor retardation/agitation  Motor: no tremors, EPS, or rigidity  Gait: did not assess, pt in bed  Speech: normal rate, rhythm, prosody and volume   Mood: "frustrated"  Affect: euthymic, congruent  Thought process: clear, goal directed   Thought Content: denies paranoia, delusions; focuses on her movement issues  Perception: denies AH/VH  SI: denies  HI: denies  Insight: limited  Judgment: limited    Cognitive Exam:  Orientation: AOx3  Recall: intact  Attention: intact  Abstraction: intact

## 2023-08-10 PROCEDURE — 99232 SBSQ HOSP IP/OBS MODERATE 35: CPT

## 2023-08-10 RX ADMIN — DULOXETINE HYDROCHLORIDE 60 MILLIGRAM(S): 30 CAPSULE, DELAYED RELEASE ORAL at 22:52

## 2023-08-10 RX ADMIN — ENOXAPARIN SODIUM 40 MILLIGRAM(S): 100 INJECTION SUBCUTANEOUS at 09:55

## 2023-08-10 RX ADMIN — METHOCARBAMOL 750 MILLIGRAM(S): 500 TABLET, FILM COATED ORAL at 06:22

## 2023-08-10 RX ADMIN — Medication 200 MILLIGRAM(S): at 06:22

## 2023-08-10 RX ADMIN — Medication 200 MILLIGRAM(S): at 18:07

## 2023-08-10 RX ADMIN — ENOXAPARIN SODIUM 40 MILLIGRAM(S): 100 INJECTION SUBCUTANEOUS at 22:52

## 2023-08-10 RX ADMIN — METHOCARBAMOL 750 MILLIGRAM(S): 500 TABLET, FILM COATED ORAL at 12:53

## 2023-08-10 RX ADMIN — METHOCARBAMOL 750 MILLIGRAM(S): 500 TABLET, FILM COATED ORAL at 22:52

## 2023-08-10 RX ADMIN — Medication 1 APPLICATION(S): at 18:07

## 2023-08-10 NOTE — PROGRESS NOTE ADULT - PROBLEM SELECTOR PLAN 1
Ascending weakness and paresthesias, beginning in 3/2023  Possible CIDP vs Guillan [place] vs transmyelitis vs PNES  EMG: No electrophysiologic evidence of a motor polyradiculoneuropathy - findings are not consistent with a diagnosis of CIDP  MRI head/cervical/thoracic: Mid cervical degenerative disc disease, C4-C5 broad irregular right central and C5-C6 focal right foraminal disc protrusion (disc herniation) that cause ventral cord deformity. Not consistent with clinical presentation per neurosurgery    Plan:  - LP under sedation performed 7/27; CSF studies have been unremarkable so far  - MRI under sedation completed 7/26/23- unremarkable findings   - Also started on robaxin 750mg TID and motrin 600mg and duloxetine 60mg for additional pain control  - Neurology following; appreciate recs  - Dr. Moore not interested in providing 2nd opinion   -EEG, TTE normal, CT chest normal   - fatpad biopsy done 8/1; path results did not show amyloidosis  - CSF oligoclonal bands neg   - f/u transthyretin, sf, paraneoplastic studies   -  follow up 8/9; recommend outpatient neuro f/u Ascending weakness and paresthesias, beginning in 3/2023  EMG: No electrophysiologic evidence of a motor polyradiculoneuropathy - findings are not consistent with a diagnosis of CIDP  MRI head/cervical/thoracic: Mid cervical degenerative disc disease, C4-C5 broad irregular right central and C5-C6 focal right foraminal disc protrusion (disc herniation) that cause ventral cord deformity. Not consistent with clinical presentation per neurosurgery    Plan:  - LP under sedation performed 7/27; CSF studies have been unremarkable so far  - MRI under sedation completed 7/26/23- unremarkable findings   - Also started on robaxin 750mg TID and motrin 600mg and duloxetine 60mg for additional pain control  - Neurology following; appreciate recs  - EEG, TTE normal, CT chest normal   - fatpad biopsy done 8/1; path results did not show amyloidosis  - CSF oligoclonal bands neg   - f/u transthyretin, sf, paraneoplastic studies   -  follow up 8/9; recommend outpatient neuro f/u

## 2023-08-10 NOTE — PROGRESS NOTE ADULT - SUBJECTIVE AND OBJECTIVE BOX
PROGRESS NOTE:   Authored by Dr. Bharat Venegas MD (PGY-1).     Patient is a 43y old  Female who presents with a chief complaint of Weakness in lower and upper extremities (09 Aug 2023 08:42)      SUBJECTIVE / OVERNIGHT EVENTS:  No acute events overnight. She has no acute complaints this morning.     MEDICATIONS  (STANDING):  amLODIPine   Tablet 5 milliGRAM(s) Oral daily  artificial  tears Solution 1 Drop(s) Both EYES four times a day  DULoxetine 60 milliGRAM(s) Oral at bedtime  enoxaparin Injectable 40 milliGRAM(s) SubCutaneous every 12 hours  lidocaine   4% Patch 1 Patch Transdermal daily  methocarbamol 750 milliGRAM(s) Oral three times a day  nystatin/triamcinolone Ointment 1 Application(s) Topical two times a day  polyethylene glycol 3350 17 Gram(s) Oral daily  pregabalin 200 milliGRAM(s) Oral two times a day    MEDICATIONS  (PRN):  diphenhydrAMINE 50 milliGRAM(s) Oral once PRN Anxiety  diphenhydrAMINE Injectable 25 milliGRAM(s) IV Push every 6 hours PRN Rash and/or Itching  LORazepam   Injectable 2 milliGRAM(s) IV Push once PRN Anxiety  traMADol 50 milliGRAM(s) Oral every 6 hours PRN Severe Pain (7 - 10)  traMADol 25 milliGRAM(s) Oral every 6 hours PRN Moderate Pain (4 - 6)      CAPILLARY BLOOD GLUCOSE        I&O's Summary      PHYSICAL EXAM:  Vital Signs Last 24 Hrs  T(C): 36.9 (10 Aug 2023 05:20), Max: 36.9 (09 Aug 2023 22:25)  T(F): 98.4 (10 Aug 2023 05:20), Max: 98.4 (09 Aug 2023 22:25)  HR: 70 (10 Aug 2023 05:20) (70 - 88)  BP: 99/64 (10 Aug 2023 05:20) (99/64 - 113/70)  BP(mean): --  RR: 17 (10 Aug 2023 05:20) (17 - 17)  SpO2: 98% (10 Aug 2023 05:20) (96% - 98%)    Parameters below as of 10 Aug 2023 05:20  Patient On (Oxygen Delivery Method): room air        CONSTITUTIONAL: NAD, well-developed  HEET: MMM, EOMI, PERRLA  NECK: supple  RESPIRATORY: Normal respiratory effort; lungs are clear to auscultation bilaterally  CARDIOVASCULAR: Regular rate and rhythm, normal S1 and S2, no murmur/rub/gallop; No lower extremity edema; Peripheral pulses are 2+ bilaterally  ABDOMEN: Nontender to palpation, normoactive bowel sounds, no rebound/guarding; No hepatosplenomegaly  MUSCULOSKELETAL: no clubbing or cyanosis of digits; no joint swelling or tenderness to palpation  NEURO: 5/5 strength in upper and lower extremities b/l on flexion and extension. Diminished sensation in lower extremities b/l  PSYCH: A+O to person, place, and time; affect appropriate  SKIN: No rash    LABS:                      Tele Reviewed:    RADIOLOGY & ADDITIONAL TESTS:  Results Reviewed:   Imaging Personally Reviewed:  Electrocardiogram Personally Reviewed:

## 2023-08-10 NOTE — PROGRESS NOTE ADULT - ATTENDING COMMENTS
43F with PMH of sickle cell trait, asthma, peripheral neuropathy who presents s/p fall with ongoing weakness/paresthesia. Patient has undergone extensive workup including: MRI H/spine, LP w/ CSF studies does not indicate any neuro-related culprits. Hospital course notable for questionable seizure-like activities but EEG negative.  Further workup including echo, fat pad biopsy, paraneoplastic panel, CT A/P all wnl/ no explanation for sx. C/f conversion disorder given negative workup. S/p multiple bedside meetings with Neurology and primary team. Patient reporting sx have been worse since LP and patient reports new symptoms though on chart review, patient documented to have these spasms/erratic movements prior to LP ( see chart note 7/25). This was also noted by medical team. Activity was shared w/ movement disorder specialist and multiple neuro specialists as per neurology team - no causes can be identified based on videos of movement activities and current diagnostics, appears to be functional. As such - medical/neurological workup is complete.     # Spasms, paresthesias   OP Psych follow up. Patient can see movement specialist as OP if desired. Recommend continued PT/OT, PT to see patient today, and PMR to follow up re [facility] v [facility] and appropriate referrals to be made.   - Continue duloxetine 60 daily, lyrica and methocarbamol for symptomatic management - unclear benefit at this time. If signs she is not tolerating or developing adverse effect - would consider stopping.     # Liver lesions   - LFT's improving. Likely related to hepatic steatosis. Also noted to have indeterminate hypoechoic liver lesions. MRI recommended however patient required anesthesia for MRI brain. Recommend OP follow up with standing/open MRI or CT liver.     D/C planning to rehab- will need accepting facility.

## 2023-08-11 PROCEDURE — 99232 SBSQ HOSP IP/OBS MODERATE 35: CPT

## 2023-08-11 RX ADMIN — Medication 1 APPLICATION(S): at 17:58

## 2023-08-11 RX ADMIN — AMLODIPINE BESYLATE 5 MILLIGRAM(S): 2.5 TABLET ORAL at 06:48

## 2023-08-11 RX ADMIN — Medication 200 MILLIGRAM(S): at 17:58

## 2023-08-11 RX ADMIN — Medication 200 MILLIGRAM(S): at 06:48

## 2023-08-11 RX ADMIN — ENOXAPARIN SODIUM 40 MILLIGRAM(S): 100 INJECTION SUBCUTANEOUS at 10:46

## 2023-08-11 RX ADMIN — DULOXETINE HYDROCHLORIDE 60 MILLIGRAM(S): 30 CAPSULE, DELAYED RELEASE ORAL at 22:42

## 2023-08-11 RX ADMIN — METHOCARBAMOL 750 MILLIGRAM(S): 500 TABLET, FILM COATED ORAL at 06:48

## 2023-08-11 RX ADMIN — METHOCARBAMOL 750 MILLIGRAM(S): 500 TABLET, FILM COATED ORAL at 13:05

## 2023-08-11 RX ADMIN — METHOCARBAMOL 750 MILLIGRAM(S): 500 TABLET, FILM COATED ORAL at 22:42

## 2023-08-11 RX ADMIN — ENOXAPARIN SODIUM 40 MILLIGRAM(S): 100 INJECTION SUBCUTANEOUS at 22:42

## 2023-08-11 NOTE — PROGRESS NOTE ADULT - ATTENDING COMMENTS
43F with PMH of sickle cell trait, asthma, peripheral neuropathy who presents s/p fall with ongoing weakness/paresthesia. Patient has undergone extensive workup including: MRI H/spine, LP w/ CSF studies does not indicate any neuro-related culprits. Hospital course notable for questionable seizure-like activities but EEG negative.  Further workup including echo, fat pad biopsy, paraneoplastic panel, CT A/P all wnl/ no explanation for sx. C/f conversion disorder given negative workup. S/p multiple bedside meetings with Neurology and primary team. Patient reporting sx have been worse since LP and patient reports new symptoms though on chart review, patient documented to have these spasms/erratic movements prior to LP ( see chart note 7/25). This was also noted by medical team. Activity was shared w/ movement disorder specialist and multiple neuro specialists as per neurology team - no causes can be identified based on videos of movement activities and current diagnostics, appears to be functional. As such - medical/neurological workup is complete.     # Spasms, paresthesias   - OP Psych follow up. Patient can see movement specialist as OP if desired. PT now recommending [facility]. SW to send out [facility] referrals.   - Continue duloxetine 60 daily, lyrica and methocarbamol for symptomatic management - unclear benefit at this time. If signs she is not tolerating or developing adverse effect - would consider stopping.     # Liver lesions   - LFT's improving. Likely related to hepatic steatosis. Also noted to have indeterminate hypoechoic liver lesions. MRI recommended however patient required anesthesia for MRI brain. Recommend OP follow up with standing/open MRI or CT liver.     D/C planning to rehab- awaiting accepting facility

## 2023-08-11 NOTE — PROGRESS NOTE ADULT - SUBJECTIVE AND OBJECTIVE BOX
PROGRESS NOTE:   Authored by Dr. Bharat Venegas MD (PGY-1).     Patient is a 43y old  Female who presents with a chief complaint of Weakness in lower and upper extremities (10 Aug 2023 09:20)      SUBJECTIVE / OVERNIGHT EVENTS:  No acute events overnight. She has no acute complaints this morning.     MEDICATIONS  (STANDING):  amLODIPine   Tablet 5 milliGRAM(s) Oral daily  artificial  tears Solution 1 Drop(s) Both EYES four times a day  DULoxetine 60 milliGRAM(s) Oral at bedtime  enoxaparin Injectable 40 milliGRAM(s) SubCutaneous every 12 hours  lidocaine   4% Patch 1 Patch Transdermal daily  methocarbamol 750 milliGRAM(s) Oral three times a day  nystatin/triamcinolone Ointment 1 Application(s) Topical two times a day  polyethylene glycol 3350 17 Gram(s) Oral daily  pregabalin 200 milliGRAM(s) Oral two times a day    MEDICATIONS  (PRN):  diphenhydrAMINE 50 milliGRAM(s) Oral once PRN Anxiety  diphenhydrAMINE Injectable 25 milliGRAM(s) IV Push every 6 hours PRN Rash and/or Itching  LORazepam   Injectable 2 milliGRAM(s) IV Push once PRN Anxiety  traMADol 50 milliGRAM(s) Oral every 6 hours PRN Severe Pain (7 - 10)  traMADol 25 milliGRAM(s) Oral every 6 hours PRN Moderate Pain (4 - 6)      CAPILLARY BLOOD GLUCOSE        I&O's Summary      PHYSICAL EXAM:  Vital Signs Last 24 Hrs  T(C): 36.4 (11 Aug 2023 06:32), Max: 37.3 (10 Aug 2023 22:17)  T(F): 97.6 (11 Aug 2023 06:32), Max: 99.2 (10 Aug 2023 22:17)  HR: 75 (11 Aug 2023 06:32) (75 - 98)  BP: 105/64 (11 Aug 2023 06:32) (105/64 - 134/88)  BP(mean): --  RR: 18 (11 Aug 2023 06:32) (17 - 18)  SpO2: 100% (11 Aug 2023 06:32) (95% - 100%)    Parameters below as of 11 Aug 2023 06:32  Patient On (Oxygen Delivery Method): room air        CONSTITUTIONAL: NAD, well-developed  HEET: MMM, EOMI, PERRLA  NECK: supple  RESPIRATORY: Normal respiratory effort; lungs are clear to auscultation bilaterally  CARDIOVASCULAR: Regular rate and rhythm, normal S1 and S2, no murmur/rub/gallop; No lower extremity edema; Peripheral pulses are 2+ bilaterally  ABDOMEN: Nontender to palpation, normoactive bowel sounds, no rebound/guarding; No hepatosplenomegaly  MUSCULOSKELETAL: no clubbing or cyanosis of digits; no joint swelling or tenderness to palpation  NEURO: 5/5 strength in upper and lower extremities b/l on flexion and extension. Diminished sensation in lower extremities b/l  PSYCH: A+O to person, place, and time; affect appropriate  SKIN: No rash    LABS:                      Tele Reviewed:    RADIOLOGY & ADDITIONAL TESTS:  Results Reviewed:   Imaging Personally Reviewed:  Electrocardiogram Personally Reviewed:

## 2023-08-11 NOTE — CHART NOTE - NSCHARTNOTEFT_GEN_A_CORE
Nutrition f/u. 43 year old female with a PMH of peripheral neuropathy, sickle cell trait, asthma and anemia who was admitted after suffering a fall with inability to get up due to 4 months of ongoing bilateral lower and upper extremity weakness and paresthesias, pending rehab per chart    Patient w/ good appetite. Intakes are % per RN flow sheet. No GI distress. No edema or pressure injuries noted per RN flow sheet.    Diet : Diet, Regular (08-01-23 @ 12:56)    Current Weight: 110.7 kg (8/1)  121 kg (7/19)-?accuracy  110.7 kg (7/12)    Pertinent Medications: MEDICATIONS  (STANDING):  amLODIPine   Tablet 5 milliGRAM(s) Oral daily  artificial  tears Solution 1 Drop(s) Both EYES four times a day  DULoxetine 60 milliGRAM(s) Oral at bedtime  enoxaparin Injectable 40 milliGRAM(s) SubCutaneous every 12 hours  lidocaine   4% Patch 1 Patch Transdermal daily  methocarbamol 750 milliGRAM(s) Oral three times a day  nystatin/triamcinolone Ointment 1 Application(s) Topical two times a day  polyethylene glycol 3350 17 Gram(s) Oral daily  pregabalin 200 milliGRAM(s) Oral two times a day    MEDICATIONS  (PRN):  diphenhydrAMINE 50 milliGRAM(s) Oral once PRN Anxiety  diphenhydrAMINE Injectable 25 milliGRAM(s) IV Push every 6 hours PRN Rash and/or Itching  LORazepam   Injectable 2 milliGRAM(s) IV Push once PRN Anxiety  traMADol 50 milliGRAM(s) Oral every 6 hours PRN Severe Pain (7 - 10)  traMADol 25 milliGRAM(s) Oral every 6 hours PRN Moderate Pain (4 - 6)    Pertinent Labs:   08-07 Phos 4.1 mg/dL 08-07 Alb 3.4 g/dL    Estimated Needs:   [ x] no change since previous assessment  [ ] recalculated:     Previous Nutrition Diagnosis: No active nutrition diagnosis at this time    Nutrition Diagnosis is [x ] ongoing  [ ] resolved [ ] not applicable     Education:    [  ] Given today    Type of education provided:    [  ] Given on previous assessment by RD    [  ] Not applicable 2/2 cognitive deficit    [  ] Pt refusal of education offered    [  ] Not applicable 2/2 current prognosis    [x  ] Not warranted at present    Recommend  - continue diet as ordered  - obtain weekly weight and document PO intake to monitor trend    Monitoring and Evaluation:     [x ] PO intake [x ] Tolerance to diet prescription [x ] weights [ x] follow up per protocol    Renzo Beck, [id number] or TEAMS

## 2023-08-11 NOTE — PROGRESS NOTE ADULT - PROBLEM SELECTOR PLAN 1
Ascending weakness and paresthesias, beginning in 3/2023  EMG: No electrophysiologic evidence of a motor polyradiculoneuropathy - findings are not consistent with a diagnosis of CIDP  MRI head/cervical/thoracic: Mid cervical degenerative disc disease, C4-C5 broad irregular right central and C5-C6 focal right foraminal disc protrusion (disc herniation) that cause ventral cord deformity. Not consistent with clinical presentation per neurosurgery    Plan:  - LP under sedation performed 7/27; CSF studies have been unremarkable so far  - MRI under sedation completed 7/26/23- unremarkable findings   - Also started on robaxin 750mg TID and motrin 600mg and duloxetine 60mg for additional pain control  - Neurology following; appreciate recs  - EEG, TTE normal, CT chest normal   - fatpad biopsy done 8/1; path results did not show amyloidosis  - CSF oligoclonal bands neg   - f/u transthyretin, sf, paraneoplastic studies   -  follow up 8/9; recommend outpatient neuro f/u

## 2023-08-12 PROCEDURE — 99231 SBSQ HOSP IP/OBS SF/LOW 25: CPT | Mod: GC

## 2023-08-12 RX ADMIN — Medication 200 MILLIGRAM(S): at 05:56

## 2023-08-12 RX ADMIN — Medication 1 APPLICATION(S): at 17:56

## 2023-08-12 RX ADMIN — METHOCARBAMOL 750 MILLIGRAM(S): 500 TABLET, FILM COATED ORAL at 05:53

## 2023-08-12 RX ADMIN — Medication 1 APPLICATION(S): at 05:52

## 2023-08-12 RX ADMIN — ENOXAPARIN SODIUM 40 MILLIGRAM(S): 100 INJECTION SUBCUTANEOUS at 22:21

## 2023-08-12 RX ADMIN — METHOCARBAMOL 750 MILLIGRAM(S): 500 TABLET, FILM COATED ORAL at 13:25

## 2023-08-12 RX ADMIN — Medication 1 DROP(S): at 00:00

## 2023-08-12 RX ADMIN — DULOXETINE HYDROCHLORIDE 60 MILLIGRAM(S): 30 CAPSULE, DELAYED RELEASE ORAL at 22:21

## 2023-08-12 RX ADMIN — ENOXAPARIN SODIUM 40 MILLIGRAM(S): 100 INJECTION SUBCUTANEOUS at 10:37

## 2023-08-12 RX ADMIN — Medication 200 MILLIGRAM(S): at 17:56

## 2023-08-12 RX ADMIN — METHOCARBAMOL 750 MILLIGRAM(S): 500 TABLET, FILM COATED ORAL at 22:21

## 2023-08-12 NOTE — PROGRESS NOTE ADULT - ASSESSMENT
Ms Perez is a 42yo woman with a hx of peripheral neuropathy, sickle cell trait, asthma, and anemia who was admitted after suffering a fall with inability to get up due to 4 months of ongoing bilateral lower and upper extremity weakness and paresthesias. Homocysteine slightly elevated however rest of lab workup unremarkable so far. EMG not consistent with CIDP. MRI head, C/T spine completed 7/21 showing C4-C6 disc protrusions causing ventral cord deformity, not consistent with clinical presentation. Ms Perez is a 42yo woman with a hx of peripheral neuropathy, sickle cell trait, asthma, and anemia who was admitted after suffering a fall with inability to get up due to 4 months of ongoing bilateral lower and upper extremity weakness and paresthesias. Homocysteine slightly elevated however rest of lab workup unremarkable so far. EMG not consistent with CIDP. MRI head, C/T spine completed 7/21 showing C4-C6 disc protrusions causing ventral cord deformity, not consistent with clinical presentation. Currently pending BOOM placement.

## 2023-08-12 NOTE — PROGRESS NOTE ADULT - SUBJECTIVE AND OBJECTIVE BOX
Angelina Alfredo MD  PGY 2 Department of Internal Medicine        Patient is a 43y old  Female who presents with a chief complaint of Weakness in lower and upper extremities (11 Aug 2023 09:03)      SUBJECTIVE / OVERNIGHT EVENTS: Pt seen and examined. No acute overnight events. Denies fevers, chills, CP, SOB, Abdominal pain, N/V, Constipation, Diarrhea        MEDICATIONS  (STANDING):  amLODIPine   Tablet 5 milliGRAM(s) Oral daily  artificial  tears Solution 1 Drop(s) Both EYES four times a day  DULoxetine 60 milliGRAM(s) Oral at bedtime  enoxaparin Injectable 40 milliGRAM(s) SubCutaneous every 12 hours  lidocaine   4% Patch 1 Patch Transdermal daily  methocarbamol 750 milliGRAM(s) Oral three times a day  nystatin/triamcinolone Ointment 1 Application(s) Topical two times a day  polyethylene glycol 3350 17 Gram(s) Oral daily  pregabalin 200 milliGRAM(s) Oral two times a day    MEDICATIONS  (PRN):  diphenhydrAMINE 50 milliGRAM(s) Oral once PRN Anxiety  diphenhydrAMINE Injectable 25 milliGRAM(s) IV Push every 6 hours PRN Rash and/or Itching  LORazepam   Injectable 2 milliGRAM(s) IV Push once PRN Anxiety  traMADol 50 milliGRAM(s) Oral every 6 hours PRN Severe Pain (7 - 10)  traMADol 25 milliGRAM(s) Oral every 6 hours PRN Moderate Pain (4 - 6)      I&O's Summary    11 Aug 2023 07:01  -  12 Aug 2023 07:00  --------------------------------------------------------  IN: 0 mL / OUT: 900 mL / NET: -900 mL        Vital Signs Last 24 Hrs  T(C): 36.6 (12 Aug 2023 05:40), Max: 36.7 (11 Aug 2023 12:30)  T(F): 97.8 (12 Aug 2023 05:40), Max: 98.1 (11 Aug 2023 12:30)  HR: 74 (12 Aug 2023 05:40) (74 - 88)  BP: 107/72 (12 Aug 2023 05:40) (100/63 - 110/74)  BP(mean): --  RR: 17 (12 Aug 2023 05:40) (17 - 17)  SpO2: 97% (12 Aug 2023 05:40) (96% - 98%)    Parameters below as of 12 Aug 2023 05:40  Patient On (Oxygen Delivery Method): room air        CAPILLARY BLOOD GLUCOSE          PHYSICAL EXAM:  GENERAL: NAD,   HEAD:  Atraumatic, Normocephalic  EYES: EOMI, PERRL, conjunctiva and sclera clear  NECK: No JVD  CHEST/LUNG: Clear to auscultation bilaterally; No wheeze  HEART: Regular rate and rhythm; No murmurs, rubs, or gallops  ABDOMEN: Soft, Nontender, Nondistended; Bowel sounds present  EXTREMITIES:  2+ Peripheral Pulses, No clubbing, cyanosis, or edema  PSYCH: AAOx3  NEUROLOGY: non-focal  SKIN: No rashes or lesions       LABS:                      RADIOLOGY & ADDITIONAL TESTS:    Imaging Personally Reviewed:    Consultant(s) Notes Reviewed:      Care Discussed with Consultants/Other Providers:   Angelina Alfredo MD  PGY 2 Department of Internal Medicine        Patient is a 43y old  Female who presents with a chief complaint of Weakness in lower and upper extremities (11 Aug 2023 09:03)      SUBJECTIVE / OVERNIGHT EVENTS: Pt seen and examined. No acute overnight events. Still w/ complaint of paresthesia in extremities bilaterally. Denies fevers, chills, CP, SOB, Abdominal pain, N/V, Constipation, Diarrhea        MEDICATIONS  (STANDING):  amLODIPine   Tablet 5 milliGRAM(s) Oral daily  artificial  tears Solution 1 Drop(s) Both EYES four times a day  DULoxetine 60 milliGRAM(s) Oral at bedtime  enoxaparin Injectable 40 milliGRAM(s) SubCutaneous every 12 hours  lidocaine   4% Patch 1 Patch Transdermal daily  methocarbamol 750 milliGRAM(s) Oral three times a day  nystatin/triamcinolone Ointment 1 Application(s) Topical two times a day  polyethylene glycol 3350 17 Gram(s) Oral daily  pregabalin 200 milliGRAM(s) Oral two times a day    MEDICATIONS  (PRN):  diphenhydrAMINE 50 milliGRAM(s) Oral once PRN Anxiety  diphenhydrAMINE Injectable 25 milliGRAM(s) IV Push every 6 hours PRN Rash and/or Itching  LORazepam   Injectable 2 milliGRAM(s) IV Push once PRN Anxiety  traMADol 50 milliGRAM(s) Oral every 6 hours PRN Severe Pain (7 - 10)  traMADol 25 milliGRAM(s) Oral every 6 hours PRN Moderate Pain (4 - 6)      I&O's Summary    11 Aug 2023 07:01  -  12 Aug 2023 07:00  --------------------------------------------------------  IN: 0 mL / OUT: 900 mL / NET: -900 mL        Vital Signs Last 24 Hrs  T(C): 36.6 (12 Aug 2023 05:40), Max: 36.7 (11 Aug 2023 12:30)  T(F): 97.8 (12 Aug 2023 05:40), Max: 98.1 (11 Aug 2023 12:30)  HR: 74 (12 Aug 2023 05:40) (74 - 88)  BP: 107/72 (12 Aug 2023 05:40) (100/63 - 110/74)  BP(mean): --  RR: 17 (12 Aug 2023 05:40) (17 - 17)  SpO2: 97% (12 Aug 2023 05:40) (96% - 98%)    Parameters below as of 12 Aug 2023 05:40  Patient On (Oxygen Delivery Method): room air        CAPILLARY BLOOD GLUCOSE          PHYSICAL EXAM:  GENERAL: NAD,   HEAD:  Atraumatic, Normocephalic  EYES: EOMI, PERRL, conjunctiva and sclera clear  NECK: No JVD  CHEST/LUNG: Clear to auscultation bilaterally; No wheeze  HEART: Regular rate and rhythm; No murmurs, rubs, or gallops  ABDOMEN: Soft, Nontender, Nondistended; Bowel sounds present  EXTREMITIES:  2+ Peripheral Pulses, No clubbing, cyanosis, or edema  PSYCH: AAOx3  NEUROLOGY: non-focal, diminished sensation in lower extremities b/l  SKIN: No rashes or lesions       LABS:                      RADIOLOGY & ADDITIONAL TESTS:    Imaging Personally Reviewed:    Consultant(s) Notes Reviewed:      Care Discussed with Consultants/Other Providers:

## 2023-08-12 NOTE — PROGRESS NOTE ADULT - ATTENDING COMMENTS
43F with PMH of sickle cell trait, asthma, peripheral neuropathy who presents s/p fall with ongoing weakness/paresthesia. Patient has undergone extensive workup including: MRI H/spine, LP w/ CSF studies does not indicate any neuro-related culprits. Hospital course notable for questionable seizure-like activities but EEG negative.  Further workup including echo, fat pad biopsy, paraneoplastic panel, CT A/P all wnl/ no explanation for sx. C/f conversion disorder given negative workup. S/p multiple bedside meetings with Neurology and primary team. Patient reporting sx have been worse since LP and patient reports new symptoms though on chart review, patient documented to have these spasms/erratic movements prior to LP ( see chart note 7/25). This was also noted by medical team. Activity was shared w/ movement disorder specialist and multiple neuro specialists as per neurology team - no causes can be identified based on videos of movement activities and current diagnostics, appears to be functional. As such - medical/neurological workup is complete.     # Spasms, paresthesias   - OP Psych follow up. Patient can see movement specialist as OP if desired. PT now recommending [facility]. SW to send out [facility] referrals.   - Continue duloxetine 60 daily, lyrica and methocarbamol for symptomatic management - unclear benefit at this time. If signs she is not tolerating or developing adverse effect - would consider stopping.     # Liver lesions   - LFT's improving. Likely related to hepatic steatosis. Also noted to have indeterminate hypoechoic liver lesions. MRI recommended however patient required anesthesia for MRI brain. Recommend OP follow up with standing/open MRI or CT liver.     D/C planning to rehab- awaiting accepting facility . 43F with PMH of sickle cell trait, asthma, peripheral neuropathy who presents s/p fall with ongoing weakness/paresthesia. Patient has undergone extensive workup including: MRI H/spine, LP w/ CSF studies does not indicate any neuro-related culprits. Hospital course notable for questionable seizure-like activities but EEG negative.  Further workup including echo, fat pad biopsy, paraneoplastic panel, CT A/P all wnl/ no explanation for sx. C/f conversion disorder given negative workup. S/p multiple bedside meetings with Neurology and primary team. Patient reporting sx have been worse since LP and patient reports new symptoms though on chart review, patient documented to have these spasms/erratic movements prior to LP ( see chart note 7/25). This was also noted by medical team. Activity was shared w/ movement disorder specialist and multiple neuro specialists as per neurology team - no causes can be identified based on videos of movement activities and current diagnostics, appears to be functional. As such - medical/neurological workup is complete.     Pt endorsing she wants acute rehab    # Spasms, paresthesias   - OP Psych follow up. Patient can see movement specialist as OP if desired. PT now recommending [facility]. SW to send out [facility] referrals.   - Continue duloxetine 60 daily, lyrica and methocarbamol for symptomatic management - unclear benefit at this time. If signs she is not tolerating or developing adverse effect - would consider stopping.     # Liver lesions   - LFT's improving. Likely related to hepatic steatosis. Also noted to have indeterminate hypoechoic liver lesions. MRI recommended however patient required anesthesia for MRI brain. Recommend OP follow up with standing/open MRI or CT liver.     D/C planning to rehab- awaiting accepting facility . 43F with PMH of sickle cell trait, asthma, peripheral neuropathy who presents s/p fall with ongoing weakness/paresthesia. Patient has undergone extensive workup including: MRI H/spine, LP w/ CSF studies does not indicate any neuro-related culprits. Hospital course notable for questionable seizure-like activities but EEG negative.  Further workup including echo, fat pad biopsy, paraneoplastic panel, CT A/P all wnl/ no explanation for sx. C/f conversion disorder given negative workup. S/p multiple bedside meetings with Neurology and primary team. Patient reporting sx have been worse since LP and patient reports new symptoms though on chart review, patient documented to have these spasms/erratic movements prior to LP ( see chart note 7/25). This was also noted by medical team. Activity was shared w/ movement disorder specialist and multiple neuro specialists as per neurology team - no causes can be identified based on videos of movement activities and current diagnostics, appears to be functional. As such - medical/neurological workup is complete.       # Spasms, paresthesias   - OP Psych follow up. Patient can see movement specialist as OP if desired. PT now recommending [facility]. Will send auth for [facility]   - Patient is interested in Acute rehab- awaiting updated PM&R reccs- have reached out to office directly.   - Continue duloxetine 60 daily, lyrica and methocarbamol for symptomatic management - unclear benefit at this time. If signs she is not tolerating or developing adverse effect - would consider stopping.     # Liver lesions   - LFT's improving. Likely related to hepatic steatosis. Also noted to have indeterminate hypoechoic liver lesions. MRI recommended however patient required anesthesia for MRI brain. Recommend OP follow up with standing/open MRI or CT liver.     D/C planning to rehab- awaiting accepting facility .

## 2023-08-13 PROCEDURE — 99231 SBSQ HOSP IP/OBS SF/LOW 25: CPT

## 2023-08-13 RX ADMIN — Medication 200 MILLIGRAM(S): at 18:20

## 2023-08-13 RX ADMIN — Medication 200 MILLIGRAM(S): at 05:44

## 2023-08-13 RX ADMIN — METHOCARBAMOL 750 MILLIGRAM(S): 500 TABLET, FILM COATED ORAL at 05:44

## 2023-08-13 RX ADMIN — ENOXAPARIN SODIUM 40 MILLIGRAM(S): 100 INJECTION SUBCUTANEOUS at 21:26

## 2023-08-13 RX ADMIN — DULOXETINE HYDROCHLORIDE 60 MILLIGRAM(S): 30 CAPSULE, DELAYED RELEASE ORAL at 21:26

## 2023-08-13 RX ADMIN — METHOCARBAMOL 750 MILLIGRAM(S): 500 TABLET, FILM COATED ORAL at 21:26

## 2023-08-13 RX ADMIN — ENOXAPARIN SODIUM 40 MILLIGRAM(S): 100 INJECTION SUBCUTANEOUS at 11:45

## 2023-08-13 RX ADMIN — Medication 1 APPLICATION(S): at 18:03

## 2023-08-13 RX ADMIN — METHOCARBAMOL 750 MILLIGRAM(S): 500 TABLET, FILM COATED ORAL at 14:24

## 2023-08-13 NOTE — PROGRESS NOTE ADULT - SUBJECTIVE AND OBJECTIVE BOX
PROGRESS NOTE:   Authored by Dr. Bharat Venegas MD (PGY-1).     Patient is a 43y old  Female who presents with a chief complaint of Weakness in lower and upper extremities (12 Aug 2023 07:33)      SUBJECTIVE / OVERNIGHT EVENTS:  No acute events overnight. She has no acute complaints this morning.     MEDICATIONS  (STANDING):  amLODIPine   Tablet 5 milliGRAM(s) Oral daily  artificial  tears Solution 1 Drop(s) Both EYES four times a day  DULoxetine 60 milliGRAM(s) Oral at bedtime  enoxaparin Injectable 40 milliGRAM(s) SubCutaneous every 12 hours  lidocaine   4% Patch 1 Patch Transdermal daily  methocarbamol 750 milliGRAM(s) Oral three times a day  nystatin/triamcinolone Ointment 1 Application(s) Topical two times a day  polyethylene glycol 3350 17 Gram(s) Oral daily  pregabalin 200 milliGRAM(s) Oral two times a day    MEDICATIONS  (PRN):  diphenhydrAMINE 50 milliGRAM(s) Oral once PRN Anxiety  diphenhydrAMINE Injectable 25 milliGRAM(s) IV Push every 6 hours PRN Rash and/or Itching  LORazepam   Injectable 2 milliGRAM(s) IV Push once PRN Anxiety  traMADol 50 milliGRAM(s) Oral every 6 hours PRN Severe Pain (7 - 10)  traMADol 25 milliGRAM(s) Oral every 6 hours PRN Moderate Pain (4 - 6)      CAPILLARY BLOOD GLUCOSE        I&O's Summary      PHYSICAL EXAM:  Vital Signs Last 24 Hrs  T(C): 36.6 (13 Aug 2023 05:35), Max: 36.7 (12 Aug 2023 12:51)  T(F): 97.8 (13 Aug 2023 05:35), Max: 98.1 (12 Aug 2023 22:00)  HR: 74 (13 Aug 2023 05:35) (73 - 87)  BP: 108/66 (13 Aug 2023 05:35) (105/60 - 111/85)  BP(mean): --  RR: 17 (13 Aug 2023 05:35) (16 - 18)  SpO2: 98% (13 Aug 2023 05:35) (97% - 98%)    Parameters below as of 13 Aug 2023 05:35  Patient On (Oxygen Delivery Method): room air        CONSTITUTIONAL: NAD, well-developed  HEET: MMM, EOMI, PERRLA  NECK: supple  RESPIRATORY: Normal respiratory effort; lungs are clear to auscultation bilaterally  CARDIOVASCULAR: Regular rate and rhythm, normal S1 and S2, no murmur/rub/gallop; No lower extremity edema; Peripheral pulses are 2+ bilaterally  ABDOMEN: Nontender to palpation, normoactive bowel sounds, no rebound/guarding; No hepatosplenomegaly  MUSCULOSKELETAL: no clubbing or cyanosis of digits; no joint swelling or tenderness to palpation  NEURO: 5/5 strength on flexion and extension in all extremities Diminished sensation in lower extremities b/l  PSYCH: A+O to person, place, and time;     LABS:                      Tele Reviewed:    RADIOLOGY & ADDITIONAL TESTS:  Results Reviewed:   Imaging Personally Reviewed:  Electrocardiogram Personally Reviewed:

## 2023-08-13 NOTE — PROGRESS NOTE ADULT - ASSESSMENT
Ms Perez is a 44yo woman with a hx of peripheral neuropathy, sickle cell trait, asthma, and anemia who was admitted after suffering a fall with inability to get up due to 4 months of ongoing bilateral lower and upper extremity weakness and paresthesias. Homocysteine slightly elevated however rest of lab workup unremarkable so far. EMG not consistent with CIDP. MRI head, C/T spine completed 7/21 showing C4-C6 disc protrusions causing ventral cord deformity, not consistent with clinical presentation. Currently pending [facility] placement.

## 2023-08-13 NOTE — PROGRESS NOTE ADULT - ATTENDING COMMENTS
43F with PMH of sickle cell trait, asthma, peripheral neuropathy who presents s/p fall with ongoing weakness/paresthesia. Patient has undergone extensive workup including: MRI H/spine, LP w/ CSF studies does not indicate any neuro-related culprits. Hospital course notable for questionable seizure-like activities but EEG negative.  Further workup including echo, fat pad biopsy, paraneoplastic panel, CT A/P all wnl/ no explanation for sx. C/f conversion disorder given negative workup. S/p multiple bedside meetings with Neurology and primary team. Patient reporting sx have been worse since LP and patient reports new symptoms though on chart review, patient documented to have these spasms/erratic movements prior to LP ( see chart note 7/25). This was also noted by medical team. Activity was shared w/ movement disorder specialist and multiple neuro specialists as per neurology team - no causes can be identified based on videos of movement activities and current diagnostics, appears to be functional. As such - medical/neurological workup is complete.     Pt endorsing she wants acute rehab. No spasm    # Spasms, paresthesias   - OP Psych follow up. Patient can see movement specialist as OP if desired. PT now recommending [facility]. SW to send out [facility] referrals.   - Continue duloxetine 60 daily, lyrica and methocarbamol for symptomatic management - unclear benefit at this time. If signs she is not tolerating or developing adverse effect - would consider stopping.     # Liver lesions   - LFT's improving. Likely related to hepatic steatosis. Also noted to have indeterminate hypoechoic liver lesions. MRI recommended however patient required anesthesia for MRI brain. Recommend OP follow up with standing/open MRI or CT liver.     D/C planning to rehab- awaiting accepting facility .

## 2023-08-14 PROCEDURE — 99232 SBSQ HOSP IP/OBS MODERATE 35: CPT | Mod: GC

## 2023-08-14 RX ADMIN — DULOXETINE HYDROCHLORIDE 60 MILLIGRAM(S): 30 CAPSULE, DELAYED RELEASE ORAL at 21:08

## 2023-08-14 RX ADMIN — Medication 200 MILLIGRAM(S): at 18:02

## 2023-08-14 RX ADMIN — METHOCARBAMOL 750 MILLIGRAM(S): 500 TABLET, FILM COATED ORAL at 05:55

## 2023-08-14 RX ADMIN — Medication 200 MILLIGRAM(S): at 06:00

## 2023-08-14 RX ADMIN — ENOXAPARIN SODIUM 40 MILLIGRAM(S): 100 INJECTION SUBCUTANEOUS at 10:29

## 2023-08-14 RX ADMIN — METHOCARBAMOL 750 MILLIGRAM(S): 500 TABLET, FILM COATED ORAL at 13:51

## 2023-08-14 RX ADMIN — METHOCARBAMOL 750 MILLIGRAM(S): 500 TABLET, FILM COATED ORAL at 21:07

## 2023-08-14 RX ADMIN — Medication 1 APPLICATION(S): at 18:05

## 2023-08-14 RX ADMIN — ENOXAPARIN SODIUM 40 MILLIGRAM(S): 100 INJECTION SUBCUTANEOUS at 21:08

## 2023-08-14 NOTE — PROGRESS NOTE ADULT - ASSESSMENT
Ms Perez is a 42yo woman with a hx of peripheral neuropathy, sickle cell trait, asthma, and anemia who was admitted after suffering a fall with inability to get up due to 4 months of ongoing bilateral lower and upper extremity weakness and paresthesias. Homocysteine slightly elevated however rest of lab workup unremarkable so far. EMG not consistent with CIDP. MRI head, C/T spine completed 7/21 showing C4-C6 disc protrusions causing ventral cord deformity, not consistent with clinical presentation. Currently pending [facility] placement.

## 2023-08-14 NOTE — PROGRESS NOTE ADULT - ATTENDING COMMENTS
43F with PMH of sickle cell trait, asthma, peripheral neuropathy who presents s/p fall with ongoing weakness/paresthesia. Patient has undergone extensive workup including: MRI H/spine, LP w/ CSF studies does not indicate any neuro-related culprits. Hospital course notable for questionable seizure-like activities but EEG negative.  Further workup including echo, fat pad biopsy, paraneoplastic panel, CT A/P all wnl/ no explanation for sx. C/f conversion disorder given negative workup. S/p multiple bedside meetings with Neurology and primary team. Patient reporting sx have been worse since LP and patient reports new symptoms though on chart review, patient documented to have these spasms/erratic movements prior to LP ( see chart note 7/25). This was also noted by medical team. Activity was shared w/ movement disorder specialist and multiple neuro specialists as per neurology team - no causes can be identified based on videos of movement activities and current diagnostics, appears to be functional. As such - medical/neurological workup is complete.     Pt endorsing she wants acute rehab. No spasms noted on exam.     # Spasms, paresthesias   - OP Psych follow up. Patient can see movement specialist as OP if desired. Pt w/ [facility] acceptances- asked SW to send for auth. PMR to follow up.   - Continue duloxetine 60 daily, lyrica and methocarbamol for symptomatic management - unclear benefit at this time. If signs she is not tolerating or developing adverse effect - would consider stopping.     # Liver lesions   - LFT's improving. Likely related to hepatic steatosis. Also noted to have indeterminate hypoechoic liver lesions. MRI recommended however patient required anesthesia for MRI brain. Recommend OP follow up with standing/open MRI or CT liver.     D/C planning to rehab- awaiting accepting facility .

## 2023-08-15 LAB
GENE STUDIED ID: SIGNIFICANT CHANGE UP
LAB TEST METHOD: SIGNIFICANT CHANGE UP
TEST PERFORMANCE INFO SPEC: SIGNIFICANT CHANGE UP
TEST PERFORMANCE INFO SPEC: SIGNIFICANT CHANGE UP
TTR GENE INTERPRETATION: SIGNIFICANT CHANGE UP
TTR GENE METHOD: SIGNIFICANT CHANGE UP
TTR GENE RELEASED BY: SIGNIFICANT CHANGE UP
TTR GENE RESULT SUMMARY: NEGATIVE — SIGNIFICANT CHANGE UP
TTR GENE RESULT: SIGNIFICANT CHANGE UP
TTR GENE SPECIMEN: SIGNIFICANT CHANGE UP

## 2023-08-15 PROCEDURE — 99232 SBSQ HOSP IP/OBS MODERATE 35: CPT | Mod: GC

## 2023-08-15 RX ADMIN — ENOXAPARIN SODIUM 40 MILLIGRAM(S): 100 INJECTION SUBCUTANEOUS at 21:42

## 2023-08-15 RX ADMIN — METHOCARBAMOL 750 MILLIGRAM(S): 500 TABLET, FILM COATED ORAL at 13:10

## 2023-08-15 RX ADMIN — METHOCARBAMOL 750 MILLIGRAM(S): 500 TABLET, FILM COATED ORAL at 21:42

## 2023-08-15 RX ADMIN — Medication 1 APPLICATION(S): at 18:46

## 2023-08-15 RX ADMIN — METHOCARBAMOL 750 MILLIGRAM(S): 500 TABLET, FILM COATED ORAL at 05:26

## 2023-08-15 RX ADMIN — DULOXETINE HYDROCHLORIDE 60 MILLIGRAM(S): 30 CAPSULE, DELAYED RELEASE ORAL at 21:42

## 2023-08-15 RX ADMIN — ENOXAPARIN SODIUM 40 MILLIGRAM(S): 100 INJECTION SUBCUTANEOUS at 13:10

## 2023-08-15 RX ADMIN — Medication 1 DROP(S): at 18:46

## 2023-08-15 RX ADMIN — Medication 200 MILLIGRAM(S): at 18:45

## 2023-08-15 RX ADMIN — Medication 200 MILLIGRAM(S): at 05:26

## 2023-08-15 NOTE — PROGRESS NOTE ADULT - ASSESSMENT
Ms Perez is a 42yo woman with a hx of peripheral neuropathy, sickle cell trait, asthma, and anemia who was admitted after suffering a fall with inability to get up due to 4 months of ongoing bilateral lower and upper extremity weakness and paresthesias. Homocysteine slightly elevated however rest of lab workup unremarkable so far. EMG not consistent with CIDP. MRI head, C/T spine completed 7/21 showing C4-C6 disc protrusions causing ventral cord deformity, not consistent with clinical presentation. Currently pending acute rehab  placement.

## 2023-08-15 NOTE — PROGRESS NOTE ADULT - ATTENDING COMMENTS
43F with PMH of sickle cell trait, asthma, peripheral neuropathy who presents s/p fall with ongoing weakness/paresthesia. Patient has undergone extensive workup including: MRI H/spine, LP w/ CSF studies does not indicate any neuro-related culprits. Hospital course notable for questionable seizure-like activities but EEG negative.  Further workup including echo, fat pad biopsy, paraneoplastic panel, CT A/P all wnl/ no explanation for sx. C/f conversion disorder given negative workup. S/p multiple bedside meetings with Neurology and primary team. Patient reporting sx have been worse since LP and patient reports new symptoms though on chart review, patient documented to have these spasms/erratic movements prior to LP ( see chart note 7/25). This was also noted by medical team. Activity was shared w/ movement disorder specialist and multiple neuro specialists as per neurology team - no causes can be identified based on videos of movement activities and current diagnostics, appears to be functional. As such - medical/neurological workup is complete.     Pt endorsing she wants acute rehab.     # Spasms, paresthesias   - OP Psych follow up. Patient can see movement specialist as OP if desired. Pt w/ [facility] acceptances- asked SW to send for auth. PMR to follow up.   - Continue duloxetine 60 daily, lyrica and methocarbamol for symptomatic management - unclear benefit at this time. If signs she is not tolerating or developing adverse effect - would consider stopping.     # Liver lesions   - LFT's improving. Likely related to hepatic steatosis. Also noted to have indeterminate hypoechoic liver lesions. MRI recommended however patient required anesthesia for MRI brain. Recommend OP follow up with standing/open MRI or CT liver.     D/C planning to rehab- awaiting auth

## 2023-08-15 NOTE — PROGRESS NOTE ADULT - SUBJECTIVE AND OBJECTIVE BOX
PROGRESS NOTE:   Authored by Dr. Bharat Venegas MD (PGY-1).     Patient is a 43y old  Female who presents with a chief complaint of Weakness in lower and upper extremities (14 Aug 2023 08:58)      SUBJECTIVE / OVERNIGHT EVENTS:  No acute events overnight. She has no acute complaints this morning.     MEDICATIONS  (STANDING):  amLODIPine   Tablet 5 milliGRAM(s) Oral daily  artificial  tears Solution 1 Drop(s) Both EYES four times a day  DULoxetine 60 milliGRAM(s) Oral at bedtime  enoxaparin Injectable 40 milliGRAM(s) SubCutaneous every 12 hours  lidocaine   4% Patch 1 Patch Transdermal daily  methocarbamol 750 milliGRAM(s) Oral three times a day  nystatin/triamcinolone Ointment 1 Application(s) Topical two times a day  polyethylene glycol 3350 17 Gram(s) Oral daily  pregabalin 200 milliGRAM(s) Oral two times a day    MEDICATIONS  (PRN):  diphenhydrAMINE 50 milliGRAM(s) Oral once PRN Anxiety  diphenhydrAMINE Injectable 25 milliGRAM(s) IV Push every 6 hours PRN Rash and/or Itching  LORazepam   Injectable 2 milliGRAM(s) IV Push once PRN Anxiety  traMADol 25 milliGRAM(s) Oral every 6 hours PRN Moderate Pain (4 - 6)  traMADol 50 milliGRAM(s) Oral every 6 hours PRN Severe Pain (7 - 10)      CAPILLARY BLOOD GLUCOSE        I&O's Summary      PHYSICAL EXAM:  Vital Signs Last 24 Hrs  T(C): 36.7 (15 Aug 2023 04:31), Max: 36.7 (15 Aug 2023 04:31)  T(F): 98.1 (15 Aug 2023 04:31), Max: 98.1 (15 Aug 2023 04:31)  HR: 93 (15 Aug 2023 04:31) (83 - 95)  BP: 111/76 (15 Aug 2023 04:31) (110/69 - 116/75)  BP(mean): --  RR: 18 (15 Aug 2023 04:31) (18 - 18)  SpO2: 97% (15 Aug 2023 04:31) (95% - 99%)    Parameters below as of 15 Aug 2023 04:31  Patient On (Oxygen Delivery Method): room air        CONSTITUTIONAL: NAD, well-developed  HEET: MMM, EOMI, PERRLA  NECK: supple  RESPIRATORY: Normal respiratory effort; lungs are clear to auscultation bilaterally  CARDIOVASCULAR: Regular rate and rhythm, normal S1 and S2, no murmur/rub/gallop; No lower extremity edema; Peripheral pulses are 2+ bilaterally  ABDOMEN: Nontender to palpation, normoactive bowel sounds, no rebound/guarding; No hepatosplenomegaly  MUSCULOSKELETAL: 5/5 strength in all extremities on flexion and extension   NEURO: Diminished sensation in lower extremities   PSYCH: A+O to person, place, and time; affect appropriate  SKIN: No rash    LABS:                      Tele Reviewed:    RADIOLOGY & ADDITIONAL TESTS:  Results Reviewed:   Imaging Personally Reviewed:  Electrocardiogram Personally Reviewed:

## 2023-08-16 PROCEDURE — 99232 SBSQ HOSP IP/OBS MODERATE 35: CPT | Mod: GC

## 2023-08-16 PROCEDURE — 99232 SBSQ HOSP IP/OBS MODERATE 35: CPT

## 2023-08-16 RX ADMIN — ENOXAPARIN SODIUM 40 MILLIGRAM(S): 100 INJECTION SUBCUTANEOUS at 11:30

## 2023-08-16 RX ADMIN — DULOXETINE HYDROCHLORIDE 60 MILLIGRAM(S): 30 CAPSULE, DELAYED RELEASE ORAL at 22:04

## 2023-08-16 RX ADMIN — METHOCARBAMOL 750 MILLIGRAM(S): 500 TABLET, FILM COATED ORAL at 13:15

## 2023-08-16 RX ADMIN — Medication 200 MILLIGRAM(S): at 05:10

## 2023-08-16 RX ADMIN — METHOCARBAMOL 750 MILLIGRAM(S): 500 TABLET, FILM COATED ORAL at 22:04

## 2023-08-16 RX ADMIN — Medication 200 MILLIGRAM(S): at 17:27

## 2023-08-16 RX ADMIN — METHOCARBAMOL 750 MILLIGRAM(S): 500 TABLET, FILM COATED ORAL at 05:10

## 2023-08-16 RX ADMIN — Medication 1 APPLICATION(S): at 17:27

## 2023-08-16 RX ADMIN — ENOXAPARIN SODIUM 40 MILLIGRAM(S): 100 INJECTION SUBCUTANEOUS at 22:05

## 2023-08-16 RX ADMIN — Medication 1 APPLICATION(S): at 06:16

## 2023-08-16 NOTE — PROGRESS NOTE ADULT - SUBJECTIVE AND OBJECTIVE BOX
PROGRESS NOTE:   Authored by Dr. Bharat Venegas MD (PGY-1).     Patient is a 43y old  Female who presents with a chief complaint of Weakness in lower and upper extremities (15 Aug 2023 08:16)      SUBJECTIVE / OVERNIGHT EVENTS:  No acute events overnight. She has no acute complaints this morning.     MEDICATIONS  (STANDING):  amLODIPine   Tablet 5 milliGRAM(s) Oral daily  artificial  tears Solution 1 Drop(s) Both EYES four times a day  DULoxetine 60 milliGRAM(s) Oral at bedtime  enoxaparin Injectable 40 milliGRAM(s) SubCutaneous every 12 hours  lidocaine   4% Patch 1 Patch Transdermal daily  methocarbamol 750 milliGRAM(s) Oral three times a day  nystatin/triamcinolone Ointment 1 Application(s) Topical two times a day  polyethylene glycol 3350 17 Gram(s) Oral daily  pregabalin 200 milliGRAM(s) Oral two times a day    MEDICATIONS  (PRN):  diphenhydrAMINE 50 milliGRAM(s) Oral once PRN Anxiety  diphenhydrAMINE Injectable 25 milliGRAM(s) IV Push every 6 hours PRN Rash and/or Itching  LORazepam   Injectable 2 milliGRAM(s) IV Push once PRN Anxiety  traMADol 25 milliGRAM(s) Oral every 6 hours PRN Moderate Pain (4 - 6)      CAPILLARY BLOOD GLUCOSE        I&O's Summary      PHYSICAL EXAM:  Vital Signs Last 24 Hrs  T(C): 37.1 (16 Aug 2023 05:05), Max: 37.1 (16 Aug 2023 05:05)  T(F): 98.7 (16 Aug 2023 05:05), Max: 98.7 (16 Aug 2023 05:05)  HR: 72 (16 Aug 2023 05:05) (72 - 84)  BP: 112/80 (16 Aug 2023 05:05) (112/80 - 134/83)  BP(mean): --  RR: 17 (16 Aug 2023 05:05) (17 - 18)  SpO2: 100% (16 Aug 2023 05:05) (100% - 100%)    Parameters below as of 16 Aug 2023 05:05  Patient On (Oxygen Delivery Method): room air        CONSTITUTIONAL: NAD, well-developed  HEET: MMM, EOMI, PERRLA  NECK: supple  RESPIRATORY: Normal respiratory effort; lungs are clear to auscultation bilaterally  CARDIOVASCULAR: Regular rate and rhythm, normal S1 and S2, no murmur/rub/gallop; No lower extremity edema; Peripheral pulses are 2+ bilaterally  ABDOMEN: Nontender to palpation, normoactive bowel sounds, no rebound/guarding; No hepatosplenomegaly  MUSCULOSKELETAL: 5/5 strength in all extremities on flexion and extension   NEURO: Diminished sensation in lower extremities  PSYCH: A+O to person, place, and time; affect appropriate  SKIN: No rash    LABS:                      Tele Reviewed:    RADIOLOGY & ADDITIONAL TESTS:  Results Reviewed:   Imaging Personally Reviewed:  Electrocardiogram Personally Reviewed:

## 2023-08-16 NOTE — PROGRESS NOTE ADULT - SUBJECTIVE AND OBJECTIVE BOX
REVIEW OF SYSTEMS/Subjective: Patient in bed in NAD, no SOB, no n/v, (+)numbness BLE  Constitutional - No fever,  No fatigue  HEENT - No vertigo, No neck pain  Neurological - No headaches, No memory loss, No loss of strength, + numbness  Skin - No rashes, No lesions   Musculoskeletal - No joint pain, No joint swelling, No muscle pain  Psychiatric - No depression, No anxiety            MEDICATIONS   amLODIPine   Tablet 5 milliGRAM(s) daily  artificial  tears Solution 1 Drop(s) four times a day  diphenhydrAMINE 50 milliGRAM(s) once PRN  diphenhydrAMINE Injectable 25 milliGRAM(s) every 6 hours PRN  DULoxetine 60 milliGRAM(s) at bedtime  enoxaparin Injectable 40 milliGRAM(s) every 12 hours  lidocaine   4% Patch 1 Patch daily  LORazepam   Injectable 2 milliGRAM(s) once PRN  methocarbamol 750 milliGRAM(s) three times a day  morphine  - Injectable 2 milliGRAM(s) every 4 hours PRN  nystatin/triamcinolone Ointment 1 Application(s) two times a day  polyethylene glycol 3350 17 Gram(s) daily  pregabalin 200 milliGRAM(s) two times a day  traMADol 50 milliGRAM(s) every 6 hours PRN  traMADol 25 milliGRAM(s) every 6 hours PRN              PHYSICAL EXAM  Vital Signs Last 24 Hrs  T(C): 37.1 (16 Aug 2023 05:05), Max: 37.1 (16 Aug 2023 05:05)  T(F): 98.7 (16 Aug 2023 05:05), Max: 98.7 (16 Aug 2023 05:05)  HR: 72 (16 Aug 2023 05:05) (72 - 84)  BP: 112/80 (16 Aug 2023 05:05) (112/80 - 134/83)  BP(mean): --  RR: 17 (16 Aug 2023 05:05) (17 - 18)  SpO2: 100% (16 Aug 2023 05:05) (100% - 100%)    Parameters below as of 16 Aug 2023 05:05  Patient On (Oxygen Delivery Method): room air      Constitutional - NAD, Comfortable, in bed   Chest - breathing comfortably   Cardiovascular - RRR, S1S2  Abdomen - BS+, Soft, NTND  Extremities - No C/C/E, No calf tenderness   Neurologic Exam -      follows commands               Cognitive - Awake, Alert, Oriented to self, place, date, year, situation     Communication - Fluent, No dysarthria       Motor -   BUE 5/5mp  BLE 5/5mp       Sensory - decreased b/l LE, prop absent 1 MTP bilaterally, impaired at right ankle, intact at left ankle  msr: 0 KJ, 0 AJ (B), negative Babinski    Psychiatric - Affect WNL    Functional status: 8/14/23    Sit-Stand Transfer Training  Sit-to-Stand Transfer Training Rehab Potential: good, to achieve stated therapy goals  Sit-to-Stand Transfer Training Symptoms Noted During/After Treatment: none  Transfer Training Sit-to-Stand Transfer: moderate assist (50% patient effort);  1 person assist;  verbal cues;  full weight-bearing   rolling walker  Transfer Training Stand-to-Sit Transfer: moderate assist (50% patient effort);  1 person assist;  verbal cues;  full weight-bearing   rolling walker  Sit-to-Stand Transfer Training Transfer Safety Analysis: decreased balance;  decreased weight-shifting ability;  impaired balance;  decreased strength;  impaired postural control;  rolling walker    Gait Training  Gait Training Rehab Potential: good, to achieve stated therapy goals  Gait Training Symptoms Noted During/After Treatment: none  Gait Training: moderate assist (50% patient effort);  1 person assist;  verbal cues;  full weight-bearing   rolling walker;  15 feet  Gait Analysis: 3-point gait   decreased dre;  increased time in double stance;  decreased step length;  decreased stride length;  impaired balance;  decreased strength;  impaired motor control;  impaired postural control;  15 feet;  rolling walker    LABS:      RECENT LABS -                        11.3   4.43  )-----------( 297      ( 07 Aug 2023 05:07 )             35.5     08-07    138  |  101  |  12  ----------------------------<  81  3.8   |  28  |  0.56    Ca    9.3      07 Aug 2023 05:07  Phos  4.1     08-07  Mg     1.60     08-07    TPro  7.0  /  Alb  3.4  /  TBili  0.3  /  DBili  x   /  AST  36<H>  /  ALT  26  /  AlkPhos  64  08-07      Urinalysis Basic - ( 07 Aug 2023 05:07 )    Color: x / Appearance: x / SG: x / pH: x  Gluc: 81 mg/dL / Ketone: x  / Bili: x / Urobili: x   Blood: x / Protein: x / Nitrite: x   Leuk Esterase: x / RBC: x / WBC x   Sq Epi: x / Non Sq Epi: x / Bacteria: x            < from: MR Lumbar Spine w/wo IV Cont (07.27.23 @ 08:05) >    IMPRESSION:    1. BRAIN:  No abnormal brain enhancement. Cerebral hemispheric frontal   subcortical white matter lesions are nonspecific. The pattern suggests   migraine disease. Subcortical lesions of ischemic white matter disease   could have a similar appearance. The differential diagnosis for these   lesions remains broad. This differential diagnosis would also include   other inflammatory, infectious, demyelinating, metabolic and conceivably   other etiologies. Please note, however, that the extent of this disease   is mild.    2. CERVICAL SPINE:   No abnormal cervical enhancement.  Reversal of the   cervical lordosis and moderate mid cervical degenerative disc disease are   findings unchanged from 7/20/2023, see prior report    3. THORACIC SPINE:   No abnormal thoracic enhancement. Thoracic cord   appears intact. T2-T3 shallow right foraminal disc protrusion  (disc   herniation) is associated with slight grade 1 anterior listhesis T2 on T3    4. LUMBAR SPINE:   No abnormallumbar enhancement.   Intact conus and   cauda equina. No significant lumbar disc pathology. Transitional   morphology thoracic lumbar junction    5. There is a generalized pattern of patchy signal heterogeneity on the   T1-weighted images that is nonspecific. This may reflect chronic anemia   or other systemic process although is without focal strongly suspicious   lesion    < end of copied text >      ASSESSMENT/PLAN  Ms Perez is a 42yo woman with a hx of peripheral neuropathy (diagnosed at OSH in 3/2023), sickle cell trait, asthma, and anemia with 8 months of progressively worsening painful paresthesias and weakness and with functional, gait, ADL impairments.   patient reports falls at home, started using rollator outside home in April   MRI imaging with no findings   s/p EMG, not consistent with CIDP  s/p LP, fatpad biopsy      Disposition - patient is a candidate for restorative inpatient rehab, acute unit. Patient can tolerate 3 hours/day of rehab services.     -continue bedside PT/OT   -DVT Prophylaxis - Lovenox  -Diet- regular

## 2023-08-16 NOTE — PROGRESS NOTE ADULT - ATTENDING COMMENTS
43F with PMH of sickle cell trait, asthma, peripheral neuropathy who presents s/p fall with ongoing weakness/paresthesia. Patient has undergone extensive workup including: MRI H/spine, LP w/ CSF studies does not indicate any neuro-related culprits. Hospital course notable for questionable seizure-like activities but EEG negative.  Further workup including echo, fat pad biopsy, paraneoplastic panel, CT A/P all wnl/ no explanation for sx. C/f conversion disorder given negative workup. S/p multiple bedside meetings with Neurology and primary team. Patient reporting sx have been worse since LP and patient reports new symptoms though on chart review, patient documented to have these spasms/erratic movements prior to LP ( see chart note 7/25). This was also noted by medical team. Activity was shared w/ movement disorder specialist and multiple neuro specialists as per neurology team - no causes can be identified based on videos of movement activities and current diagnostics, appears to be functional. As such - medical/neurological workup is complete.       # Spasms, paresthesias   - OP Psych follow up. Patient can see movement specialist as OP if desired. Pt w/ [facility] acceptances- asked SW to send for auth. PMR to follow up.   - Continue duloxetine 60 daily, lyrica and methocarbamol for symptomatic management - unclear benefit at this time. If signs she is not tolerating or developing adverse effect - would consider stopping.     # Liver lesions   - LFT's improving. Likely related to hepatic steatosis. Also noted to have indeterminate hypoechoic liver lesions. MRI recommended however patient required anesthesia for MRI brain. Recommend OP follow up with standing/open MRI or CT liver.     D/C planning- auth pending to [facility], PMR attending saw pt yest, recommended [facility]. No possible change to AR? Will need to clarify and send out appropriate auth

## 2023-08-17 LAB
APTT BLD: 32.9 SEC — SIGNIFICANT CHANGE UP (ref 24.5–35.6)
BLD GP AB SCN SERPL QL: NEGATIVE — SIGNIFICANT CHANGE UP
HCT VFR BLD CALC: 33.8 % — LOW (ref 34.5–45)
HGB BLD-MCNC: 10.9 G/DL — LOW (ref 11.5–15.5)
INR BLD: 1.03 RATIO — SIGNIFICANT CHANGE UP (ref 0.85–1.18)
MCHC RBC-ENTMCNC: 28.8 PG — SIGNIFICANT CHANGE UP (ref 27–34)
MCHC RBC-ENTMCNC: 32.2 GM/DL — SIGNIFICANT CHANGE UP (ref 32–36)
MCV RBC AUTO: 89.4 FL — SIGNIFICANT CHANGE UP (ref 80–100)
NRBC # BLD: 0 /100 WBCS — SIGNIFICANT CHANGE UP (ref 0–0)
NRBC # FLD: 0 K/UL — SIGNIFICANT CHANGE UP (ref 0–0)
PLATELET # BLD AUTO: 215 K/UL — SIGNIFICANT CHANGE UP (ref 150–400)
PROTHROM AB SERPL-ACNC: 11.5 SEC — SIGNIFICANT CHANGE UP (ref 9.5–13)
RBC # BLD: 3.78 M/UL — LOW (ref 3.8–5.2)
RBC # FLD: 13.3 % — SIGNIFICANT CHANGE UP (ref 10.3–14.5)
RH IG SCN BLD-IMP: POSITIVE — SIGNIFICANT CHANGE UP
WBC # BLD: 4.6 K/UL — SIGNIFICANT CHANGE UP (ref 3.8–10.5)
WBC # FLD AUTO: 4.6 K/UL — SIGNIFICANT CHANGE UP (ref 3.8–10.5)

## 2023-08-17 PROCEDURE — 99231 SBSQ HOSP IP/OBS SF/LOW 25: CPT | Mod: GC

## 2023-08-17 RX ADMIN — ENOXAPARIN SODIUM 40 MILLIGRAM(S): 100 INJECTION SUBCUTANEOUS at 11:51

## 2023-08-17 RX ADMIN — Medication 200 MILLIGRAM(S): at 18:06

## 2023-08-17 RX ADMIN — DULOXETINE HYDROCHLORIDE 60 MILLIGRAM(S): 30 CAPSULE, DELAYED RELEASE ORAL at 21:25

## 2023-08-17 RX ADMIN — Medication 1 APPLICATION(S): at 07:11

## 2023-08-17 RX ADMIN — Medication 1 DROP(S): at 07:12

## 2023-08-17 RX ADMIN — METHOCARBAMOL 750 MILLIGRAM(S): 500 TABLET, FILM COATED ORAL at 07:11

## 2023-08-17 RX ADMIN — METHOCARBAMOL 750 MILLIGRAM(S): 500 TABLET, FILM COATED ORAL at 21:26

## 2023-08-17 RX ADMIN — METHOCARBAMOL 750 MILLIGRAM(S): 500 TABLET, FILM COATED ORAL at 13:32

## 2023-08-17 RX ADMIN — Medication 200 MILLIGRAM(S): at 07:10

## 2023-08-17 RX ADMIN — ENOXAPARIN SODIUM 40 MILLIGRAM(S): 100 INJECTION SUBCUTANEOUS at 21:26

## 2023-08-17 RX ADMIN — Medication 1 APPLICATION(S): at 18:06

## 2023-08-17 NOTE — PROGRESS NOTE ADULT - SUBJECTIVE AND OBJECTIVE BOX
PROGRESS NOTE:   Authored by Dr. Bharat Venegas MD (PGY-1).     Patient is a 43y old  Female who presents with a chief complaint of Weakness in lower and upper extremities (16 Aug 2023 09:44)      SUBJECTIVE / OVERNIGHT EVENTS:  No acute events overnight. She has no acute complaints this morning.     MEDICATIONS  (STANDING):  amLODIPine   Tablet 5 milliGRAM(s) Oral daily  artificial  tears Solution 1 Drop(s) Both EYES four times a day  DULoxetine 60 milliGRAM(s) Oral at bedtime  enoxaparin Injectable 40 milliGRAM(s) SubCutaneous every 12 hours  lidocaine   4% Patch 1 Patch Transdermal daily  methocarbamol 750 milliGRAM(s) Oral three times a day  nystatin/triamcinolone Ointment 1 Application(s) Topical two times a day  polyethylene glycol 3350 17 Gram(s) Oral daily  pregabalin 200 milliGRAM(s) Oral two times a day    MEDICATIONS  (PRN):  diphenhydrAMINE 50 milliGRAM(s) Oral once PRN Anxiety  diphenhydrAMINE Injectable 25 milliGRAM(s) IV Push every 6 hours PRN Rash and/or Itching  LORazepam   Injectable 2 milliGRAM(s) IV Push once PRN Anxiety      CAPILLARY BLOOD GLUCOSE        I&O's Summary      PHYSICAL EXAM:  Vital Signs Last 24 Hrs  T(C): 36.7 (17 Aug 2023 05:38), Max: 37 (16 Aug 2023 21:58)  T(F): 98.1 (17 Aug 2023 05:38), Max: 98.6 (16 Aug 2023 21:58)  HR: 81 (17 Aug 2023 05:38) (73 - 81)  BP: 104/68 (17 Aug 2023 05:38) (104/68 - 129/84)  BP(mean): --  RR: 17 (17 Aug 2023 05:38) (16 - 18)  SpO2: 100% (17 Aug 2023 05:38) (100% - 100%)    Parameters below as of 17 Aug 2023 05:38  Patient On (Oxygen Delivery Method): room air        CONSTITUTIONAL: NAD, well-developed  HEET: MMM, EOMI, PERRLA  NECK: supple  RESPIRATORY: Normal respiratory effort; lungs are clear to auscultation bilaterally  CARDIOVASCULAR: Regular rate and rhythm, normal S1 and S2, no murmur/rub/gallop; No lower extremity edema; Peripheral pulses are 2+ bilaterally  ABDOMEN: Nontender to palpation, normoactive bowel sounds, no rebound/guarding; No hepatosplenomegaly  MUSCULOSKELETAL: 5/5 strength in all extremities on flexion and extension   NEURO: Diminished sensation in lower extremities  PSYCH: A+O to person, place, and time; affect appropriate  SKIN: No rash    LABS:                      Tele Reviewed:    RADIOLOGY & ADDITIONAL TESTS:  Results Reviewed:   Imaging Personally Reviewed:  Electrocardiogram Personally Reviewed:

## 2023-08-17 NOTE — CHART NOTE - NSCHARTNOTEFT_GEN_A_CORE
-I was messaged by RN because patient was experiencing heavy menses with clots and patient states she has not had her menses in the past year.   - I assessed the patient and she was experiencing fatigue, but no shortness of breath nor lightheadedness. Blood was also noted to be collecting in the urine collection canister.   -I took her vitals which were within normal limits (122/76, HR 90, So2 100%) and a stat CBC was ordered along with a type and screen and coags.

## 2023-08-17 NOTE — PROGRESS NOTE ADULT - ASSESSMENT
Ms Perez is a 42yo woman with a hx of peripheral neuropathy, sickle cell trait, asthma, and anemia who was admitted after suffering a fall with inability to get up due to 4 months of ongoing bilateral lower and upper extremity weakness and paresthesias. Homocysteine slightly elevated however rest of lab workup unremarkable so far. EMG not consistent with CIDP. MRI head, C/T spine completed 7/21 showing C4-C6 disc protrusions causing ventral cord deformity, not consistent with clinical presentation. Currently pending AR placement.

## 2023-08-17 NOTE — PROGRESS NOTE ADULT - ATTENDING COMMENTS
43F with PMH of sickle cell trait, asthma, peripheral neuropathy who presents s/p fall with ongoing weakness/paresthesia. Patient has undergone extensive workup including: MRI H/spine, LP w/ CSF studies does not indicate any neuro-related culprits. Hospital course notable for questionable seizure-like activities but EEG negative.  Further workup including echo, fat pad biopsy, paraneoplastic panel, CT A/P all wnl/ no explanation for sx. C/f conversion disorder given negative workup. S/p multiple bedside meetings with Neurology and primary team. Patient reporting sx have been worse since LP and patient reports new symptoms though on chart review, patient documented to have these spasms/erratic movements prior to LP ( see chart note 7/25). This was also noted by medical team. Activity was shared w/ movement disorder specialist and multiple neuro specialists as per neurology team - no causes can be identified based on videos of movement activities and current diagnostics, appears to be functional. As such - medical/neurological workup is complete.     # Spasms, paresthesias   - OP Psych follow up. Patient can see movement specialist as OP if desired.   - Continue duloxetine 60 daily, lyrica and methocarbamol for symptomatic management - unclear benefit at this time. If signs she is not tolerating or developing adverse effect - would consider stopping.   - No spasms seen on evaluation x 1.5 weeks  - Patient seen by PMR- recommended for AR, then lost acceptance, re-evaluated by PMR- recommended for [facility], auth pending but then PMR changed recommendations to [facility]. [facility] auth now pending.     # Liver lesions   - LFT's improving. Likely related to hepatic steatosis. Also noted to have indeterminate hypoechoic liver lesions. MRI recommended however patient required anesthesia for MRI brain. Recommend OP follow up with standing/open MRI or CT liver.     Pending auth to acute rehab.

## 2023-08-18 DIAGNOSIS — N92.0 EXCESSIVE AND FREQUENT MENSTRUATION WITH REGULAR CYCLE: ICD-10-CM

## 2023-08-18 LAB
ANION GAP SERPL CALC-SCNC: 9 MMOL/L — SIGNIFICANT CHANGE UP (ref 7–14)
BUN SERPL-MCNC: 11 MG/DL — SIGNIFICANT CHANGE UP (ref 7–23)
CALCIUM SERPL-MCNC: 8.9 MG/DL — SIGNIFICANT CHANGE UP (ref 8.4–10.5)
CHLORIDE SERPL-SCNC: 104 MMOL/L — SIGNIFICANT CHANGE UP (ref 98–107)
CO2 SERPL-SCNC: 28 MMOL/L — SIGNIFICANT CHANGE UP (ref 22–31)
CREAT SERPL-MCNC: 0.53 MG/DL — SIGNIFICANT CHANGE UP (ref 0.5–1.3)
EGFR: 118 ML/MIN/1.73M2 — SIGNIFICANT CHANGE UP
GLUCOSE SERPL-MCNC: 104 MG/DL — HIGH (ref 70–99)
HCT VFR BLD CALC: 28.9 % — LOW (ref 34.5–45)
HCT VFR BLD CALC: 29.4 % — LOW (ref 34.5–45)
HGB BLD-MCNC: 9.4 G/DL — LOW (ref 11.5–15.5)
HGB BLD-MCNC: 9.5 G/DL — LOW (ref 11.5–15.5)
MAGNESIUM SERPL-MCNC: 1.7 MG/DL — SIGNIFICANT CHANGE UP (ref 1.6–2.6)
MCHC RBC-ENTMCNC: 28.5 PG — SIGNIFICANT CHANGE UP (ref 27–34)
MCHC RBC-ENTMCNC: 29.1 PG — SIGNIFICANT CHANGE UP (ref 27–34)
MCHC RBC-ENTMCNC: 32 GM/DL — SIGNIFICANT CHANGE UP (ref 32–36)
MCHC RBC-ENTMCNC: 32.9 GM/DL — SIGNIFICANT CHANGE UP (ref 32–36)
MCV RBC AUTO: 88.7 FL — SIGNIFICANT CHANGE UP (ref 80–100)
MCV RBC AUTO: 89.1 FL — SIGNIFICANT CHANGE UP (ref 80–100)
NRBC # BLD: 0 /100 WBCS — SIGNIFICANT CHANGE UP (ref 0–0)
NRBC # BLD: 0 /100 WBCS — SIGNIFICANT CHANGE UP (ref 0–0)
NRBC # FLD: 0 K/UL — SIGNIFICANT CHANGE UP (ref 0–0)
NRBC # FLD: 0 K/UL — SIGNIFICANT CHANGE UP (ref 0–0)
PHOSPHATE SERPL-MCNC: 4.4 MG/DL — SIGNIFICANT CHANGE UP (ref 2.5–4.5)
PLATELET # BLD AUTO: 192 K/UL — SIGNIFICANT CHANGE UP (ref 150–400)
PLATELET # BLD AUTO: 197 K/UL — SIGNIFICANT CHANGE UP (ref 150–400)
POTASSIUM SERPL-MCNC: 3.8 MMOL/L — SIGNIFICANT CHANGE UP (ref 3.5–5.3)
POTASSIUM SERPL-SCNC: 3.8 MMOL/L — SIGNIFICANT CHANGE UP (ref 3.5–5.3)
RBC # BLD: 3.26 M/UL — LOW (ref 3.8–5.2)
RBC # BLD: 3.3 M/UL — LOW (ref 3.8–5.2)
RBC # FLD: 13.2 % — SIGNIFICANT CHANGE UP (ref 10.3–14.5)
RBC # FLD: 13.3 % — SIGNIFICANT CHANGE UP (ref 10.3–14.5)
SODIUM SERPL-SCNC: 141 MMOL/L — SIGNIFICANT CHANGE UP (ref 135–145)
WBC # BLD: 4.73 K/UL — SIGNIFICANT CHANGE UP (ref 3.8–10.5)
WBC # BLD: 4.73 K/UL — SIGNIFICANT CHANGE UP (ref 3.8–10.5)
WBC # FLD AUTO: 4.73 K/UL — SIGNIFICANT CHANGE UP (ref 3.8–10.5)
WBC # FLD AUTO: 4.73 K/UL — SIGNIFICANT CHANGE UP (ref 3.8–10.5)

## 2023-08-18 PROCEDURE — 99232 SBSQ HOSP IP/OBS MODERATE 35: CPT | Mod: GC

## 2023-08-18 RX ORDER — MAGNESIUM SULFATE 500 MG/ML
2 VIAL (ML) INJECTION ONCE
Refills: 0 | Status: COMPLETED | OUTPATIENT
Start: 2023-08-18 | End: 2023-08-18

## 2023-08-18 RX ORDER — POTASSIUM CHLORIDE 20 MEQ
40 PACKET (EA) ORAL ONCE
Refills: 0 | Status: COMPLETED | OUTPATIENT
Start: 2023-08-18 | End: 2023-08-18

## 2023-08-18 RX ADMIN — METHOCARBAMOL 750 MILLIGRAM(S): 500 TABLET, FILM COATED ORAL at 05:50

## 2023-08-18 RX ADMIN — DULOXETINE HYDROCHLORIDE 60 MILLIGRAM(S): 30 CAPSULE, DELAYED RELEASE ORAL at 21:31

## 2023-08-18 RX ADMIN — METHOCARBAMOL 750 MILLIGRAM(S): 500 TABLET, FILM COATED ORAL at 21:31

## 2023-08-18 RX ADMIN — METHOCARBAMOL 750 MILLIGRAM(S): 500 TABLET, FILM COATED ORAL at 12:19

## 2023-08-18 RX ADMIN — Medication 1 APPLICATION(S): at 19:03

## 2023-08-18 RX ADMIN — ENOXAPARIN SODIUM 40 MILLIGRAM(S): 100 INJECTION SUBCUTANEOUS at 21:31

## 2023-08-18 RX ADMIN — ENOXAPARIN SODIUM 40 MILLIGRAM(S): 100 INJECTION SUBCUTANEOUS at 12:19

## 2023-08-18 RX ADMIN — Medication 25 GRAM(S): at 09:39

## 2023-08-18 RX ADMIN — Medication 200 MILLIGRAM(S): at 19:03

## 2023-08-18 RX ADMIN — Medication 1 APPLICATION(S): at 05:50

## 2023-08-18 RX ADMIN — Medication 40 MILLIEQUIVALENT(S): at 09:40

## 2023-08-18 NOTE — PROGRESS NOTE ADULT - PROBLEM SELECTOR PLAN 2
- pt experiencing heavy menses since yesterday  - she has not had her menses in over a year because she is perimenopausal  - She has been HD Stable and CBC has been stable as well  - recommend to follow up outpatient - pt experiencing heavy menses since yesterday  - she has not had her menses in over a year because she is perimenopausal  - She has been HD Stable and CBC has been stable as well  -f/u CBC at in afternoon

## 2023-08-18 NOTE — PROGRESS NOTE ADULT - SUBJECTIVE AND OBJECTIVE BOX
PROGRESS NOTE:   Authored by Dr. Bharat Venegas MD (PGY-1).     Patient is a 43y old  Female who presents with a chief complaint of Weakness in lower and upper extremities (17 Aug 2023 08:31)      SUBJECTIVE / OVERNIGHT EVENTS:  Since yesterday evening the patient has been experiencing heavy menses that have continued into the morning. She is not experiencing lightheadedness nor SOB.    MEDICATIONS  (STANDING):  amLODIPine   Tablet 5 milliGRAM(s) Oral daily  artificial  tears Solution 1 Drop(s) Both EYES four times a day  DULoxetine 60 milliGRAM(s) Oral at bedtime  enoxaparin Injectable 40 milliGRAM(s) SubCutaneous every 12 hours  lidocaine   4% Patch 1 Patch Transdermal daily  magnesium sulfate  IVPB 2 Gram(s) IV Intermittent once  methocarbamol 750 milliGRAM(s) Oral three times a day  nystatin/triamcinolone Ointment 1 Application(s) Topical two times a day  polyethylene glycol 3350 17 Gram(s) Oral daily  potassium chloride   Powder 40 milliEquivalent(s) Oral once    MEDICATIONS  (PRN):  diphenhydrAMINE 50 milliGRAM(s) Oral once PRN Anxiety  diphenhydrAMINE Injectable 25 milliGRAM(s) IV Push every 6 hours PRN Rash and/or Itching  LORazepam   Injectable 2 milliGRAM(s) IV Push once PRN Anxiety      CAPILLARY BLOOD GLUCOSE        I&O's Summary    17 Aug 2023 07:01  -  18 Aug 2023 07:00  --------------------------------------------------------  IN: 0 mL / OUT: 1300 mL / NET: -1300 mL        PHYSICAL EXAM:  Vital Signs Last 24 Hrs  T(C): 36.8 (18 Aug 2023 05:39), Max: 37.1 (17 Aug 2023 21:46)  T(F): 98.2 (18 Aug 2023 05:39), Max: 98.8 (17 Aug 2023 21:46)  HR: 80 (18 Aug 2023 05:39) (80 - 84)  BP: 108/65 (18 Aug 2023 05:39) (108/65 - 117/69)  BP(mean): --  RR: 17 (18 Aug 2023 05:39) (17 - 18)  SpO2: 100% (18 Aug 2023 05:39) (100% - 100%)    Parameters below as of 18 Aug 2023 05:39  Patient On (Oxygen Delivery Method): room air        CONSTITUTIONAL: NAD, well-developed  HEET: MMM, EOMI, PERRLA  NECK: supple  RESPIRATORY: Normal respiratory effort; lungs are clear to auscultation bilaterally  CARDIOVASCULAR: Regular rate and rhythm, normal S1 and S2, no murmur/rub/gallop; No lower extremity edema; Peripheral pulses are 2+ bilaterally  ABDOMEN: Nontender to palpation, normoactive bowel sounds, no rebound/guarding; No hepatosplenomegaly  MUSCULOSKELETAL: 5/5 strength on flexion and extension in all extremities  NEURO: Diminished sensation in lower extremities   : Blood clots noted near purewick  PSYCH: A+O to person, place, and time; affect appropriate  SKIN: No rash    LABS:                        9.5    4.73  )-----------( 192      ( 18 Aug 2023 07:05 )             28.9     08-18    141  |  104  |  11  ----------------------------<  104<H>  3.8   |  28  |  0.53    Ca    8.9      18 Aug 2023 07:05  Phos  4.4     08-18  Mg     1.70     08-18      PT/INR - ( 17 Aug 2023 19:00 )   PT: 11.5 sec;   INR: 1.03 ratio         PTT - ( 17 Aug 2023 19:00 )  PTT:32.9 sec      Urinalysis Basic - ( 18 Aug 2023 07:05 )    Color: x / Appearance: x / SG: x / pH: x  Gluc: 104 mg/dL / Ketone: x  / Bili: x / Urobili: x   Blood: x / Protein: x / Nitrite: x   Leuk Esterase: x / RBC: x / WBC x   Sq Epi: x / Non Sq Epi: x / Bacteria: x          Tele Reviewed:    RADIOLOGY & ADDITIONAL TESTS:  Results Reviewed:   Imaging Personally Reviewed:  Electrocardiogram Personally Reviewed:

## 2023-08-18 NOTE — PROGRESS NOTE ADULT - ASSESSMENT
Ms Perez is a 44yo woman with a hx of peripheral neuropathy, sickle cell trait, asthma, and anemia who was admitted after suffering a fall with inability to get up due to 4 months of ongoing bilateral lower and upper extremity weakness and paresthesias. Homocysteine slightly elevated however rest of lab workup unremarkable so far. EMG not consistent with CIDP. MRI head, C/T spine completed 7/21 showing C4-C6 disc protrusions causing ventral cord deformity, not consistent with clinical presentation. Currently pending AR placement.

## 2023-08-18 NOTE — PROGRESS NOTE ADULT - ATTENDING COMMENTS
43F with PMH of sickle cell trait, asthma, peripheral neuropathy who presents s/p fall with ongoing weakness/paresthesia. Patient has undergone extensive workup including: MRI H/spine, LP w/ CSF studies does not indicate any neuro-related culprits. Hospital course notable for questionable seizure-like activities but EEG negative.  Further workup including echo, fat pad biopsy, paraneoplastic panel, CT A/P all wnl/ no explanation for sx. C/f conversion disorder given negative workup. S/p multiple bedside meetings with Neurology and primary team. Patient reporting sx have been worse since LP and patient reports new symptoms though on chart review, patient documented to have these spasms/erratic movements prior to LP ( see chart note 7/25). This was also noted by medical team. Activity was shared w/ movement disorder specialist and multiple neuro specialists as per neurology team - no causes can be identified based on videos of movement activities and current diagnostics, appears to be functional. As such - medical/neurological workup is complete.     Patient with heavy vaginal bleeding since yest. H/H dropping. Reports last menses was August/Sept of last year.     # Spasms, paresthesias   - OP Psych follow up. Patient can see movement specialist as OP if desired.   - Continue duloxetine 60 daily, lyrica and methocarbamol for symptomatic management - unclear benefit at this time. If signs she is not tolerating or developing adverse effect - would consider stopping.   - No spasms seen on evaluation x 2 weeks  - Patient seen by PMR- recommended for AR, then lost auth, re-evaluated by PMR- recommended for [facility], auth pending but then PMR changed recommendations to AR. AR auth now pending.     # Liver lesions   - LFT's improving. Likely related to hepatic steatosis. Also noted to have indeterminate hypoechoic liver lesions. MRI recommended however patient required anesthesia for MRI brain. Recommend OP follow up with standing/open MRI or CT liver.     # Vaginal bleeding   - [place]-menopausal. Suspect menses so heavy since last menses was year ago. Monitor H/H.     Pending auth to acute rehab.

## 2023-08-19 LAB
ANION GAP SERPL CALC-SCNC: 7 MMOL/L — SIGNIFICANT CHANGE UP (ref 7–14)
BUN SERPL-MCNC: 12 MG/DL — SIGNIFICANT CHANGE UP (ref 7–23)
CALCIUM SERPL-MCNC: 8.9 MG/DL — SIGNIFICANT CHANGE UP (ref 8.4–10.5)
CHLORIDE SERPL-SCNC: 104 MMOL/L — SIGNIFICANT CHANGE UP (ref 98–107)
CO2 SERPL-SCNC: 28 MMOL/L — SIGNIFICANT CHANGE UP (ref 22–31)
CREAT SERPL-MCNC: 0.54 MG/DL — SIGNIFICANT CHANGE UP (ref 0.5–1.3)
EGFR: 117 ML/MIN/1.73M2 — SIGNIFICANT CHANGE UP
GLUCOSE SERPL-MCNC: 95 MG/DL — SIGNIFICANT CHANGE UP (ref 70–99)
HCT VFR BLD CALC: 27.2 % — LOW (ref 34.5–45)
HGB BLD-MCNC: 8.8 G/DL — LOW (ref 11.5–15.5)
MAGNESIUM SERPL-MCNC: 1.9 MG/DL — SIGNIFICANT CHANGE UP (ref 1.6–2.6)
MCHC RBC-ENTMCNC: 28.9 PG — SIGNIFICANT CHANGE UP (ref 27–34)
MCHC RBC-ENTMCNC: 32.4 GM/DL — SIGNIFICANT CHANGE UP (ref 32–36)
MCV RBC AUTO: 89.2 FL — SIGNIFICANT CHANGE UP (ref 80–100)
NRBC # BLD: 0 /100 WBCS — SIGNIFICANT CHANGE UP (ref 0–0)
NRBC # FLD: 0 K/UL — SIGNIFICANT CHANGE UP (ref 0–0)
PHOSPHATE SERPL-MCNC: 4.4 MG/DL — SIGNIFICANT CHANGE UP (ref 2.5–4.5)
PLATELET # BLD AUTO: 190 K/UL — SIGNIFICANT CHANGE UP (ref 150–400)
POTASSIUM SERPL-MCNC: 4 MMOL/L — SIGNIFICANT CHANGE UP (ref 3.5–5.3)
POTASSIUM SERPL-SCNC: 4 MMOL/L — SIGNIFICANT CHANGE UP (ref 3.5–5.3)
RBC # BLD: 3.05 M/UL — LOW (ref 3.8–5.2)
RBC # FLD: 13.3 % — SIGNIFICANT CHANGE UP (ref 10.3–14.5)
SODIUM SERPL-SCNC: 139 MMOL/L — SIGNIFICANT CHANGE UP (ref 135–145)
WBC # BLD: 4.94 K/UL — SIGNIFICANT CHANGE UP (ref 3.8–10.5)
WBC # FLD AUTO: 4.94 K/UL — SIGNIFICANT CHANGE UP (ref 3.8–10.5)

## 2023-08-19 PROCEDURE — 99232 SBSQ HOSP IP/OBS MODERATE 35: CPT | Mod: GC

## 2023-08-19 RX ORDER — MAGNESIUM OXIDE 400 MG ORAL TABLET 241.3 MG
400 TABLET ORAL ONCE
Refills: 0 | Status: COMPLETED | OUTPATIENT
Start: 2023-08-19 | End: 2023-08-19

## 2023-08-19 RX ORDER — FERROUS SULFATE 325(65) MG
325 TABLET ORAL
Refills: 0 | Status: COMPLETED | OUTPATIENT
Start: 2023-08-19 | End: 2023-08-24

## 2023-08-19 RX ORDER — ASCORBIC ACID 60 MG
500 TABLET,CHEWABLE ORAL DAILY
Refills: 0 | Status: DISCONTINUED | OUTPATIENT
Start: 2023-08-19 | End: 2023-08-25

## 2023-08-19 RX ADMIN — ENOXAPARIN SODIUM 40 MILLIGRAM(S): 100 INJECTION SUBCUTANEOUS at 12:45

## 2023-08-19 RX ADMIN — DULOXETINE HYDROCHLORIDE 60 MILLIGRAM(S): 30 CAPSULE, DELAYED RELEASE ORAL at 22:05

## 2023-08-19 RX ADMIN — METHOCARBAMOL 750 MILLIGRAM(S): 500 TABLET, FILM COATED ORAL at 22:05

## 2023-08-19 RX ADMIN — METHOCARBAMOL 750 MILLIGRAM(S): 500 TABLET, FILM COATED ORAL at 06:32

## 2023-08-19 RX ADMIN — Medication 200 MILLIGRAM(S): at 06:32

## 2023-08-19 RX ADMIN — METHOCARBAMOL 750 MILLIGRAM(S): 500 TABLET, FILM COATED ORAL at 13:13

## 2023-08-19 RX ADMIN — Medication 500 MILLIGRAM(S): at 12:43

## 2023-08-19 RX ADMIN — Medication 325 MILLIGRAM(S): at 12:43

## 2023-08-19 RX ADMIN — Medication 1 DROP(S): at 18:43

## 2023-08-19 RX ADMIN — Medication 200 MILLIGRAM(S): at 18:46

## 2023-08-19 RX ADMIN — MAGNESIUM OXIDE 400 MG ORAL TABLET 400 MILLIGRAM(S): 241.3 TABLET ORAL at 12:43

## 2023-08-19 RX ADMIN — ENOXAPARIN SODIUM 40 MILLIGRAM(S): 100 INJECTION SUBCUTANEOUS at 22:05

## 2023-08-19 RX ADMIN — Medication 1 APPLICATION(S): at 18:44

## 2023-08-19 RX ADMIN — Medication 1 DROP(S): at 12:42

## 2023-08-19 NOTE — PROGRESS NOTE ADULT - ATTENDING COMMENTS
Patient seen and examined, care d/w HS.    43F sickle cell trait, asthma, peripheral neuropathy who presents s/p fall with ongoing weakness/paresthesia. Patient has undergone extensive workup including: MRI H/spine, LP w/ CSF studies does not indicate any neuro-related culprits. Hospital course notable for questionable seizure-like activities but EEG negative.  Further workup including echo, fat pad biopsy, paraneoplastic panel, CT A/P all wnl/ no explanation for sx. C/f conversion disorder given negative workup. S/p multiple bedside meetings with Neurology and primary team. Patient reporting sx have been worse since LP and patient reports new symptoms though on chart review, patient documented to have these spasms/erratic movements prior to LP ( see chart note 7/25). This was also noted by medical team. Activity was shared w/ movement disorder specialist and multiple neuro specialists as per neurology team - no causes can be identified based on videos of movement activities and current diagnostics, appears to be functional. As such - medical/neurological workup is complete.  Now heavy vaginal bleedig. H/H dropping. Reports last menses was August/Sept of last year.     No complaints today.     # Spasms, paresthesias   - OP Psych follow up  - OP movement disorder specialist as OP if desired   - c/w duloxetine 60 daily, Lyrica 200 mg BID and methocarbamol 750 mg TID for symptomatic management - unclear benefit at this time. If signs she is not tolerating or developing adverse effect - would consider stopping.   - Patient seen by PMR- recommended for AR, then lost auth, re-evaluated by PMR- recommended for BOOM, auth pending but then PMR changed recommendations to AR. AR auth now pending.     # Liver lesions:  LFT's improving. Likely related to hepatic steatosis. Also noted to have indeterminate hypoechoic liver lesions. MRI recommended however patient required anesthesia for MRI brain.   - OP follow up with standing/open MRI or CT liver.     # Vaginal bleeding/Menses: Hb 11-->8  - monitoring, can start PO iron and vitamin C    # HTN: on amlodipine, last dose 8/11  - dc and resume as needed for HTN, SBP >130/90    Obese BMI 40  Lovenox 40 mg BID    dc to AR pending auth

## 2023-08-19 NOTE — PROGRESS NOTE ADULT - ASSESSMENT
Ms Perez is a 44yo woman with a hx of peripheral neuropathy, sickle cell trait, asthma, and anemia who was admitted after suffering a fall with inability to get up due to 4 months of ongoing bilateral lower and upper extremity weakness and paresthesias. Homocysteine slightly elevated however rest of lab workup unremarkable so far. EMG not consistent with CIDP. MRI head, C/T spine completed 7/21 showing C4-C6 disc protrusions causing ventral cord deformity, not consistent with clinical presentation. LP and MRI spine also done under anesthesia with unremarkable findings. Now also experiencing heavy menses,

## 2023-08-19 NOTE — PROGRESS NOTE ADULT - SUBJECTIVE AND OBJECTIVE BOX
PROGRESS NOTE:   Authored by Dr. Bharat Venegas MD (PGY-1).     Patient is a 43y old  Female who presents with a chief complaint of Weakness in lower and upper extremities (18 Aug 2023 09:27)      SUBJECTIVE / OVERNIGHT EVENTS:  No acute events overnight. She is still experiencing her menses this morning although they have decreased since the day prior.     MEDICATIONS  (STANDING):  amLODIPine   Tablet 5 milliGRAM(s) Oral daily  artificial  tears Solution 1 Drop(s) Both EYES four times a day  DULoxetine 60 milliGRAM(s) Oral at bedtime  enoxaparin Injectable 40 milliGRAM(s) SubCutaneous every 12 hours  lidocaine   4% Patch 1 Patch Transdermal daily  methocarbamol 750 milliGRAM(s) Oral three times a day  nystatin/triamcinolone Ointment 1 Application(s) Topical two times a day  polyethylene glycol 3350 17 Gram(s) Oral daily  pregabalin 200 milliGRAM(s) Oral two times a day    MEDICATIONS  (PRN):  diphenhydrAMINE 50 milliGRAM(s) Oral once PRN Anxiety  diphenhydrAMINE Injectable 25 milliGRAM(s) IV Push every 6 hours PRN Rash and/or Itching  LORazepam   Injectable 2 milliGRAM(s) IV Push once PRN Anxiety      CAPILLARY BLOOD GLUCOSE        I&O's Summary      PHYSICAL EXAM:  Vital Signs Last 24 Hrs  T(C): 36.4 (19 Aug 2023 05:44), Max: 36.9 (18 Aug 2023 22:21)  T(F): 97.6 (19 Aug 2023 05:44), Max: 98.4 (18 Aug 2023 22:21)  HR: 102 (19 Aug 2023 05:44) (84 - 102)  BP: 100/60 (19 Aug 2023 05:44) (100/60 - 116/78)  BP(mean): --  RR: 17 (19 Aug 2023 05:44) (17 - 18)  SpO2: 98% (19 Aug 2023 05:44) (98% - 100%)    Parameters below as of 19 Aug 2023 05:44  Patient On (Oxygen Delivery Method): room air        CONSTITUTIONAL: NAD, well-developed  HEET: MMM, EOMI, PERRLA  NECK: supple  RESPIRATORY: Normal respiratory effort; lungs are clear to auscultation bilaterally  CARDIOVASCULAR: Regular rate and rhythm, normal S1 and S2, no murmur/rub/gallop; No lower extremity edema; Peripheral pulses are 2+ bilaterally  ABDOMEN: Nontender to palpation, normoactive bowel sounds, no rebound/guarding; No hepatosplenomegaly  MUSCULOSKELETAL: 5/5 strength on flexion and extension in all extremities  NEURO: Diminished sensation in lower extremities   : Blood clots noted near [facility]  PSYCH: A+O to person, place, and time; affect appropriate  SKIN: No rash    LABS:                        8.8    4.94  )-----------( 190      ( 19 Aug 2023 06:49 )             27.2     08-18    141  |  104  |  11  ----------------------------<  104<H>  3.8   |  28  |  0.53    Ca    8.9      18 Aug 2023 07:05  Phos  4.4     08-18  Mg     1.70     08-18      PT/INR - ( 17 Aug 2023 19:00 )   PT: 11.5 sec;   INR: 1.03 ratio         PTT - ( 17 Aug 2023 19:00 )  PTT:32.9 sec      Urinalysis Basic - ( 18 Aug 2023 07:05 )    Color: x / Appearance: x / SG: x / pH: x  Gluc: 104 mg/dL / Ketone: x  / Bili: x / Urobili: x   Blood: x / Protein: x / Nitrite: x   Leuk Esterase: x / RBC: x / WBC x   Sq Epi: x / Non Sq Epi: x / Bacteria: x          Tele Reviewed:    RADIOLOGY & ADDITIONAL TESTS:  Results Reviewed:   Imaging Personally Reviewed:  Electrocardiogram Personally Reviewed:     PROGRESS NOTE:   Authored by Dr. Bharat Venegas MD (PGY-1).     Patient is a 43y old  Female who presents with a chief complaint of Weakness in lower and upper extremities (18 Aug 2023 09:27)    SUBJECTIVE / OVERNIGHT EVENTS:  No acute events overnight. She is still experiencing her menses this morning although they have decreased since the day prior.     MEDICATIONS  (STANDING):  amLODIPine   Tablet 5 milliGRAM(s) Oral daily  artificial  tears Solution 1 Drop(s) Both EYES four times a day  DULoxetine 60 milliGRAM(s) Oral at bedtime  enoxaparin Injectable 40 milliGRAM(s) SubCutaneous every 12 hours  lidocaine   4% Patch 1 Patch Transdermal daily  methocarbamol 750 milliGRAM(s) Oral three times a day  nystatin/triamcinolone Ointment 1 Application(s) Topical two times a day  polyethylene glycol 3350 17 Gram(s) Oral daily  pregabalin 200 milliGRAM(s) Oral two times a day    MEDICATIONS  (PRN):  diphenhydrAMINE 50 milliGRAM(s) Oral once PRN Anxiety  diphenhydrAMINE Injectable 25 milliGRAM(s) IV Push every 6 hours PRN Rash and/or Itching  LORazepam   Injectable 2 milliGRAM(s) IV Push once PRN Anxiety      PHYSICAL EXAM:  Vital Signs Last 24 Hrs  T(C): 36.4 (19 Aug 2023 05:44), Max: 36.9 (18 Aug 2023 22:21)  T(F): 97.6 (19 Aug 2023 05:44), Max: 98.4 (18 Aug 2023 22:21)  HR: 102 (19 Aug 2023 05:44) (84 - 102)  BP: 100/60 (19 Aug 2023 05:44) (100/60 - 116/78)  RR: 17 (19 Aug 2023 05:44) (17 - 18)  SpO2: 98% (19 Aug 2023 05:44) (98% - 100%)    CONSTITUTIONAL: NAD, well-developed  HEET: MMM, EOMI, PERRLA  NECK: supple  RESPIRATORY: Normal respiratory effort; lungs are clear to auscultation bilaterally  CARDIOVASCULAR: Regular rate and rhythm, normal S1 and S2, no murmur/rub/gallop; No lower extremity edema; Peripheral pulses are 2+ bilaterally  ABDOMEN: Nontender to palpation, normoactive bowel sounds, no rebound/guarding; No hepatosplenomegaly  MUSCULOSKELETAL: 5/5 strength on flexion and extension in all extremities  NEURO: Diminished sensation in lower extremities   : Blood clots noted near [place]  PSYCH: A+O to person, place, and time; affect appropriate  SKIN: No rash    LABS:                        8.8    4.94  )-----------( 190      ( 19 Aug 2023 06:49 )             27.2     08-18    141  |  104  |  11  ----------------------------<  104<H>  3.8   |  28  |  0.53    Ca    8.9      18 Aug 2023 07:05  Phos  4.4     08-18  Mg     1.70     08-18      PT/INR - ( 17 Aug 2023 19:00 )   PT: 11.5 sec;   INR: 1.03 ratio    PTT - ( 17 Aug 2023 19:00 )  PTT:32.9 sec    Electrocardiogram Personally Reviewed:

## 2023-08-19 NOTE — PROGRESS NOTE ADULT - PROBLEM SELECTOR PLAN 2
Ascending weakness and paresthesias, beginning in 3/2023  EMG: No electrophysiologic evidence of a motor polyradiculoneuropathy - findings are not consistent with a diagnosis of CIDP  MRI head/cervical/thoracic: Mid cervical degenerative disc disease, C4-C5 broad irregular right central and C5-C6 focal right foraminal disc protrusion (disc herniation) that cause ventral cord deformity. Not consistent with clinical presentation per neurosurgery    Plan:  - LP under sedation performed 7/27; CSF studies have been unremarkable so far  - MRI under sedation completed 7/26/23- unremarkable findings   - Also started on robaxin 750mg TID and motrin 600mg and duloxetine 60mg for additional pain control  - Neurology following; appreciate recs  - EEG, TTE normal, CT chest normal   - fatpad biopsy done 8/1; path results did not show amyloidosis  - CSF oligoclonal bands neg   - transthyretin neg   - BH follow up 8/9; recommend outpatient neuro f/u

## 2023-08-19 NOTE — PROGRESS NOTE ADULT - PROBLEM SELECTOR PLAN 1
- pt experiencing heavy menses since 8/17  - she has not had her menses in over a year because she is perimenopausal  - She has been HD Stable   - Hb downtrending 10.9--> 8.8  - Gyn consult

## 2023-08-20 LAB
ALBUMIN SERPL ELPH-MCNC: 3.2 G/DL — LOW (ref 3.3–5)
ALP SERPL-CCNC: 61 U/L — SIGNIFICANT CHANGE UP (ref 40–120)
ALT FLD-CCNC: 21 U/L — SIGNIFICANT CHANGE UP (ref 4–33)
ANION GAP SERPL CALC-SCNC: 10 MMOL/L — SIGNIFICANT CHANGE UP (ref 7–14)
AST SERPL-CCNC: 32 U/L — SIGNIFICANT CHANGE UP (ref 4–32)
BILIRUB SERPL-MCNC: <0.2 MG/DL — SIGNIFICANT CHANGE UP (ref 0.2–1.2)
BLD GP AB SCN SERPL QL: NEGATIVE — SIGNIFICANT CHANGE UP
BUN SERPL-MCNC: 11 MG/DL — SIGNIFICANT CHANGE UP (ref 7–23)
CALCIUM SERPL-MCNC: 8.9 MG/DL — SIGNIFICANT CHANGE UP (ref 8.4–10.5)
CHLORIDE SERPL-SCNC: 103 MMOL/L — SIGNIFICANT CHANGE UP (ref 98–107)
CO2 SERPL-SCNC: 25 MMOL/L — SIGNIFICANT CHANGE UP (ref 22–31)
CREAT SERPL-MCNC: 0.76 MG/DL — SIGNIFICANT CHANGE UP (ref 0.5–1.3)
EGFR: 100 ML/MIN/1.73M2 — SIGNIFICANT CHANGE UP
GLUCOSE SERPL-MCNC: 114 MG/DL — HIGH (ref 70–99)
HCT VFR BLD CALC: 26.9 % — LOW (ref 34.5–45)
HGB BLD-MCNC: 8.8 G/DL — LOW (ref 11.5–15.5)
MCHC RBC-ENTMCNC: 28.4 PG — SIGNIFICANT CHANGE UP (ref 27–34)
MCHC RBC-ENTMCNC: 32.7 GM/DL — SIGNIFICANT CHANGE UP (ref 32–36)
MCV RBC AUTO: 86.8 FL — SIGNIFICANT CHANGE UP (ref 80–100)
NRBC # BLD: 0 /100 WBCS — SIGNIFICANT CHANGE UP (ref 0–0)
NRBC # FLD: 0 K/UL — SIGNIFICANT CHANGE UP (ref 0–0)
PLATELET # BLD AUTO: 195 K/UL — SIGNIFICANT CHANGE UP (ref 150–400)
POTASSIUM SERPL-MCNC: 3.8 MMOL/L — SIGNIFICANT CHANGE UP (ref 3.5–5.3)
POTASSIUM SERPL-SCNC: 3.8 MMOL/L — SIGNIFICANT CHANGE UP (ref 3.5–5.3)
PROT SERPL-MCNC: 6.3 G/DL — SIGNIFICANT CHANGE UP (ref 6–8.3)
RBC # BLD: 3.1 M/UL — LOW (ref 3.8–5.2)
RBC # FLD: 13.3 % — SIGNIFICANT CHANGE UP (ref 10.3–14.5)
RH IG SCN BLD-IMP: POSITIVE — SIGNIFICANT CHANGE UP
SODIUM SERPL-SCNC: 138 MMOL/L — SIGNIFICANT CHANGE UP (ref 135–145)
WBC # BLD: 5.17 K/UL — SIGNIFICANT CHANGE UP (ref 3.8–10.5)
WBC # FLD AUTO: 5.17 K/UL — SIGNIFICANT CHANGE UP (ref 3.8–10.5)

## 2023-08-20 PROCEDURE — 99221 1ST HOSP IP/OBS SF/LOW 40: CPT

## 2023-08-20 PROCEDURE — 99232 SBSQ HOSP IP/OBS MODERATE 35: CPT | Mod: GC

## 2023-08-20 PROCEDURE — 76830 TRANSVAGINAL US NON-OB: CPT | Mod: 26

## 2023-08-20 RX ORDER — METHOCARBAMOL 500 MG/1
750 TABLET, FILM COATED ORAL ONCE
Refills: 0 | Status: COMPLETED | OUTPATIENT
Start: 2023-08-20 | End: 2023-08-20

## 2023-08-20 RX ORDER — LANOLIN ALCOHOL/MO/W.PET/CERES
3 CREAM (GRAM) TOPICAL AT BEDTIME
Refills: 0 | Status: DISCONTINUED | OUTPATIENT
Start: 2023-08-20 | End: 2023-08-25

## 2023-08-20 RX ADMIN — METHOCARBAMOL 750 MILLIGRAM(S): 500 TABLET, FILM COATED ORAL at 06:20

## 2023-08-20 RX ADMIN — METHOCARBAMOL 750 MILLIGRAM(S): 500 TABLET, FILM COATED ORAL at 02:56

## 2023-08-20 RX ADMIN — ENOXAPARIN SODIUM 40 MILLIGRAM(S): 100 INJECTION SUBCUTANEOUS at 22:00

## 2023-08-20 RX ADMIN — Medication 1 APPLICATION(S): at 17:23

## 2023-08-20 RX ADMIN — METHOCARBAMOL 750 MILLIGRAM(S): 500 TABLET, FILM COATED ORAL at 22:00

## 2023-08-20 RX ADMIN — DULOXETINE HYDROCHLORIDE 60 MILLIGRAM(S): 30 CAPSULE, DELAYED RELEASE ORAL at 22:00

## 2023-08-20 RX ADMIN — METHOCARBAMOL 750 MILLIGRAM(S): 500 TABLET, FILM COATED ORAL at 12:03

## 2023-08-20 RX ADMIN — Medication 1 DROP(S): at 12:02

## 2023-08-20 RX ADMIN — Medication 500 MILLIGRAM(S): at 12:02

## 2023-08-20 RX ADMIN — Medication 200 MILLIGRAM(S): at 06:20

## 2023-08-20 RX ADMIN — Medication 200 MILLIGRAM(S): at 17:23

## 2023-08-20 RX ADMIN — ENOXAPARIN SODIUM 40 MILLIGRAM(S): 100 INJECTION SUBCUTANEOUS at 12:02

## 2023-08-20 RX ADMIN — Medication 3 MILLIGRAM(S): at 02:56

## 2023-08-20 NOTE — PROVIDER CONTACT NOTE (OTHER) - DATE AND TIME:
16-Jul-2023 15:30
27-Jul-2023 22:12
16-Jul-2023 06:35
17-Jul-2023 05:30
22-Jul-2023 10:02
12-Jul-2023 06:55
12-Jul-2023 13:31
27-Jul-2023 23:20
20-Aug-2023 04:04
17-Aug-2023 17:51

## 2023-08-20 NOTE — CONSULT NOTE ADULT - ATTENDING COMMENTS
43y female with peripheral neuropathy, sickle cell trait, asthma, anemia, fall, paresthesias, spinal disc protrusions consulted for blurry vision left eye. Vision intact OU. Eye exam essentially within normal limits except for dry eye OS > OD. Agree with plan above. Start AT. Please reconsult as needed.
Patient to r/o amyloidosis  plan   fat bad biopsy next week once patient agreeable    I have personally interviewed and examined this patient, reviewed pertinent labs and imaging, and discussed the case with colleagues, residents, and physician assistants on B Team rounds.    The active care issues are:  1. r/o amyloidosis    The Acute Care Surgery (B Team) Attending Group Practice:  Dr. Pastor Greene, Dr. Dillon Traore, Dr. Andry Lynch, Dr. Baron Hector,     urgent issues - spectra [phone]  nonurgent issues - [phone]  patient appointments or afterhours - [phone]
patient seen and examined at bedside. agree with above assessment and plan
11/2022 - Paresthesias in the feet and hands started.  Since then, she has had slowly progressive worsening intensity and gradually going up her legs to involve the entire leg up to the waist, B.  3/2023 - slowly progressive leg weakness and worsening gait difficulty.  She progressed to using a walker and now has difficulty walking even with the walker.   Sometimes when someone touches her feet she has an uncomfortable feeling shooting up her leg and body.     Motor exam:  NE 5  NF 5  R/L  Deltoid 4+/4+  Biceps 4+/4+  Triceps 4/4  WE 4+/4  WF 4+-->5, B  FF 5/4+  FE 4+/4  FAbd 4/4-  HF 4-/4-  KE 4+/4+  KF 4-/4-  DF 4+/4+  PF 5/5  EHL 4+/4-    Reflexes absent throughout.  Sensory: Decreased vibration in the R MM and absent in the L knee  Proprioception: decreased in the left fingers and left knee.  Slightly slow in the right toe. PP variable.  Minimal sensory "ataxia" in left arm.     A/P  Ms. 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   Rehab/PT/OT  I agree with work up and management as above.   D/W patient  Thank you.

## 2023-08-20 NOTE — PROGRESS NOTE ADULT - ATTENDING COMMENTS
Patient seen and examined, care d/w HS.    43F sickle cell trait, asthma, peripheral neuropathy who presents s/p fall with ongoing weakness/paresthesia. Patient has undergone extensive workup including: MRI H/spine, LP w/ CSF studies does not indicate any neuro-related culprits. Hospital course notable for questionable seizure-like activities but EEG negative.  Further workup including echo, fat pad biopsy, paraneoplastic panel, CT A/P all wnl/ no explanation for sx. C/f conversion disorder given negative workup. S/p multiple bedside meetings with Neurology and primary team. Patient reporting sx have been worse since LP and patient reports new symptoms though on chart review, patient documented to have these spasms/erratic movements prior to LP ( see chart note 7/25). This was also noted by medical team. Activity was shared w/ movement disorder specialist and multiple neuro specialists as per neurology team - no causes can be identified based on videos of movement activities and current diagnostics, appears to be functional. As such - medical/neurological workup is complete.  Now heavy vaginal bleedig. H/H dropping. Reports last menses was August/Sept of last year.     No complaints today.     # Spasms, paresthesias   - OP Psych follow up  - OP movement disorder specialist as OP if desired   - c/w duloxetine 60 daily, Lyrica 200 mg BID and methocarbamol 750 mg TID for symptomatic management - unclear benefit at this time. If signs she is not tolerating or developing adverse effect - would consider stopping.   - Patient seen by PMR- recommended for AR, then lost auth, re-evaluated by PMR- recommended for BOOM, auth pending but then PMR changed recommendations to AR. AR auth now pending.     # Liver lesions:  LFT's improving. Likely related to hepatic steatosis. Also noted to have indeterminate hypoechoic liver lesions. MRI recommended however patient required anesthesia for MRI brain.   - OP follow up with standing/open MRI or CT liver.     # Vaginal bleeding/Menses: Hb 11-->8  - bleeding slowing  - f/u gyn recs  - PO iron and vitamin C    # HTN: on amlodipine, last dose 8/11  - dc and resume as needed for HTN, SBP >130/90, thus far acceptable off meds     Obese BMI 40  Lovenox 40 mg BID    dc to AR pending auth

## 2023-08-20 NOTE — PROGRESS NOTE ADULT - PROBLEM SELECTOR PLAN 1
- pt experiencing heavy menses since 8/17  - she has not had her menses in over a year because she is perimenopausal  - She has been HD Stable   - Hb downtrending 10.9--> 8.8  - Gyn consult - pt experiencing heavy menses since 8/17  - she has not had her menses in over a year because she is perimenopausal  - She has been HD Stable   - Hb downtrending 10.9--> 8.8; 8.8 Stable  - Pending TVUS

## 2023-08-20 NOTE — CHART NOTE - NSCHARTNOTEFT_GEN_A_CORE
patient reporting increased lower extremity cramping.   stat CBC/CMP unremarkable  vitals stable  Patient assessed at bedside.   physical examination without significant findings. Patient actively experiencing cramping during examination first in the left leg and then in the right.    patient given 1 time dose of Robaxin 750 and melatonin

## 2023-08-20 NOTE — PROVIDER CONTACT NOTE (OTHER) - NAME OF MD/NP/PA/DO NOTIFIED:
Dr. Johnson
MD Bharat Venegas
Kandis Mcdowell
Kandis Mcdowell
MD Julia Alexandra
[email or web address]
Adrian Ordonez
Julia Alexandra MD
NATASHA LUX
MD Alexandra

## 2023-08-20 NOTE — PROVIDER CONTACT NOTE (OTHER) - RECOMMENDATIONS
Notify MD.
Notify MD.
Notify MD Bharat Venegas
Notify MD
notify MD
Notify MD Julia Alexandra.
notify MD
Notify MD
for MD to assess patient at bedside and give stat dose of robaxin
notify MD, blood pressure medication

## 2023-08-20 NOTE — PROVIDER CONTACT NOTE (OTHER) - REASON
Jk=217/109
Blood clots present with menstruation
severe muscle spams b/l LE
/109
EC=444/100 at admission to [facility]
High diastolic
*New finding* Patient reports muscle spams throughout entire body.
Lp=263/106
Patient complaining of chest discomfort
Patient refused baclofen 10mg medication

## 2023-08-20 NOTE — PROVIDER CONTACT NOTE (OTHER) - SITUATION
High diastolic
Patient complaining of chest discomfort. Patient states discomfort is not new.
Vp=685/106
Qc=589/109 during morning vitals
*New finding* Patient reports muscle spams throughout entire body. Patient reported new onset muscle spasms and tremors post lumbar puncture.
severe muscle spams b/l LE
*New finding* Patient reports muscle spams throughout entire body. Patient reported new onset muscle spasms and tremors post lumbar puncture. Patient offered baclofen 10mg, patient refused.
Patient afternoon vitals /109
Patient began menstruation cycle yesterday 08/16/23. Patient states she has no had her menstruation cycle for 1 year. Blood flow is bright red, heavy with clots present.
VT=086/100 at admission to [facility]

## 2023-08-20 NOTE — PROVIDER CONTACT NOTE (OTHER) - ACTION/TREATMENT ORDERED:
MD made aware and with order for stat cbc and bmp and robaxin
MD notified. give amlodipine as ordered
Md notified. continue to monitor.
MD Julia Alexandra made aware. No new interventions noted at this time.
MD made aware. Administer amlodipine 5mg.
MD notified. MD aware. MD increased Baclofen dosage. Will continue to monitor. No further instructions at this time.
no further interventions. Continue to monitor.
MD made aware. No new interventions ordered at this time.
MD notified. MD aware. Will continue to monitor. No further instructions at this time.
MD Bharat Venegas made aware. CBC ordered.

## 2023-08-20 NOTE — PROGRESS NOTE ADULT - SUBJECTIVE AND OBJECTIVE BOX
INCOMPLETE NOTE    Jeremiah Hayley | PGY2| Pager: [place] ([phone]) -- [place] ([phone])  Interval Events:    REVIEW OF SYSTEMS:  CONSTITUTIONAL: No weakness, fevers or chills  EYES/ENT: No visual changes;  No vertigo or throat pain   NECK: No pain or stiffness  RESPIRATORY: No cough, wheezing, hemoptysis; No shortness of breath  CARDIOVASCULAR: No chest pain or palpitations  GASTROINTESTINAL: No abdominal or epigastric pain. No nausea, vomiting, or hematemesis; No diarrhea or constipation. No melena or hematochezia.  GENITOURINARY: No dysuria, frequency or hematuria  NEUROLOGICAL: No numbness or weakness  SKIN: No itching, burning, rashes, or lesions   All other review of systems is negative unless indicated above.    OBJECTIVE:  ICU Vital Signs Last 24 Hrs  T(C): 36.6 (20 Aug 2023 02:50), Max: 36.9 (19 Aug 2023 15:00)  T(F): 97.8 (20 Aug 2023 02:50), Max: 98.5 (19 Aug 2023 15:00)  HR: 99 (20 Aug 2023 02:50) (85 - 99)  BP: 112/71 (20 Aug 2023 02:50) (112/71 - 126/68)  BP(mean): --  ABP: --  ABP(mean): --  RR: 18 (20 Aug 2023 02:50) (17 - 18)  SpO2: 99% (20 Aug 2023 02:50) (99% - 100%)    O2 Parameters below as of 20 Aug 2023 02:50  Patient On (Oxygen Delivery Method): room air              CAPILLARY BLOOD GLUCOSE          PHYSICAL EXAM:  General: WN/WD NAD  Neurology: A&Ox3, nonfocal, SHARP x 4  Eyes: PERRLA/ EOMI, Gross vision intact  ENT/Neck: Neck supple, trachea midline, No JVD, Gross hearing intact  Respiratory: CTA B/L, No wheezing, rales, rhonchi  CV: RRR, +S1/S2, -S3/S4, no murmurs, rubs or gallops  Abdominal: Soft, NT, ND +BS, No HSM  MSK: 5/5 strength UE/LE bilaterally  Extremities: No edema, 2+ peripheral pulses  Skin: No Rashes, Hematoma, Ecchymosis  Incisions:   Tubes:    HOSPITAL MEDICATIONS:  MEDICATIONS  (STANDING):  artificial  tears Solution 1 Drop(s) Both EYES four times a day  ascorbic acid 500 milliGRAM(s) Oral daily  DULoxetine 60 milliGRAM(s) Oral at bedtime  enoxaparin Injectable 40 milliGRAM(s) SubCutaneous every 12 hours  ferrous    sulfate 325 milliGRAM(s) Oral <User Schedule>  lidocaine   4% Patch 1 Patch Transdermal daily  methocarbamol 750 milliGRAM(s) Oral three times a day  nystatin/triamcinolone Ointment 1 Application(s) Topical two times a day  polyethylene glycol 3350 17 Gram(s) Oral daily  pregabalin 200 milliGRAM(s) Oral two times a day    MEDICATIONS  (PRN):  melatonin 3 milliGRAM(s) Oral at bedtime PRN Insomnia      LABS:                        8.8    5.17  )-----------( 195      ( 20 Aug 2023 02:40 )             26.9     Hgb Trend: 8.8<--, 8.8<--, 9.4<--, 9.5<--, 10.9<--  08-20    138  |  103  |  11  ----------------------------<  114<H>  3.8   |  25  |  0.76    Ca    8.9      20 Aug 2023 02:40  Phos  4.4     08-19  Mg     1.90     08-19    TPro  6.3  /  Alb  3.2<L>  /  TBili  <0.2  /  DBili  x   /  AST  32  /  ALT  21  /  AlkPhos  61  08-20    Creatinine Trend: 0.76<--, 0.54<--, 0.53<--, 0.56<--, 0.44<--, 0.48<--    Urinalysis Basic - ( 20 Aug 2023 02:40 )    Color: x / Appearance: x / SG: x / pH: x  Gluc: 114 mg/dL / Ketone: x  / Bili: x / Urobili: x   Blood: x / Protein: x / Nitrite: x   Leuk Esterase: x / RBC: x / WBC x   Sq Epi: x / Non Sq Epi: x / Bacteria: x            MICROBIOLOGY:          Jeremiah Hardy | PGY2| Pager: [place] ([phone]) -- [place] ([phone])  Interval Events: Patient reportedly with leg cramping overnight, which is normal for her, required robaxin, endorsed minimal improvement, but reports discomfort is at baseline in the morning and did not request any other medication. Endorsed continued vaginal bleeding, but improvement from prior day. VS Stable; hgb stable; pending TVUS a/p GYN    OBJECTIVE:  ICU Vital Signs Last 24 Hrs  T(C): 36.6 (20 Aug 2023 02:50), Max: 36.9 (19 Aug 2023 15:00)  T(F): 97.8 (20 Aug 2023 02:50), Max: 98.5 (19 Aug 2023 15:00)  HR: 99 (20 Aug 2023 02:50) (85 - 99)  BP: 112/71 (20 Aug 2023 02:50) (112/71 - 126/68)  BP(mean): --  ABP: --  ABP(mean): --  RR: 18 (20 Aug 2023 02:50) (17 - 18)  SpO2: 99% (20 Aug 2023 02:50) (99% - 100%)    O2 Parameters below as of 20 Aug 2023 02:50  Patient On (Oxygen Delivery Method): room air              CAPILLARY BLOOD GLUCOSE        PHYSICAL EXAM:  General: WN/WD NAD  Neurology: A&Ox3, nonfocal, SHARP x 4  ENT/Neck: Neck supple, trachea midline, No JVD, Gross hearing intact  Respiratory: CTA B/L, No wheezing, rales, rhonchi  CV: RRR, +S1/S2, -S3/S4  Abdominal: Soft, NT, ND +BS, No HSM  MSK: 5/5 strength UE/LE bilaterally  Extremities: No edema, 2+ peripheral pulses      HOSPITAL MEDICATIONS:  MEDICATIONS  (STANDING):  artificial  tears Solution 1 Drop(s) Both EYES four times a day  ascorbic acid 500 milliGRAM(s) Oral daily  DULoxetine 60 milliGRAM(s) Oral at bedtime  enoxaparin Injectable 40 milliGRAM(s) SubCutaneous every 12 hours  ferrous    sulfate 325 milliGRAM(s) Oral <User Schedule>  lidocaine   4% Patch 1 Patch Transdermal daily  methocarbamol 750 milliGRAM(s) Oral three times a day  nystatin/triamcinolone Ointment 1 Application(s) Topical two times a day  polyethylene glycol 3350 17 Gram(s) Oral daily  pregabalin 200 milliGRAM(s) Oral two times a day    MEDICATIONS  (PRN):  melatonin 3 milliGRAM(s) Oral at bedtime PRN Insomnia      LABS:                        8.8    5.17  )-----------( 195      ( 20 Aug 2023 02:40 )             26.9     Hgb Trend: 8.8<--, 8.8<--, 9.4<--, 9.5<--, 10.9<--  08-20    138  |  103  |  11  ----------------------------<  114<H>  3.8   |  25  |  0.76    Ca    8.9      20 Aug 2023 02:40  Phos  4.4     08-19  Mg     1.90     08-19    TPro  6.3  /  Alb  3.2<L>  /  TBili  <0.2  /  DBili  x   /  AST  32  /  ALT  21  /  AlkPhos  61  08-20    Creatinine Trend: 0.76<--, 0.54<--, 0.53<--, 0.56<--, 0.44<--, 0.48<--    Urinalysis Basic - ( 20 Aug 2023 02:40 )    Color: x / Appearance: x / SG: x / pH: x  Gluc: 114 mg/dL / Ketone: x  / Bili: x / Urobili: x   Blood: x / Protein: x / Nitrite: x   Leuk Esterase: x / RBC: x / WBC x   Sq Epi: x / Non Sq Epi: x / Bacteria: x            MICROBIOLOGY:          Jeremiah Hardy | PGY2| Pager: [place] ([phone]) -- [place] ([phone])  Interval Events: Patient reportedly with leg cramping overnight, which is normal for her, required robaxin, endorsed minimal improvement, but reports discomfort is at baseline in the morning and did not request any other medication. Endorsed continued vaginal bleeding, but improvement from prior day. VS Stable; hgb stable; pending TVUS a/p GYN    OBJECTIVE:  ICU Vital Signs Last 24 Hrs  T(C): 36.6 (20 Aug 2023 02:50), Max: 36.9 (19 Aug 2023 15:00)  T(F): 97.8 (20 Aug 2023 02:50), Max: 98.5 (19 Aug 2023 15:00)  HR: 99 (20 Aug 2023 02:50) (85 - 99)  BP: 112/71 (20 Aug 2023 02:50) (112/71 - 126/68)  RR: 18 (20 Aug 2023 02:50) (17 - 18)  SpO2: 99% (20 Aug 2023 02:50) (99% - 100%)    O2 Parameters below as of 20 Aug 2023 02:50  Patient On (Oxygen Delivery Method): room air    PHYSICAL EXAM:  General: WN/WD NAD  Neurology: A&Ox3, nonfocal, SHARP x 4  ENT/Neck: Neck supple, trachea midline, No JVD, Gross hearing intact  Respiratory: CTA B/L, No wheezing, rales, rhonchi  CV: RRR, +S1/S2, -S3/S4  Abdominal: Soft, NT, ND +BS, No HSM  MSK: 5/5 strength UE/LE bilaterally  Extremities: No edema, 2+ peripheral pulses    HOSPITAL MEDICATIONS:  MEDICATIONS  (STANDING):  artificial  tears Solution 1 Drop(s) Both EYES four times a day  ascorbic acid 500 milliGRAM(s) Oral daily  DULoxetine 60 milliGRAM(s) Oral at bedtime  enoxaparin Injectable 40 milliGRAM(s) SubCutaneous every 12 hours  ferrous    sulfate 325 milliGRAM(s) Oral <User Schedule>  lidocaine   4% Patch 1 Patch Transdermal daily  methocarbamol 750 milliGRAM(s) Oral three times a day  nystatin/triamcinolone Ointment 1 Application(s) Topical two times a day  polyethylene glycol 3350 17 Gram(s) Oral daily  pregabalin 200 milliGRAM(s) Oral two times a day    MEDICATIONS  (PRN):  melatonin 3 milliGRAM(s) Oral at bedtime PRN Insomnia    LABS:                        8.8    5.17  )-----------( 195      ( 20 Aug 2023 02:40 )             26.9     Hgb Trend: 8.8<--, 8.8<--, 9.4<--, 9.5<--, 10.9<--  08-20    138  |  103  |  11  ----------------------------<  114<H>  3.8   |  25  |  0.76    Ca    8.9      20 Aug 2023 02:40  Phos  4.4     08-19  Mg     1.90     08-19    TPro  6.3  /  Alb  3.2<L>  /  TBili  <0.2  /  DBili  x   /  AST  32  /  ALT  21  /  AlkPhos  61  08-20    Creatinine Trend: 0.76<--, 0.54<--, 0.53<--, 0.56<--, 0.44<--, 0.48<--    Urinalysis Basic - ( 20 Aug 2023 02:40 )  Color: x / Appearance: x / SG: x / pH: x  Gluc: 114 mg/dL / Ketone: x  / Bili: x / Urobili: x   Blood: x / Protein: x / Nitrite: x   Leuk Esterase: x / RBC: x / WBC x   Sq Epi: x / Non Sq Epi: x / Bacteria: x

## 2023-08-20 NOTE — CONSULT NOTE ADULT - SUBJECTIVE AND OBJECTIVE BOX
KAILASH FUENTES  43y  Female [id number]    HPI:  Ms Fuentes is a 44yo woman with a hx of peripheral neuropathy, sickle cell trait, asthma, and anemia admitted to medicine on 7/12/23 after falling at home 2/2 weakness and paresthesias. On 8/17/23, patient developed vaginal bleeding and gyn consulted on 8/19/23 after hemoglobin dropped from 10.9 to 8.8.      Name of GYN Physician:   OBHx:    GynHx: Denies fibroids, cysts, endometriosis, STI's, Abnormal pap smears   PMH:  PSH:  Meds:  Allx:  Social History:  Denies smoking use, drug use, alcohol use.   +occasional social alcohol use    Vital Signs Last 24 Hrs  T(C): 36.6 (20 Aug 2023 02:50), Max: 36.9 (19 Aug 2023 15:00)  T(F): 97.8 (20 Aug 2023 02:50), Max: 98.5 (19 Aug 2023 15:00)  HR: 99 (20 Aug 2023 02:50) (85 - 99)  BP: 112/71 (20 Aug 2023 02:50) (112/71 - 126/68)  BP(mean): --  RR: 18 (20 Aug 2023 02:50) (17 - 18)  SpO2: 99% (20 Aug 2023 02:50) (99% - 100%)    Parameters below as of 20 Aug 2023 02:50  Patient On (Oxygen Delivery Method): room air        Physical Exam:   General: sitting comfortably in bed, NAD   HEENT: neck supple, full ROM  CV: RRR  Lungs: CTA b/l, good air flow b/l   Back: No CVA tenderness  Abd: Soft, non-tender, non-distended.  Bowel sounds present.    :  No bleeding on pad.    External labia wnl.  Bimanual exam with no cervical motion tenderness, uterus wnl, adnexa non palpable b/l.  Cervix closed vs. Cervix dilated  Speculum Exam: No active bleeding from os.  Physiologic discharge.    Ext: non-tender b/l, no edema     LABS:                              8.8    5.17  )-----------( 195      ( 20 Aug 2023 02:40 )             26.9     08-20    138  |  103  |  11  ----------------------------<  114<H>  3.8   |  25  |  0.76    Ca    8.9      20 Aug 2023 02:40  Phos  4.4     08-19  Mg     1.90     08-19    TPro  6.3  /  Alb  3.2<L>  /  TBili  <0.2  /  DBili  x   /  AST  32  /  ALT  21  /  AlkPhos  61  08-20    I&O's Detail      Urinalysis Basic - ( 20 Aug 2023 02:40 )    Color: x / Appearance: x / SG: x / pH: x  Gluc: 114 mg/dL / Ketone: x  / Bili: x / Urobili: x   Blood: x / Protein: x / Nitrite: x   Leuk Esterase: x / RBC: x / WBC x   Sq Epi: x / Non Sq Epi: x / Bacteria: x        RADIOLOGY & ADDITIONAL STUDIES:  < from: CT Abdomen and Pelvis w/ IV Cont (07.19.23 @ 13:42) >  ACC: [id number] EXAM:  CT ABDOMEN AND PELVIS IC   ORDERED BY: CHANTALE FREEMAN     PROCEDURE DATE:  07/19/2023          INTERPRETATION:  CLINICAL INFORMATION: Transaminitis. Liver lesion on   ultrasound.    COMPARISON: Ultrasound 7/16/2023.    CONTRAST/COMPLICATIONS:  IV Contrast: Omnipaque 350  90 cc administered   10 cc discarded  Oral Contrast: NONE  Complications: None reported at time of study completion    PROCEDURE:  CT of the Abdomen and Pelvis was performed.  Sagittal and coronal reformats were performed.    FINDINGS:  LOWER CHEST: Within normal limits.    LIVER: The liver is markedly heterogeneous with multiple patchy areas of   hypodensities. Vessels are seen coursing through these hypodense areas.   No capsular retraction. The hepaticveins are patent. The main and the   left portal vein are patent. The right portal veins are not well   visualized.  BILE DUCTS: Normal caliber.  GALLBLADDER: Cholecystectomy.  SPLEEN: Within normal limits.  PANCREAS: Within normal limits.  ADRENALS:Within normal limits.  KIDNEYS/URETERS: Ill-defined linear hypodensities in the right kidney.   Left renal cysts.    BLADDER: Within normal limits.  REPRODUCTIVE ORGANS: Uterus and adnexa within normal limits.    BOWEL: No bowel obstruction. Appendixis normal.  PERITONEUM: No ascites.  VESSELS: Within normal limits.  RETROPERITONEUM/LYMPH NODES: No lymphadenopathy.  ABDOMINAL WALL: Within normal limits.  BONES: Within normal limits.    IMPRESSION:  Findings in the liver can be secondary to steatosis. This can be better   evaluated with MRI.  Suggestion of mild right pyelonephritis.      --- End of Report ---            AGUEDA BERRY MD; Attending Radiologist  This document has been electronically signed. Jul 19 2023  1:52PM    < end of copied text >     KAILASH PEREZ  43y  Female [id number]    HPI:  Ms. Perez is a 44yo  with a hx of peripheral neuropathy, sickle cell trait, asthma, fatty liver and anemia admitted to medicine on 23 after falling at home 2/2 weakness and paresthesias. On 23, patient developed vaginal bleeding and gyn consulted on 23 after hemoglobin dropped from 10.9 to 8.8. Per the patient, she believes she is postmenopausal. She used to have regular monthly menses lasting for about 5 days. Pt had a Mirena IUD until  and she continued to get her menses with her IUD. She then went without a period from 2021 until 2022 when she experienced an episode of heavy vaginal bleeding. She had no more vaginal bleeding for almost a year until this current episode. Pt states that she does not have a gynecologist and she last saw one when she had her youngest child in  (either Dr. Hughes or Dr. Anderson). She got pregnant with her IUD in place and did not restart any contraception after the birth of her baby. She does not recall ever being told she has a history of PCOS. Prior to this hospitalization, patient was sexually active with one partner with no barrier or chemical contraception. Here in the hospital, patient states that she has been having heavy vaginal bleeding for the past two days but today it has decreased. She has a primafit so is not saturating pads, but she states that she has noticed passage of clots. She denies dizziness, lightheadedness, SOB, chest pain, palpitations, purulent discharge, fever, chills.     Health care maintenance:  Mammogram: none (missed appointment during this admission, will reschedule)  Pap: She sees her PCP for pap smears, Dr. Marvin Bowers, most recent pap 2023, normal per pt  Colonosopy: none      Name of GYN Physician: previously saw Dr. Hughes or Dr. Anderson, last seen in   OBHx:    x5 between [id number], first pregnancy complicated by PEC  SAB x1  MAB x1 w/ D&C c/b PPH s/p blood transfusion   ectopic pregnancy s/p LSC   TOP x4  GynHx: h/o gonorrhea and chlamydia s/p treatment in . Denies fibroids, cysts, endometriosis, Abnormal pap smears   PMH: peripheral neuropathy, sickle cell trait, asthma, anemia, fatty liver, depression in therapy no meds  PSH: D&C, [facility] lucy , [facility] surgery for ectopic (unknown side)   Meds: no home meds  Allx: latex  Social History:  endorses alcohol use 3x weekly, rare marijuana use, no tobacco use. Lives at home with two children. Feels safe at home.   FH: maternal grandmother with breast cancer diagnosed around 50y old, maternal uncle pancreatic cancer      Vital Signs Last 24 Hrs  T(C): 36.6 (20 Aug 2023 02:50), Max: 36.9 (19 Aug 2023 15:00)  T(F): 97.8 (20 Aug 2023 02:50), Max: 98.5 (19 Aug 2023 15:00)  HR: 99 (20 Aug 2023 02:50) (85 - 99)  BP: 112/71 (20 Aug 2023 02:50) (112/71 - 126/68)  BP(mean): --  RR: 18 (20 Aug 2023 02:50) (17 - 18)  SpO2: 99% (20 Aug 2023 02:50) (99% - 100%)    Parameters below as of 20 Aug 2023 02:50  Patient On (Oxygen Delivery Method): room air        Physical Exam:   General: sitting comfortably in bed, NAD, morbidly obese  CV: RRR  Lungs: CTA b/l, good air flow b/l   Abd: Soft, non-tender, non-distended.    : blood clots on primafit. External labia wnl.  Bimanual exam with no cervical motion tenderness, uterus not palpable, adnexa non palpable b/l.    Speculum Exam: No active bleeding from os. 5cc of blood in vault.    Ext: non-tender b/l, discomfort when positioning legs in bed    LABS:                              8.8    5.17  )-----------( 195      ( 20 Aug 2023 02:40 )             26.9     08-20    138  |  103  |  11  ----------------------------<  114<H>  3.8   |  25  |  0.76    Ca    8.9      20 Aug 2023 02:40  Phos  4.4     08-19  Mg     1.90     08-    TPro  6.3  /  Alb  3.2<L>  /  TBili  <0.2  /  DBili  x   /  AST  32  /  ALT  21  /  AlkPhos  61      I&O's Detail      Urinalysis Basic - ( 20 Aug 2023 02:40 )    Color: x / Appearance: x / SG: x / pH: x  Gluc: 114 mg/dL / Ketone: x  / Bili: x / Urobili: x   Blood: x / Protein: x / Nitrite: x   Leuk Esterase: x / RBC: x / WBC x   Sq Epi: x / Non Sq Epi: x / Bacteria: x    hCG <1    RADIOLOGY & ADDITIONAL STUDIES:  < from: CT Abdomen and Pelvis w/ IV Cont (23 @ 13:42) >  ACC: [id number] EXAM:  CT ABDOMEN AND PELVIS IC   ORDERED BY: CHANTALE FREEMAN     PROCEDURE DATE:  2023          INTERPRETATION:  CLINICAL INFORMATION: Transaminitis. Liver lesion on   ultrasound.    COMPARISON: Ultrasound 2023.    CONTRAST/COMPLICATIONS:  IV Contrast: Omnipaque 350  90 cc administered   10 cc discarded  Oral Contrast: NONE  Complications: None reported at time of study completion    PROCEDURE:  CT of the Abdomen and Pelvis was performed.  Sagittal and coronal reformats were performed.    FINDINGS:  LOWER CHEST: Within normal limits.    LIVER: The liver is markedly heterogeneous with multiple patchy areas of   hypodensities. Vessels are seen coursing through these hypodense areas.   No capsular retraction. The hepaticveins are patent. The main and the   left portal vein are patent. The right portal veins are not well   visualized.  BILE DUCTS: Normal caliber.  GALLBLADDER: Cholecystectomy.  SPLEEN: Within normal limits.  PANCREAS: Within normal limits.  ADRENALS:Within normal limits.  KIDNEYS/URETERS: Ill-defined linear hypodensities in the right kidney.   Left renal cysts.    BLADDER: Within normal limits.  REPRODUCTIVE ORGANS: Uterus and adnexa within normal limits.    BOWEL: No bowel obstruction. Appendixis normal.  PERITONEUM: No ascites.  VESSELS: Within normal limits.  RETROPERITONEUM/LYMPH NODES: No lymphadenopathy.  ABDOMINAL WALL: Within normal limits.  BONES: Within normal limits.    IMPRESSION:  Findings in the liver can be secondary to steatosis. This can be better   evaluated with MRI.  Suggestion of mild right pyelonephritis.      --- End of Report ---            AGUEDA BERRY MD; Attending Radiologist  This document has been electronically signed. 2023  1:52PM    < end of copied text >

## 2023-08-20 NOTE — PROGRESS NOTE ADULT - ASSESSMENT
Ms Perez is a 42yo woman with a hx of peripheral neuropathy, sickle cell trait, asthma, and anemia who was admitted after suffering a fall with inability to get up due to 4 months of ongoing bilateral lower and upper extremity weakness and paresthesias. Homocysteine slightly elevated however rest of lab workup unremarkable so far. EMG not consistent with CIDP. MRI head, C/T spine completed 7/21 showing C4-C6 disc protrusions causing ventral cord deformity, not consistent with clinical presentation. LP and MRI spine also done under anesthesia with unremarkable findings. Now also experiencing heavy menses,  Ms Perez is a 42yo woman with a hx of peripheral neuropathy, sickle cell trait, asthma, and anemia who was admitted after suffering a fall with inability to get up due to 4 months of ongoing bilateral lower and upper extremity weakness and paresthesias. Homocysteine slightly elevated however rest of lab workup unremarkable so far. EMG not consistent with CIDP. MRI head, C/T spine completed 7/21 showing C4-C6 disc protrusions causing ventral cord deformity, not consistent with clinical presentation. LP and MRI spine also done under anesthesia with unremarkable findings. With episode of heavy menses 8/17 after 1 year without menses; with persistent bleeding, but decreased amount, pending TVUS with GYN evalaution.

## 2023-08-20 NOTE — PROVIDER CONTACT NOTE (OTHER) - BACKGROUND
43 year old female admitted status post fall at home with bilateral lower extremity weakness. History of peripheral neuropathy and asthma.
44 y/o female admitted for other symptom or sign involving musculoskeletal system with hx of peripheral neuropathy, asthma, sickle cell trait, anemia
44yo F, hx of peripheral neuropathy, asthma, anemia, presents with bilateral lower extremity weakness for 1-2 weeks to the point where she cant ambulate and has fallen 3x and chest pain.
Patient admitted after suffering a fall with ongoing bilateral upper extremity and lower extremity weakness and paresthesias. PMH includes peripheral neuropathy, anemia, asthma, high blood pressure.
43 year old female admitted for peripheral neuropathy. History of asthma and anemia.
Patient admitted after suffering a fall with ongoing bilateral upper extremity and lower extremity weakness and paresthesias. PMH includes peripheral neuropathy, anemia, asthma, high blood pressure.
Patient admitted for other symptom or sign involving musculoskeletal system.   PMH of peripheral neuropathy, sickle cell trait, asthma, anemia.
Patient admitted with other symptom or sign involving musculoskeletal system
43 year old female admitted status post fall at home with bilateral lower extremity weakness. History of peripheral neuropathy and asthma.
Patient admitted with other symptom or sign involving musculoskeletal system

## 2023-08-20 NOTE — CONSULT NOTE ADULT - CONSULT REASON
Rehabilitation evaluation
weakness and numbness/paresthesias
Cervical stenosiss
blurry vision left eye
r/o amyloidosis
vaginal bleeding

## 2023-08-20 NOTE — CONSULT NOTE ADULT - ASSESSMENT
THIS NOTE IS INCOMPLETE Ms. Perez is a 44yo  with a hx of peripheral neuropathy, sickle cell trait, asthma, fatty liver and anemia admitted to medicine on 23 after falling at home 2/2 weakness and paresthesias. Gyn consulted for AUB. Patient young to be postmenopausal, must rule out other causes. Differential diagnosis includes, menopause, PCOS, primary ovarian insufficiency.     #Abnormal uterine bleeding  - TVUS  - prolactin, FSH/LH, testosterone/DHEA  - Pt may need endometrial biopsy  - no active bleeding from os, H/H stable, no need to transfuse at this time  - continue to monitor for heavy vaginal bleeding and symptoms of anemia    D/w Dr. vIeth Davis PGY2

## 2023-08-20 NOTE — CONSULT NOTE ADULT - REASON FOR ADMISSION
Weakness in lower and upper extremities

## 2023-08-20 NOTE — CONSULT NOTE ADULT - CONSULT REQUESTED DATE/TIME
12-Jul-2023 20:21
20-Aug-2023 11:07
19-Jul-2023 09:20
20-Jul-2023
21-Jul-2023 15:59
29-Jul-2023 15:41

## 2023-08-20 NOTE — PROVIDER CONTACT NOTE (OTHER) - ASSESSMENT
*New finding* Patient reports muscle spams throughout entire body. Patient reported new onset muscle spasms and tremors post lumbar puncture.
/109 HR 95 oral temp 97.9 RR 20, O2 sat 100% on room air
IF=350/100 at admission to 9[facility]. no acute distress
Patient assessment complete. Blood clots present in menstruation flow. No signs or symptoms of distress noted at this time.
*New finding* Patient reports muscle spams throughout entire body. Patient reported new onset muscle spasms and tremors post lumbar puncture. Spasms has occurred twice so far during my shift
severe muscle spams b/l LE with v/s stable; alert and responsive, not in distress
Patient asymptomatic
Xd=283/106 no acute distress
Ci=860/109. no acute distress. aox3
Patient assessed. Vitals stable. /88 Pulse 96 O2 98% on room air RR 18 temp 98.1 F.

## 2023-08-21 LAB
FSH SERPL-MCNC: 4.9 IU/L — SIGNIFICANT CHANGE UP
LH SERPL-ACNC: 3.7 IU/L — SIGNIFICANT CHANGE UP
PROLACTIN SERPL-MCNC: 93.8 NG/ML — HIGH (ref 3.4–24.1)
TESTOST FREE+TOTAL PANEL SERPL-MCNC: 27 NG/DL — SIGNIFICANT CHANGE UP (ref 8.4–48.1)

## 2023-08-21 PROCEDURE — 99232 SBSQ HOSP IP/OBS MODERATE 35: CPT | Mod: GC

## 2023-08-21 RX ADMIN — Medication 200 MILLIGRAM(S): at 05:25

## 2023-08-21 RX ADMIN — Medication 1 DROP(S): at 11:48

## 2023-08-21 RX ADMIN — ENOXAPARIN SODIUM 40 MILLIGRAM(S): 100 INJECTION SUBCUTANEOUS at 21:11

## 2023-08-21 RX ADMIN — Medication 1 APPLICATION(S): at 17:19

## 2023-08-21 RX ADMIN — DULOXETINE HYDROCHLORIDE 60 MILLIGRAM(S): 30 CAPSULE, DELAYED RELEASE ORAL at 21:11

## 2023-08-21 RX ADMIN — Medication 200 MILLIGRAM(S): at 17:18

## 2023-08-21 RX ADMIN — METHOCARBAMOL 750 MILLIGRAM(S): 500 TABLET, FILM COATED ORAL at 05:25

## 2023-08-21 RX ADMIN — Medication 500 MILLIGRAM(S): at 11:48

## 2023-08-21 RX ADMIN — ENOXAPARIN SODIUM 40 MILLIGRAM(S): 100 INJECTION SUBCUTANEOUS at 11:47

## 2023-08-21 RX ADMIN — METHOCARBAMOL 750 MILLIGRAM(S): 500 TABLET, FILM COATED ORAL at 12:33

## 2023-08-21 RX ADMIN — METHOCARBAMOL 750 MILLIGRAM(S): 500 TABLET, FILM COATED ORAL at 21:11

## 2023-08-21 NOTE — PROGRESS NOTE ADULT - PROBLEM SELECTOR PLAN 4
- Hypoechoic liver lesions and hepatic steatosis on RUQ US  - Hepatitis panel, HIV (-)  - CT A/P consistent with hepatic steatosis  - AST/ALT (36/26) 8/7/23  - ensure patient is not on any hepatotoxic meds  - recommend f/u outpatient for MRI abdomen to better visualize liver lesions - Hypoechoic liver lesions and hepatic steatosis on RUQ US  - Hepatitis panel, HIV (-)  - CT A/P consistent with hepatic steatosis  - AST/ALT (36/26) 8/7/23    Plan  - ensure patient is not on any hepatotoxic meds  - recommend f/u outpatient for MRI abdomen to better visualize liver lesions

## 2023-08-21 NOTE — PROGRESS NOTE ADULT - ASSESSMENT
Ms Perez is a 42yo woman with a hx of peripheral neuropathy, sickle cell trait, asthma, and anemia who was admitted after suffering a fall with inability to get up due to 4 months of ongoing bilateral lower and upper extremity weakness and paresthesias. Homocysteine slightly elevated however rest of lab workup unremarkable so far. EMG not consistent with CIDP. MRI head, C/T spine completed 7/21 showing C4-C6 disc protrusions causing ventral cord deformity, not consistent with clinical presentation. LP and MRI spine also done under anesthesia with unremarkable findings. With episode of heavy menses 8/17 after 1 year without menses; with persistent bleeding, but decreased amount, pending TVUS with GYN evalaution. Ms Perez is a 44yo woman with a hx of peripheral neuropathy, sickle cell trait, asthma, and anemia who was admitted after suffering a fall with inability to get up due to 4 months of ongoing bilateral lower and upper extremity weakness and paresthesias. Homocysteine slightly elevated however rest of lab workup unremarkable so far. EMG not consistent with CIDP. MRI head, C/T spine completed 7/21 showing C4-C6 disc protrusions causing ventral cord deformity, not consistent with clinical presentation. LP and MRI spine also done under anesthesia with unremarkable findings. With episode of heavy menses 8/17 after 1 year without menses; with persistent bleeding, but decreased amount, TVUS with 7mm thickness of endometrium warranting possible endometrial biopsy

## 2023-08-21 NOTE — PROGRESS NOTE ADULT - SUBJECTIVE AND OBJECTIVE BOX
***************************************************************  Yuri Savage, PGY1  Internal Medicine   TEAMS Preferred  ***************************************************************    KAILASH FUENTES  43y Female  MRN: [id number]  07-12-23 (40d)    Patient is a 43y old  Female who presents with a chief complaint of Weakness in lower and upper extremities (20 Aug 2023 11:07)      SUBJECTIVE / OVERNIGHT EVENTS:   No acute overnight events.   Pt seen and examined at bedside this AM. Denies any CP, SOB, N/V, constipation, diarrhea, abdominal pain, dysuria, fever, chills, or headaches. Last BM     12 Point ROS negative with the exception of the above    MEDICATIONS  (STANDING):  artificial  tears Solution 1 Drop(s) Both EYES four times a day  ascorbic acid 500 milliGRAM(s) Oral daily  DULoxetine 60 milliGRAM(s) Oral at bedtime  enoxaparin Injectable 40 milliGRAM(s) SubCutaneous every 12 hours  ferrous    sulfate 325 milliGRAM(s) Oral <User Schedule>  lidocaine   4% Patch 1 Patch Transdermal daily  methocarbamol 750 milliGRAM(s) Oral three times a day  nystatin/triamcinolone Ointment 1 Application(s) Topical two times a day  polyethylene glycol 3350 17 Gram(s) Oral daily  pregabalin 200 milliGRAM(s) Oral two times a day    MEDICATIONS  (PRN):  melatonin 3 milliGRAM(s) Oral at bedtime PRN Insomnia      OBJECTIVE:  Vital Signs Last 24 Hrs  T(C): 36.7 (21 Aug 2023 05:20), Max: 36.8 (20 Aug 2023 15:40)  T(F): 98 (21 Aug 2023 05:20), Max: 98.2 (20 Aug 2023 15:40)  HR: 84 (21 Aug 2023 05:20) (83 - 86)  BP: 112/71 (21 Aug 2023 05:20) (111/60 - 115/73)  BP(mean): --  RR: 17 (21 Aug 2023 05:20) (17 - 18)  SpO2: 98% (21 Aug 2023 05:20) (98% - 99%)    Parameters below as of 21 Aug 2023 05:20  Patient On (Oxygen Delivery Method): room air        I&O's Summary      PHYSICAL EXAM:  GENERAL: Laying comfortably, NAD  HEENT: NCAT, PERRLA, EOMI, no scleral icterus, no LAD  NECK: No JVD, supple  LUNG: CTABL; No wheezes, crackles, or rhonchi  HEART: RRR; normal S1/S2; No murmurs, rubs, or gallops  ABDOMEN: +BS, soft, nontender, nondistended, no HSM; No rebound, guarding, or rigidity  EXTREMITIES:  No LE edema b/l, 2+ Peripheral Pulses, No clubbing or cyanosis  NEUROLOGY: AOx3, non-focal, strength 5/5 in all extremities, sensation intact  PSYCH: calm and cooperative  SKIN: No rashes or lesions    LABS:                        8.8    5.17  )-----------( 195      ( 20 Aug 2023 02:40 )             26.9     Auto Eosinophil # x     / Auto Eosinophil % x     / Auto Neutrophil # x     / Auto Neutrophil % x     / BANDS % x        08-20    138  |  103  |  11  ----------------------------<  114<H>  3.8   |  25  |  0.76  08-19    139  |  104  |  12  ----------------------------<  95  4.0   |  28  |  0.54  08-18    141  |  104  |  11  ----------------------------<  104<H>  3.8   |  28  |  0.53    Ca    8.9      20 Aug 2023 02:40  Ca    8.9      19 Aug 2023 06:49  Ca    8.9      18 Aug 2023 07:05    TPro  6.3  /  Alb  3.2<L>  /  TBili  <0.2  /  DBili  x   /  AST  32  /  ALT  21  /  AlkPhos  61  08-20          Urinalysis Basic - ( 20 Aug 2023 02:40 )    Color: x / Appearance: x / SG: x / pH: x  Gluc: 114 mg/dL / Ketone: x  / Bili: x / Urobili: x   Blood: x / Protein: x / Nitrite: x   Leuk Esterase: x / RBC: x / WBC x   Sq Epi: x / Non Sq Epi: x / Bacteria: x          CAPILLARY BLOOD GLUCOSE            RADIOLOGY & ADDITIONAL TESTS:     ***************************************************************  Yuri Savage, PGY1  Internal Medicine   TEAMS Preferred  ***************************************************************    KAILASH FUENTES  43y Female  MRN: [id number]  07-12-23 (40d)    Patient is a 43y old  Female who presents with a chief complaint of Weakness in lower and upper extremities (20 Aug 2023 11:07)      SUBJECTIVE / OVERNIGHT EVENTS:   No acute overnight events.   Pt seen and examined at bedside this AM. Endorses improving vaginal bleeding, no new clots. Numbness/weakness persisting without worsening. Continues to experience muscle spasms at night making it difficult to sleep. Also endorsed metallic taste in AM upon waking up. Eating well. Endorsed BMx1. OOB with assistance. Denies any CP, SOB, N/V, constipation, diarrhea, abdominal pain, dysuria, fever, chills, or headaches.      12 Point ROS negative with the exception of the above    MEDICATIONS  (STANDING):  artificial  tears Solution 1 Drop(s) Both EYES four times a day  ascorbic acid 500 milliGRAM(s) Oral daily  DULoxetine 60 milliGRAM(s) Oral at bedtime  enoxaparin Injectable 40 milliGRAM(s) SubCutaneous every 12 hours  ferrous    sulfate 325 milliGRAM(s) Oral <User Schedule>  lidocaine   4% Patch 1 Patch Transdermal daily  methocarbamol 750 milliGRAM(s) Oral three times a day  nystatin/triamcinolone Ointment 1 Application(s) Topical two times a day  polyethylene glycol 3350 17 Gram(s) Oral daily  pregabalin 200 milliGRAM(s) Oral two times a day    MEDICATIONS  (PRN):  melatonin 3 milliGRAM(s) Oral at bedtime PRN Insomnia      OBJECTIVE:  Vital Signs Last 24 Hrs  T(C): 36.7 (21 Aug 2023 05:20), Max: 36.8 (20 Aug 2023 15:40)  T(F): 98 (21 Aug 2023 05:20), Max: 98.2 (20 Aug 2023 15:40)  HR: 84 (21 Aug 2023 05:20) (83 - 86)  BP: 112/71 (21 Aug 2023 05:20) (111/60 - 115/73)  BP(mean): --  RR: 17 (21 Aug 2023 05:20) (17 - 18)  SpO2: 98% (21 Aug 2023 05:20) (98% - 99%)    Parameters below as of 21 Aug 2023 05:20  Patient On (Oxygen Delivery Method): room air        I&O's Summary      PHYSICAL EXAM:  GENERAL: Laying comfortably, NAD  HEENT: NCAT, PERRLA, EOMI, no scleral icterus, no LAD  NECK: No JVD, supple  LUNG: CTABL; No wheezes, crackles, or rhonchi  HEART: RRR; normal S1/S2; No murmurs, rubs, or gallops  ABDOMEN: +BS, soft, nontender, nondistended, no HSM; No rebound, guarding, or rigidity  EXTREMITIES:  +1 LE edema b/l, No erythema, leg tenderness to palpation bilaterally, 2+    NEUROLOGY: AOx3, non-focal, strength 5/5 in all extremities, sensation intact  PSYCH: calm and cooperative  SKIN: No rashes or lesions    LABS:                        8.8    5.17  )-----------( 195      ( 20 Aug 2023 02:40 )             26.9     Auto Eosinophil # x     / Auto Eosinophil % x     / Auto Neutrophil # x     / Auto Neutrophil % x     / BANDS % x        08-20    138  |  103  |  11  ----------------------------<  114<H>  3.8   |  25  |  0.76  08-19    139  |  104  |  12  ----------------------------<  95  4.0   |  28  |  0.54  08-18    141  |  104  |  11  ----------------------------<  104<H>  3.8   |  28  |  0.53    Ca    8.9      20 Aug 2023 02:40  Ca    8.9      19 Aug 2023 06:49  Ca    8.9      18 Aug 2023 07:05    TPro  6.3  /  Alb  3.2<L>  /  TBili  <0.2  /  DBili  x   /  AST  32  /  ALT  21  /  AlkPhos  61  08-20          Urinalysis Basic - ( 20 Aug 2023 02:40 )    Color: x / Appearance: x / SG: x / pH: x  Gluc: 114 mg/dL / Ketone: x  / Bili: x / Urobili: x   Blood: x / Protein: x / Nitrite: x   Leuk Esterase: x / RBC: x / WBC x   Sq Epi: x / Non Sq Epi: x / Bacteria: x          CAPILLARY BLOOD GLUCOSE            RADIOLOGY & ADDITIONAL TESTS:  US Transvaginal (08.20.23 @ 19:43) >  IMPRESSION:  Endometrium measuring up to 7 mm in thickness, which is within normal   limits in the setting of premenopausal/perimenopausal bleeding. However,   this is considered to be abnormally thickenedin the setting of   postmenopausal bleeding, which would warrant endometrial tissue sampling.    Left ovarian cyst measuring 4.3 cm with a single thin septation.    Nonvisualization of the right ovary.       ***************************************************************  Yuri Savage, PGY1  Internal Medicine   TEAMS Preferred  ***************************************************************    KAILASH FUENTES  43y Female  MRN: [id number]  07-12-23 (40d)    Patient is a 43y old  Female who presents with a chief complaint of Weakness in lower and upper extremities (20 Aug 2023 11:07)      SUBJECTIVE / OVERNIGHT EVENTS:   No acute overnight events.   Pt seen and examined at bedside this AM. Endorses improving vaginal bleeding, no new clots. Numbness/weakness persisting without worsening. Continues to experience muscle spasms at night making it difficult to sleep. Also endorsed metallic taste in AM upon waking up. Eating well. Endorsed BMx1. OOB with assistance. Denies any CP, SOB, N/V, constipation, diarrhea, abdominal pain, dysuria, fever, chills, or headaches.      12 Point ROS negative with the exception of the above    MEDICATIONS  (STANDING):  artificial  tears Solution 1 Drop(s) Both EYES four times a day  ascorbic acid 500 milliGRAM(s) Oral daily  DULoxetine 60 milliGRAM(s) Oral at bedtime  enoxaparin Injectable 40 milliGRAM(s) SubCutaneous every 12 hours  ferrous    sulfate 325 milliGRAM(s) Oral <User Schedule>  lidocaine   4% Patch 1 Patch Transdermal daily  methocarbamol 750 milliGRAM(s) Oral three times a day  nystatin/triamcinolone Ointment 1 Application(s) Topical two times a day  polyethylene glycol 3350 17 Gram(s) Oral daily  pregabalin 200 milliGRAM(s) Oral two times a day    MEDICATIONS  (PRN):  melatonin 3 milliGRAM(s) Oral at bedtime PRN Insomnia      OBJECTIVE:  Vital Signs Last 24 Hrs  T(C): 36.7 (21 Aug 2023 05:20), Max: 36.8 (20 Aug 2023 15:40)  T(F): 98 (21 Aug 2023 05:20), Max: 98.2 (20 Aug 2023 15:40)  HR: 84 (21 Aug 2023 05:20) (83 - 86)  BP: 112/71 (21 Aug 2023 05:20) (111/60 - 115/73)  BP(mean): --  RR: 17 (21 Aug 2023 05:20) (17 - 18)  SpO2: 98% (21 Aug 2023 05:20) (98% - 99%)    Parameters below as of 21 Aug 2023 05:20  Patient On (Oxygen Delivery Method): room air        I&O's Summary      PHYSICAL EXAM:  GENERAL: Laying comfortably, NAD  HEENT: NCAT, PERRLA, EOMI, no scleral icterus, no LAD  LUNG: CTABL; No wheezes, crackles, or rhonchi  HEART: RRR; normal S1/S2; No murmurs, rubs, or gallops  ABDOMEN: +BS, soft, nontender, nondistended, no HSM; No rebound, guarding, or rigidity  EXTREMITIES:  +1 LE edema b/l, No erythema, leg tenderness to palpation bilaterally, no erythema  NEUROLOGY: AOx3, non-focal, strength 5/5 in all extremities, sensation intact  PSYCH: calm and cooperative    LABS:                        8.8    5.17  )-----------( 195      ( 20 Aug 2023 02:40 )             26.9     Auto Eosinophil # x     / Auto Eosinophil % x     / Auto Neutrophil # x     / Auto Neutrophil % x     / BANDS % x        08-20    138  |  103  |  11  ----------------------------<  114<H>  3.8   |  25  |  0.76  08-19    139  |  104  |  12  ----------------------------<  95  4.0   |  28  |  0.54  08-18    141  |  104  |  11  ----------------------------<  104<H>  3.8   |  28  |  0.53    Ca    8.9      20 Aug 2023 02:40  Ca    8.9      19 Aug 2023 06:49  Ca    8.9      18 Aug 2023 07:05    TPro  6.3  /  Alb  3.2<L>  /  TBili  <0.2  /  DBili  x   /  AST  32  /  ALT  21  /  AlkPhos  61  08-20          Urinalysis Basic - ( 20 Aug 2023 02:40 )    Color: x / Appearance: x / SG: x / pH: x  Gluc: 114 mg/dL / Ketone: x  / Bili: x / Urobili: x   Blood: x / Protein: x / Nitrite: x   Leuk Esterase: x / RBC: x / WBC x   Sq Epi: x / Non Sq Epi: x / Bacteria: x          CAPILLARY BLOOD GLUCOSE            RADIOLOGY & ADDITIONAL TESTS:  US Transvaginal (08.20.23 @ 19:43) >  IMPRESSION:  Endometrium measuring up to 7 mm in thickness, which is within normal   limits in the setting of premenopausal/perimenopausal bleeding. However,   this is considered to be abnormally thickenedin the setting of   postmenopausal bleeding, which would warrant endometrial tissue sampling.    Left ovarian cyst measuring 4.3 cm with a single thin septation.    Nonvisualization of the right ovary.

## 2023-08-21 NOTE — PROGRESS NOTE ADULT - PROBLEM SELECTOR PLAN 2
Ascending weakness and paresthesias, beginning in 3/2023  EMG: No electrophysiologic evidence of a motor polyradiculoneuropathy - findings are not consistent with a diagnosis of CIDP  MRI head/cervical/thoracic: Mid cervical degenerative disc disease, C4-C5 broad irregular right central and C5-C6 focal right foraminal disc protrusion (disc herniation) that cause ventral cord deformity. Not consistent with clinical presentation per neurosurgery    Plan:  - LP under sedation performed 7/27; CSF studies have been unremarkable so far  - MRI under sedation completed 7/26/23- unremarkable findings   - Also started on robaxin 750mg TID and motrin 600mg and duloxetine 60mg for additional pain control  - Neurology following; appreciate recs  - EEG, TTE normal, CT chest normal   - fatpad biopsy done 8/1; path results did not show amyloidosis  - CSF oligoclonal bands neg   - transthyretin neg   - BH follow up 8/9; recommend outpatient neuro f/u Ascending weakness and paresthesias, beginning in 3/2023  EMG: No electrophysiologic evidence of a motor polyradiculoneuropathy - findings are not consistent with a diagnosis of CIDP  MRI head/cervical/thoracic: Mid cervical degenerative disc disease, C4-C5 broad irregular right central and C5-C6 focal right foraminal disc protrusion (disc herniation) that cause ventral cord deformity. Not consistent with clinical presentation per neurosurgery  Presentation concerning for conversion disorder given negative workup thus far  Neurology following; appreciate recs    Plan:  - LP under sedation performed 7/27; CSF studies have been unremarkable, CSF oligoclonal bands neg   - c/w robaxin 750mg TID and motrin 600mg and duloxetine 60mg for additional pain control  - EEG, TTE normal, CT chest normal, fatpad biopsy done 8/1; path results did not show amyloidosis, transthyretin neg   -  follow up 8/9; recommend outpatient neuro f/u  - consider psych f/u outpatient   - rec outpatient movement disorder specialist f/u  - pending AR authorization Ascending weakness and paresthesias, beginning in 3/2023  EMG: No electrophysiologic evidence of a motor polyradiculoneuropathy - findings are not consistent with a diagnosis of CIDP  MRI head/cervical/thoracic: Mid cervical degenerative disc disease, C4-C5 broad irregular right central and C5-C6 focal right foraminal disc protrusion (disc herniation) that cause ventral cord deformity. Not consistent with clinical presentation per neurosurgery  Presentation concerning for conversion disorder given negative workup thus far  Neurology following; appreciate recs    Plan:  - LP under sedation performed 7/27; CSF studies have been unremarkable, CSF oligoclonal bands neg   - c/w robaxin 750mg TID and motrin 600mg and duloxetine 60mg for additional pain control  - EEG, TTE normal, CT chest normal, fatpad biopsy done 8/1; path results did not show amyloidosis, transthyretin neg   -  follow up 8/9; recommend outpatient neuro f/u  - consider psych f/u outpatient   - rec outpatient movement disorder specialist f/u  - pending AR authorization, possibly Wednesday placenent

## 2023-08-21 NOTE — PROGRESS NOTE ADULT - PROBLEM SELECTOR PLAN 3
Ophthalmology evaluated pt and noted dry eyes    -continue artificial tears 4-5x daily Ophthalmology evaluated pt and noted dry eyes    -continue artificial tears 4-5x daily  - f/u outpatient ophtho

## 2023-08-21 NOTE — PROGRESS NOTE ADULT - PROBLEM SELECTOR PLAN 1
- pt experiencing heavy menses since 8/17  - she has not had her menses in over a year because she is perimenopausal  - She has been HD Stable   - Hb downtrending 10.9--> 8.8; 8.8 Stable  - Pending TVUS - pt experiencing heavy menses since 8/17  - she has not had her menses in over a year because she is perimenopausal  - She has been HD Stable , bleeding improving  - Hb downtrending 10.9--> 8.8; 8.8 Stable  - TVUS with endometrium measuring up to 7mm in thickness which may be abnormal iso postmenopausal bleeding and warrant endometrial biopsy, L ovarian cyst 4.3cm with single thin septation, R ovary not visualized  - Appreciate Gyn recs    Plan  - consider endometrial biopsy, will f/u Gyn recs  - f/u LH, FSH, prolactin, Testosterone, DHEA

## 2023-08-22 DIAGNOSIS — R79.89 OTHER SPECIFIED ABNORMAL FINDINGS OF BLOOD CHEMISTRY: ICD-10-CM

## 2023-08-22 LAB
ALBUMIN SERPL ELPH-MCNC: 2.9 G/DL — LOW (ref 3.3–5)
ALP SERPL-CCNC: 57 U/L — SIGNIFICANT CHANGE UP (ref 40–120)
ALT FLD-CCNC: 22 U/L — SIGNIFICANT CHANGE UP (ref 4–33)
ANION GAP SERPL CALC-SCNC: 8 MMOL/L — SIGNIFICANT CHANGE UP (ref 7–14)
AST SERPL-CCNC: 30 U/L — SIGNIFICANT CHANGE UP (ref 4–32)
BASOPHILS # BLD AUTO: 0.03 K/UL — SIGNIFICANT CHANGE UP (ref 0–0.2)
BASOPHILS NFR BLD AUTO: 0.6 % — SIGNIFICANT CHANGE UP (ref 0–2)
BILIRUB SERPL-MCNC: <0.2 MG/DL — SIGNIFICANT CHANGE UP (ref 0.2–1.2)
BUN SERPL-MCNC: 10 MG/DL — SIGNIFICANT CHANGE UP (ref 7–23)
CALCIUM SERPL-MCNC: 9 MG/DL — SIGNIFICANT CHANGE UP (ref 8.4–10.5)
CHLORIDE SERPL-SCNC: 103 MMOL/L — SIGNIFICANT CHANGE UP (ref 98–107)
CO2 SERPL-SCNC: 28 MMOL/L — SIGNIFICANT CHANGE UP (ref 22–31)
CREAT SERPL-MCNC: 0.51 MG/DL — SIGNIFICANT CHANGE UP (ref 0.5–1.3)
EGFR: 119 ML/MIN/1.73M2 — SIGNIFICANT CHANGE UP
EOSINOPHIL # BLD AUTO: 0.45 K/UL — SIGNIFICANT CHANGE UP (ref 0–0.5)
EOSINOPHIL NFR BLD AUTO: 8.6 % — HIGH (ref 0–6)
GLUCOSE SERPL-MCNC: 97 MG/DL — SIGNIFICANT CHANGE UP (ref 70–99)
HCT VFR BLD CALC: 25 % — LOW (ref 34.5–45)
HGB BLD-MCNC: 8 G/DL — LOW (ref 11.5–15.5)
IANC: 2.3 K/UL — SIGNIFICANT CHANGE UP (ref 1.8–7.4)
IMM GRANULOCYTES NFR BLD AUTO: 0.2 % — SIGNIFICANT CHANGE UP (ref 0–0.9)
LYMPHOCYTES # BLD AUTO: 2.09 K/UL — SIGNIFICANT CHANGE UP (ref 1–3.3)
LYMPHOCYTES # BLD AUTO: 40 % — SIGNIFICANT CHANGE UP (ref 13–44)
MAGNESIUM SERPL-MCNC: 1.7 MG/DL — SIGNIFICANT CHANGE UP (ref 1.6–2.6)
MCHC RBC-ENTMCNC: 28.4 PG — SIGNIFICANT CHANGE UP (ref 27–34)
MCHC RBC-ENTMCNC: 32 GM/DL — SIGNIFICANT CHANGE UP (ref 32–36)
MCV RBC AUTO: 88.7 FL — SIGNIFICANT CHANGE UP (ref 80–100)
MONOCYTES # BLD AUTO: 0.34 K/UL — SIGNIFICANT CHANGE UP (ref 0–0.9)
MONOCYTES NFR BLD AUTO: 6.5 % — SIGNIFICANT CHANGE UP (ref 2–14)
NEUTROPHILS # BLD AUTO: 2.3 K/UL — SIGNIFICANT CHANGE UP (ref 1.8–7.4)
NEUTROPHILS NFR BLD AUTO: 44.1 % — SIGNIFICANT CHANGE UP (ref 43–77)
NRBC # BLD: 0 /100 WBCS — SIGNIFICANT CHANGE UP (ref 0–0)
NRBC # FLD: 0 K/UL — SIGNIFICANT CHANGE UP (ref 0–0)
PHOSPHATE SERPL-MCNC: 4.5 MG/DL — SIGNIFICANT CHANGE UP (ref 2.5–4.5)
PLATELET # BLD AUTO: 198 K/UL — SIGNIFICANT CHANGE UP (ref 150–400)
POTASSIUM SERPL-MCNC: 3.9 MMOL/L — SIGNIFICANT CHANGE UP (ref 3.5–5.3)
POTASSIUM SERPL-SCNC: 3.9 MMOL/L — SIGNIFICANT CHANGE UP (ref 3.5–5.3)
PROT SERPL-MCNC: 6.2 G/DL — SIGNIFICANT CHANGE UP (ref 6–8.3)
RBC # BLD: 2.82 M/UL — LOW (ref 3.8–5.2)
RBC # FLD: 13.3 % — SIGNIFICANT CHANGE UP (ref 10.3–14.5)
SODIUM SERPL-SCNC: 139 MMOL/L — SIGNIFICANT CHANGE UP (ref 135–145)
WBC # BLD: 5.22 K/UL — SIGNIFICANT CHANGE UP (ref 3.8–10.5)
WBC # FLD AUTO: 5.22 K/UL — SIGNIFICANT CHANGE UP (ref 3.8–10.5)

## 2023-08-22 PROCEDURE — 99232 SBSQ HOSP IP/OBS MODERATE 35: CPT | Mod: GC

## 2023-08-22 RX ORDER — MAGNESIUM OXIDE 400 MG ORAL TABLET 241.3 MG
400 TABLET ORAL ONCE
Refills: 0 | Status: COMPLETED | OUTPATIENT
Start: 2023-08-22 | End: 2023-08-22

## 2023-08-22 RX ADMIN — ENOXAPARIN SODIUM 40 MILLIGRAM(S): 100 INJECTION SUBCUTANEOUS at 21:04

## 2023-08-22 RX ADMIN — Medication 500 MILLIGRAM(S): at 11:18

## 2023-08-22 RX ADMIN — DULOXETINE HYDROCHLORIDE 60 MILLIGRAM(S): 30 CAPSULE, DELAYED RELEASE ORAL at 21:04

## 2023-08-22 RX ADMIN — ENOXAPARIN SODIUM 40 MILLIGRAM(S): 100 INJECTION SUBCUTANEOUS at 11:18

## 2023-08-22 RX ADMIN — Medication 325 MILLIGRAM(S): at 09:23

## 2023-08-22 RX ADMIN — Medication 1 APPLICATION(S): at 17:59

## 2023-08-22 RX ADMIN — Medication 200 MILLIGRAM(S): at 17:57

## 2023-08-22 RX ADMIN — Medication 200 MILLIGRAM(S): at 06:14

## 2023-08-22 RX ADMIN — MAGNESIUM OXIDE 400 MG ORAL TABLET 400 MILLIGRAM(S): 241.3 TABLET ORAL at 09:23

## 2023-08-22 RX ADMIN — METHOCARBAMOL 750 MILLIGRAM(S): 500 TABLET, FILM COATED ORAL at 06:14

## 2023-08-22 RX ADMIN — METHOCARBAMOL 750 MILLIGRAM(S): 500 TABLET, FILM COATED ORAL at 11:18

## 2023-08-22 RX ADMIN — METHOCARBAMOL 750 MILLIGRAM(S): 500 TABLET, FILM COATED ORAL at 21:04

## 2023-08-22 NOTE — CHART NOTE - NSCHARTNOTESELECT_GEN_ALL_CORE
EEG prelim
EEG prelim
Endocrinology/Event Note
Event Note
Event Note
IR Pre-procedure note/Event Note
Neurology
Nutrition Services
Post Op check
leg cramping/Event Note
Event Note
Event Note
IPT Consult/Event Note
IPT Progress/Event Note
IPT Progress/Event Note
IR Pre procedure note/Event Note
Neurology/Event Note
Nutrition Services

## 2023-08-22 NOTE — PROGRESS NOTE ADULT - PROBLEM SELECTOR PLAN 4
- Hypoechoic liver lesions and hepatic steatosis on RUQ US  - Hepatitis panel, HIV (-)  - CT A/P consistent with hepatic steatosis  - AST/ALT (36/26) 8/7/23    Plan  - ensure patient is not on any hepatotoxic meds  - recommend f/u outpatient for MRI abdomen to better visualize liver lesions Ophthalmology evaluated pt and noted dry eyes    -continue artificial tears 4-5x daily  - f/u outpatient ophtho

## 2023-08-22 NOTE — PROGRESS NOTE ADULT - SUBJECTIVE AND OBJECTIVE BOX
***************************************************************  Yuri Savage, PGY1  Internal Medicine   TEAMS Preferred  ***************************************************************    KAILASH FUENTES  43y Female  MRN: [id number]  [birth year] (41d)    Patient is a 43y old  Female who presents with a chief complaint of Weakness in lower and upper extremities (21 Aug 2023 06:51)      SUBJECTIVE / OVERNIGHT EVENTS:   No acute overnight events.   Pt seen and examined at bedside this AM. Denies any CP, SOB, N/V, constipation, diarrhea, abdominal pain, dysuria, fever, chills, or headaches. Last BM     12 Point ROS negative with the exception of the above    MEDICATIONS  (STANDING):  artificial  tears Solution 1 Drop(s) Both EYES four times a day  ascorbic acid 500 milliGRAM(s) Oral daily  DULoxetine 60 milliGRAM(s) Oral at bedtime  enoxaparin Injectable 40 milliGRAM(s) SubCutaneous every 12 hours  ferrous    sulfate 325 milliGRAM(s) Oral <User Schedule>  lidocaine   4% Patch 1 Patch Transdermal daily  methocarbamol 750 milliGRAM(s) Oral three times a day  nystatin/triamcinolone Ointment 1 Application(s) Topical two times a day  polyethylene glycol 3350 17 Gram(s) Oral daily  pregabalin 200 milliGRAM(s) Oral two times a day    MEDICATIONS  (PRN):  melatonin 3 milliGRAM(s) Oral at bedtime PRN Insomnia      OBJECTIVE:  Vital Signs Last 24 Hrs  T(C): 36.8 (22 Aug 2023 05:44), Max: 36.8 (21 Aug 2023 14:51)  T(F): 98.3 (22 Aug 2023 05:44), Max: 98.3 (21 Aug 2023 14:51)  HR: 84 (22 Aug 2023 05:44) (84 - 106)  BP: 124/68 (22 Aug 2023 05:44) (120/80 - 128/83)  BP(mean): --  RR: 16 (22 Aug 2023 05:44) (16 - 18)  SpO2: 100% (22 Aug 2023 05:44) (100% - 100%)    Parameters below as of 22 Aug 2023 05:44  Patient On (Oxygen Delivery Method): room air        I&O's Summary      PHYSICAL EXAM:  GENERAL: Laying comfortably, NAD  HEENT: NCAT, PERRLA, EOMI, no scleral icterus, no LAD  NECK: No JVD, supple  LUNG: CTABL; No wheezes, crackles, or rhonchi  HEART: RRR; normal S1/S2; No murmurs, rubs, or gallops  ABDOMEN: +BS, soft, nontender, nondistended, no HSM; No rebound, guarding, or rigidity  EXTREMITIES:  No LE edema b/l, 2+ Peripheral Pulses, No clubbing or cyanosis  NEUROLOGY: AOx3, non-focal, strength 5/5 in all extremities, sensation intact  PSYCH: calm and cooperative  SKIN: No rashes or lesions    LABS:    08-20    138  |  103  |  11  ----------------------------<  114<H>  3.8   |  25  |  0.76    Ca    8.9      20 Aug 2023 02:40    TPro  6.3  /  Alb  3.2<L>  /  TBili  <0.2  /  DBili  x   /  AST  32  /  ALT  21  /  AlkPhos  61  08-20                CAPILLARY BLOOD GLUCOSE            RADIOLOGY & ADDITIONAL TESTS:     ***************************************************************  Yuri Savage, PGY1  Internal Medicine   TEAMS Preferred  ***************************************************************    KAILASH FUENTES  43y Female  MRN: [id number]  [birth year] (41d)    Patient is a 43y old  Female who presents with a chief complaint of Weakness in lower and upper extremities (21 Aug 2023 06:51)      SUBJECTIVE / OVERNIGHT EVENTS:   No acute overnight events.   Pt seen and examined at bedside this AM. Slept well, vaginal bleeding continues to improve. Endorses medial left knee pain and ongoing cramps and paresthesias. BMx1. Denies any CP, SOB, N/V, constipation, diarrhea, abdominal pain, dysuria, fever, chills, or headaches.    12 Point ROS negative with the exception of the above    MEDICATIONS  (STANDING):  artificial  tears Solution 1 Drop(s) Both EYES four times a day  ascorbic acid 500 milliGRAM(s) Oral daily  DULoxetine 60 milliGRAM(s) Oral at bedtime  enoxaparin Injectable 40 milliGRAM(s) SubCutaneous every 12 hours  ferrous    sulfate 325 milliGRAM(s) Oral <User Schedule>  lidocaine   4% Patch 1 Patch Transdermal daily  methocarbamol 750 milliGRAM(s) Oral three times a day  nystatin/triamcinolone Ointment 1 Application(s) Topical two times a day  polyethylene glycol 3350 17 Gram(s) Oral daily  pregabalin 200 milliGRAM(s) Oral two times a day    MEDICATIONS  (PRN):  melatonin 3 milliGRAM(s) Oral at bedtime PRN Insomnia      OBJECTIVE:  Vital Signs Last 24 Hrs  T(C): 36.8 (22 Aug 2023 05:44), Max: 36.8 (21 Aug 2023 14:51)  T(F): 98.3 (22 Aug 2023 05:44), Max: 98.3 (21 Aug 2023 14:51)  HR: 84 (22 Aug 2023 05:44) (84 - 106)  BP: 124/68 (22 Aug 2023 05:44) (120/80 - 128/83)  BP(mean): --  RR: 16 (22 Aug 2023 05:44) (16 - 18)  SpO2: 100% (22 Aug 2023 05:44) (100% - 100%)    Parameters below as of 22 Aug 2023 05:44  Patient On (Oxygen Delivery Method): room air        I&O's Summary      PHYSICAL EXAM:  GENERAL: Laying comfortably, NAD  HEENT: NCAT, PERRLA, EOMI, no scleral icterus, no LAD  NECK: No JVD, supple  LUNG: CTABL; No wheezes, crackles, or rhonchi  HEART: RRR; normal S1/S2; No murmurs, rubs, or gallops  ABDOMEN: +BS, soft, nontender, nondistended, no HSM; No rebound, guarding, or rigidity  EXTREMITIES:  trace LE edema b/l, 2+ Peripheral Pulses, No clubbing or cyanosis  NEUROLOGY: AOx3, non-focal, strength 5/5 in all extremities, sensation intact  MSK: Left knee medial tenderness to palpation, no echymosis, erythema, normal ROM  PSYCH: calm and cooperative  SKIN: No rashes or lesions    LABS:    08-20    138  |  103  |  11  ----------------------------<  114<H>  3.8   |  25  |  0.76    Ca    8.9      20 Aug 2023 02:40    TPro  6.3  /  Alb  3.2<L>  /  TBili  <0.2  /  DBili  x   /  AST  32  /  ALT  21  /  AlkPhos  61  08-20                CAPILLARY BLOOD GLUCOSE            RADIOLOGY & ADDITIONAL TESTS:
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
## 2023-08-22 NOTE — CHART NOTE - NSCHARTNOTEFT_GEN_A_CORE
Received consult for elevated prolactin 93. MRI head w/wo IVC (not pituitary protocol) noted: no pituitary masses noted. No need for inpatient workup. Will coordinate outpatient endocrine followup. Defer inpatient consult for now.    Discussed w/ attending Dr. John Auguste MD  Endocrine Fellow  For nonurgent matters, please email [email or web address] or [email or web address]. For urgent follow up questions, discharge recommendations, or new consults please call answering service at [phone] (weekdays), [phone] (nights/weekends). Received consult for elevated prolactin 93. MRI head w/wo IVC (not pituitary protocol) noted: no pituitary masses noted. No need for inpatient workup. Will coordinate outpatient endocrine followup. Defer inpatient consult for now.    Discussed w/ attending Dr. Lopez    UPDATE: unfortunately [employer] is not accepted by [facility]. Patient will need to contact her own PCP for a referral.    Troy Auguste MD  Endocrine Fellow  For nonurgent matters, please email [email or web address] or [email or web address]. For urgent follow up questions, discharge recommendations, or new consults please call answering service at [phone] (weekdays), [phone] (nights/weekends). Received consult for elevated prolactin 93. MRI head w/wo IVC (not pituitary protocol) noted: no pituitary masses noted. No need for inpatient workup. Will coordinate outpatient endocrine followup. Defer inpatient consult for now.    Discussed w/ attending Dr. Lopez    UPDATE: unfortunately [employer] is not accepted by [facility]. Patient will need to contact her own PCP for a referral within network.    Troy Auguste MD  Endocrine Fellow  For nonurgent matters, please email [email or web address] or [email or web address]. For urgent follow up questions, discharge recommendations, or new consults please call answering service at [phone] (weekdays), [phone] (nights/weekends).

## 2023-08-22 NOTE — PROGRESS NOTE ADULT - PROBLEM SELECTOR PLAN 2
Ascending weakness and paresthesias, beginning in 3/2023  EMG: No electrophysiologic evidence of a motor polyradiculoneuropathy - findings are not consistent with a diagnosis of CIDP  MRI head/cervical/thoracic: Mid cervical degenerative disc disease, C4-C5 broad irregular right central and C5-C6 focal right foraminal disc protrusion (disc herniation) that cause ventral cord deformity. Not consistent with clinical presentation per neurosurgery  Presentation concerning for conversion disorder given negative workup thus far  Neurology following; appreciate recs    Plan:  - LP under sedation performed 7/27; CSF studies have been unremarkable, CSF oligoclonal bands neg   - c/w robaxin 750mg TID and motrin 600mg and duloxetine 60mg for additional pain control  - EEG, TTE normal, CT chest normal, fatpad biopsy done 8/1; path results did not show amyloidosis, transthyretin neg   -  follow up 8/9; recommend outpatient neuro f/u  - consider psych f/u outpatient   - rec outpatient movement disorder specialist f/u  - pending AR authorization, possibly Wednesday placenent - pt experiencing heavy menses since 8/17  - she has not had her menses in over a year because she is perimenopausal  - She has been HD Stable , bleeding improving  - Hb downtrending 10.9--> 8.8; 8.8 Stable  - TVUS with endometrium measuring up to 7mm in thickness which may be abnormal iso postmenopausal bleeding and warrant endometrial biopsy, L ovarian cyst 4.3cm with single thin septation, R ovary not visualized  - Appreciate Gyn recs  - LH, FSH, Testosterone, DHEA wnl, prolactin elevated    Plan  - consider endometrial biopsy  - f/u Gyn recs
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
## 2023-08-22 NOTE — PROGRESS NOTE ADULT - PROBLEM SELECTOR PLAN 5
Unlikely ACS, EKG NSR, trop (-)  Endorses mid epigastric burning pain  Also MSK in nature as pt has been using her upper body to compensate for LE weakness  Improved with rest since admission Prolactin 93.8 and patient with hx of amenorrhea  MRI brain without evidence of pituitary lesion  Differential includes microadenoma vs drug induced vs ...    Plan  - f/u GYN recs - Hypoechoic liver lesions and hepatic steatosis on RUQ US  - Hepatitis panel, HIV (-)  - CT A/P consistent with hepatic steatosis  - AST/ALT (36/26) 8/7/23    Plan  - ensure patient is not on any hepatotoxic meds  - recommend f/u outpatient for MRI abdomen to better visualize liver lesions

## 2023-08-22 NOTE — PROGRESS NOTE ADULT - PROBLEM SELECTOR PLAN 1
- pt experiencing heavy menses since 8/17  - she has not had her menses in over a year because she is perimenopausal  - She has been HD Stable , bleeding improving  - Hb downtrending 10.9--> 8.8; 8.8 Stable  - TVUS with endometrium measuring up to 7mm in thickness which may be abnormal iso postmenopausal bleeding and warrant endometrial biopsy, L ovarian cyst 4.3cm with single thin septation, R ovary not visualized  - Appreciate Gyn recs    Plan  - consider endometrial biopsy, will f/u Gyn recs  - f/u LH, FSH, prolactin, Testosterone, DHEA - pt experiencing heavy menses since 8/17  - she has not had her menses in over a year because she is perimenopausal  - She has been HD Stable , bleeding improving  - Hb downtrending 10.9--> 8.8; 8.8 Stable  - TVUS with endometrium measuring up to 7mm in thickness which may be abnormal iso postmenopausal bleeding and warrant endometrial biopsy, L ovarian cyst 4.3cm with single thin septation, R ovary not visualized  - Appreciate Gyn recs  - LH, FSH, Testosterone, DHEA wnl, prolactin elevated  Plan  - consider endometrial biopsy, will f/u Gyn recs Prolactin 93.8 and patient with hx of amenorrhea  MRI brain without evidence of pituitary lesion or abnormal enhancement  Differential includes microadenoma vs drug induced vs pituitary disease    Plan  - f/u GYN recs  - will consult Endo

## 2023-08-22 NOTE — PROGRESS NOTE ADULT - PROBLEM SELECTOR PLAN 3
Ophthalmology evaluated pt and noted dry eyes    -continue artificial tears 4-5x daily  - f/u outpatient ophtho Ascending weakness and paresthesias, beginning in 3/2023  EMG: No electrophysiologic evidence of a motor polyradiculoneuropathy - findings are not consistent with a diagnosis of CIDP  MRI head/cervical/thoracic: Mid cervical degenerative disc disease, C4-C5 broad irregular right central and C5-C6 focal right foraminal disc protrusion (disc herniation) that cause ventral cord deformity. Not consistent with clinical presentation per neurosurgery  Presentation concerning for conversion disorder given negative workup thus far  Neurology following; appreciate recs    Plan:  - LP under sedation performed 7/27; CSF studies have been unremarkable, CSF oligoclonal bands neg   - c/w robaxin 750mg TID and motrin 600mg and duloxetine 60mg for additional pain control  - EEG, TTE normal, CT chest normal, fatpad biopsy done 8/1; path results did not show amyloidosis, transthyretin neg   -  follow up 8/9; recommend outpatient neuro f/u  - consider psych f/u outpatient   - rec outpatient movement disorder specialist f/u  - pending AR authorization, possibly Wednesday placement

## 2023-08-22 NOTE — PROGRESS NOTE ADULT - ASSESSMENT
Ms Perez is a 42yo woman with a hx of peripheral neuropathy, sickle cell trait, asthma, and anemia who was admitted after suffering a fall with inability to get up due to 4 months of ongoing bilateral lower and upper extremity weakness and paresthesias. Homocysteine slightly elevated however rest of lab workup unremarkable so far. EMG not consistent with CIDP. MRI head, C/T spine completed 7/21 showing C4-C6 disc protrusions causing ventral cord deformity, not consistent with clinical presentation. LP and MRI spine also done under anesthesia with unremarkable findings. With episode of heavy menses 8/17 after 1 year without menses; with persistent bleeding, but decreased amount, TVUS with 7mm thickness of endometrium warranting possible endometrial biopsy

## 2023-08-23 PROCEDURE — 99232 SBSQ HOSP IP/OBS MODERATE 35: CPT | Mod: GC

## 2023-08-23 RX ADMIN — Medication 200 MILLIGRAM(S): at 06:01

## 2023-08-23 RX ADMIN — Medication 1 DROP(S): at 19:05

## 2023-08-23 RX ADMIN — Medication 1 DROP(S): at 13:10

## 2023-08-23 RX ADMIN — Medication 1 APPLICATION(S): at 19:07

## 2023-08-23 RX ADMIN — METHOCARBAMOL 750 MILLIGRAM(S): 500 TABLET, FILM COATED ORAL at 22:29

## 2023-08-23 RX ADMIN — METHOCARBAMOL 750 MILLIGRAM(S): 500 TABLET, FILM COATED ORAL at 13:09

## 2023-08-23 RX ADMIN — ENOXAPARIN SODIUM 40 MILLIGRAM(S): 100 INJECTION SUBCUTANEOUS at 13:10

## 2023-08-23 RX ADMIN — DULOXETINE HYDROCHLORIDE 60 MILLIGRAM(S): 30 CAPSULE, DELAYED RELEASE ORAL at 22:29

## 2023-08-23 RX ADMIN — Medication 200 MILLIGRAM(S): at 19:04

## 2023-08-23 RX ADMIN — Medication 500 MILLIGRAM(S): at 13:09

## 2023-08-23 RX ADMIN — ENOXAPARIN SODIUM 40 MILLIGRAM(S): 100 INJECTION SUBCUTANEOUS at 22:29

## 2023-08-23 RX ADMIN — METHOCARBAMOL 750 MILLIGRAM(S): 500 TABLET, FILM COATED ORAL at 06:01

## 2023-08-23 NOTE — PROGRESS NOTE ADULT - PROBLEM SELECTOR PLAN 4
Ophthalmology evaluated pt and noted dry eyes    -continue artificial tears 4-5x daily  - f/u outpatient ophtho

## 2023-08-23 NOTE — PROGRESS NOTE ADULT - ASSESSMENT
Ms Perez is a 42yo woman with a hx of peripheral neuropathy, sickle cell trait, asthma, and anemia who was admitted after suffering a fall with inability to get up due to 4 months of ongoing bilateral lower and upper extremity weakness and paresthesias. Homocysteine slightly elevated however rest of lab workup unremarkable so far. EMG not consistent with CIDP. MRI head, C/T spine completed 7/21 showing C4-C6 disc protrusions causing ventral cord deformity, not consistent with clinical presentation. LP and MRI spine also done under anesthesia with unremarkable findings. With episode of heavy menses 8/17 after 1 year without menses; with persistent bleeding, but decreased amount, TVUS with 7mm thickness of endometrium warranting possible endometrial biopsy. Found to have elevated prolactin.

## 2023-08-23 NOTE — PROGRESS NOTE ADULT - PROBLEM SELECTOR PLAN 5
- Hypoechoic liver lesions and hepatic steatosis on RUQ US  - Hepatitis panel, HIV (-)  - CT A/P consistent with hepatic steatosis  - AST/ALT (36/26) 8/7/23    Plan  - ensure patient is not on any hepatotoxic meds  - recommend f/u outpatient for MRI abdomen to better visualize liver lesions

## 2023-08-23 NOTE — PROGRESS NOTE ADULT - PROBLEM SELECTOR PLAN 3
Ascending weakness and paresthesias, beginning in 3/2023  EMG: No electrophysiologic evidence of a motor polyradiculoneuropathy - findings are not consistent with a diagnosis of CIDP  MRI head/cervical/thoracic: Mid cervical degenerative disc disease, C4-C5 broad irregular right central and C5-C6 focal right foraminal disc protrusion (disc herniation) that cause ventral cord deformity. Not consistent with clinical presentation per neurosurgery  Presentation concerning for conversion disorder given negative workup thus far  Neurology following; appreciate recs    Plan:  - LP under sedation performed 7/27; CSF studies have been unremarkable, CSF oligoclonal bands neg   - c/w robaxin 750mg TID and motrin 600mg and duloxetine 60mg for additional pain control  - EEG, TTE normal, CT chest normal, fatpad biopsy done 8/1; path results did not show amyloidosis, transthyretin neg   -  follow up 8/9; recommend outpatient neuro f/u  - consider psych f/u outpatient   - rec outpatient movement disorder specialist f/u  - pending AR authorization, possibly Wednesday placement

## 2023-08-23 NOTE — PROGRESS NOTE ADULT - PROBLEM SELECTOR PLAN 1
Prolactin 93.8 and patient with hx of amenorrhea  MRI brain without evidence of pituitary lesion or abnormal enhancement  Differential includes microadenoma vs drug induced vs pituitary disease vs autoimmune disease  Patient does not take antipsychotics or other drugs recently that are known to elevate prolactin. TSH and ACE wnl    Plan  - f/u GYN recs  - per Endo hyperprolactinemia workup may be done on an outpatient basis Prolactin 93.8 and patient with hx of amenorrhea  MRI brain without evidence of pituitary lesion or abnormal enhancement  Differential includes microadenoma vs drug induced vs pituitary disease vs autoimmune disease  Patient does not take antipsychotics or other drugs recently that are known to elevate prolactin. TSH and ACE wnl    Plan  -   - per Endo hyperprolactinemia workup may be done on an outpatient basis Prolactin 93.8 and patient with hx of amenorrhea  MRI brain without evidence of pituitary lesion or abnormal enhancement  Differential includes microadenoma vs drug induced vs pituitary disease vs autoimmune disease  Patient does not take antipsychotics or other drugs recently that are known to elevate prolactin. TSH and ACE wnl    Plan  - per Endo hyperprolactinemia workup may be done on an outpatient basis

## 2023-08-23 NOTE — PROGRESS NOTE ADULT - SUBJECTIVE AND OBJECTIVE BOX
***************************************************************  Yuri Savage, PGY1  Internal Medicine   TE[employer] Preferred  ***************************************************************    KAILASH FUENTES  43y Female  MRN: [id number]  [birth year] (42d)    Patient is a 43y old  Female who presents with a chief complaint of Weakness in lower and upper extremities (22 Aug 2023 07:09)      SUBJECTIVE / OVERNIGHT EVENTS:   No acute overnight events.   Pt seen and examined at bedside this AM. Denies any CP, SOB, N/V, constipation, diarrhea, abdominal pain, dysuria, fever, chills, or headaches. Last BM     12 Point ROS negative with the exception of the above    MEDICATIONS  (STANDING):  artificial  tears Solution 1 Drop(s) Both EYES four times a day  ascorbic acid 500 milliGRAM(s) Oral daily  DULoxetine 60 milliGRAM(s) Oral at bedtime  enoxaparin Injectable 40 milliGRAM(s) SubCutaneous every 12 hours  ferrous    sulfate 325 milliGRAM(s) Oral <User Schedule>  lidocaine   4% Patch 1 Patch Transdermal daily  methocarbamol 750 milliGRAM(s) Oral three times a day  nystatin/triamcinolone Ointment 1 Application(s) Topical two times a day  polyethylene glycol 3350 17 Gram(s) Oral daily  pregabalin 200 milliGRAM(s) Oral two times a day    MEDICATIONS  (PRN):  melatonin 3 milliGRAM(s) Oral at bedtime PRN Insomnia      OBJECTIVE:  Vital Signs Last 24 Hrs  T(C): 36.6 (23 Aug 2023 05:38), Max: 36.9 (22 Aug 2023 21:36)  T(F): 97.8 (23 Aug 2023 05:38), Max: 98.4 (22 Aug 2023 21:36)  HR: 95 (23 Aug 2023 05:38) (94 - 97)  BP: 120/80 (23 Aug 2023 05:38) (108/70 - 120/80)  BP(mean): --  RR: 17 (23 Aug 2023 05:38) (17 - 18)  SpO2: 100% (23 Aug 2023 05:38) (98% - 100%)    Parameters below as of 23 Aug 2023 05:38  Patient On (Oxygen Delivery Method): room air        I&O's Summary      PHYSICAL EXAM:  GENERAL: Laying comfortably, NAD  HEENT: NCAT, PERRLA, EOMI, no scleral icterus, no LAD  NECK: No JVD, supple  LUNG: CTABL; No wheezes, crackles, or rhonchi  HEART: RRR; normal S1/S2; No murmurs, rubs, or gallops  ABDOMEN: +BS, soft, nontender, nondistended, no HSM; No rebound, guarding, or rigidity  EXTREMITIES:  No LE edema b/l, 2+ Peripheral Pulses, No clubbing or cyanosis  NEUROLOGY: AOx3, non-focal, strength 5/5 in all extremities, sensation intact  PSYCH: calm and cooperative  SKIN: No rashes or lesions    LABS:                        8.0    5.22  )-----------( 198      ( 22 Aug 2023 06:44 )             25.0     Auto Eosinophil # 0.45  / Auto Eosinophil % 8.6   / Auto Neutrophil # 2.30  / Auto Neutrophil % 44.1  / BANDS % x        08-22    139  |  103  |  10  ----------------------------<  97  3.9   |  28  |  0.51    Ca    9.0      22 Aug 2023 06:44  Phos  4.5     08-22  Mg     1.70     08-22    TPro  6.2  /  Alb  2.9<L>  /  TBili  <0.2  /  DBili  x   /  AST  30  /  ALT  22  /  AlkPhos  57  08-22          Urinalysis Basic - ( 22 Aug 2023 06:44 )    Color: x / Appearance: x / SG: x / pH: x  Gluc: 97 mg/dL / Ketone: x  / Bili: x / Urobili: x   Blood: x / Protein: x / Nitrite: x   Leuk Esterase: x / RBC: x / WBC x   Sq Epi: x / Non Sq Epi: x / Bacteria: x          CAPILLARY BLOOD GLUCOSE            RADIOLOGY & ADDITIONAL TESTS:     ***************************************************************  Yuri Savage, PGY1  Internal Medicine   [employer]AMS Preferred  ***************************************************************    KAILASH FUENTES  43y Female  MRN: [id number]  [birth year] (42d)    Patient is a 43y old  Female who presents with a chief complaint of Weakness in lower and upper extremities (22 Aug 2023 07:09)      SUBJECTIVE / OVERNIGHT EVENTS:   No acute overnight events.   Pt seen and examined at bedside this AM. Continues to endorse cramping. Overnight, cramping more severe than before, felt as though entire body was "squeezing" and had some SOB. Denies any FH of amenorrhea or pituitary disorders. BMx1. Tolerating diet. Denies any CP, N/V, constipation, diarrhea, abdominal pain, dysuria, fever, chills, or headaches.     12 Point ROS negative with the exception of the above    MEDICATIONS  (STANDING):  artificial  tears Solution 1 Drop(s) Both EYES four times a day  ascorbic acid 500 milliGRAM(s) Oral daily  DULoxetine 60 milliGRAM(s) Oral at bedtime  enoxaparin Injectable 40 milliGRAM(s) SubCutaneous every 12 hours  ferrous    sulfate 325 milliGRAM(s) Oral <User Schedule>  lidocaine   4% Patch 1 Patch Transdermal daily  methocarbamol 750 milliGRAM(s) Oral three times a day  nystatin/triamcinolone Ointment 1 Application(s) Topical two times a day  polyethylene glycol 3350 17 Gram(s) Oral daily  pregabalin 200 milliGRAM(s) Oral two times a day    MEDICATIONS  (PRN):  melatonin 3 milliGRAM(s) Oral at bedtime PRN Insomnia      OBJECTIVE:  Vital Signs Last 24 Hrs  T(C): 36.6 (23 Aug 2023 05:38), Max: 36.9 (22 Aug 2023 21:36)  T(F): 97.8 (23 Aug 2023 05:38), Max: 98.4 (22 Aug 2023 21:36)  HR: 95 (23 Aug 2023 05:38) (94 - 97)  BP: 120/80 (23 Aug 2023 05:38) (108/70 - 120/80)  BP(mean): --  RR: 17 (23 Aug 2023 05:38) (17 - 18)  SpO2: 100% (23 Aug 2023 05:38) (98% - 100%)    Parameters below as of 23 Aug 2023 05:38  Patient On (Oxygen Delivery Method): room air        I&O's Summary      PHYSICAL EXAM:  GENERAL: Witnessed cramping episode involving entire body, patient able to speak throughout episode although in pain  HEENT: NCAT, PERRLA, EOMI, no scleral icterus, no LAD  NECK: No JVD, supple  LUNG: CTABL; No wheezes, crackles, or rhonchi  HEART: RRR; normal S1/S2; No murmurs, rubs, or gallops  ABDOMEN: +BS, soft, nontender, nondistended, no HSM; No rebound, guarding, or rigidity  EXTREMITIES:  +1 LE edema b/l, 2+ Peripheral Pulses, No clubbing or cyanosis  NEUROLOGY: AOx3, non-focal, strength 5/5 in all extremities, sensation intact  MSK: Medial L knee pain improved   PSYCH: calm and cooperative  SKIN: No rashes or lesions    LABS:                        8.0    5.22  )-----------( 198      ( 22 Aug 2023 06:44 )             25.0     Auto Eosinophil # 0.45  / Auto Eosinophil % 8.6   / Auto Neutrophil # 2.30  / Auto Neutrophil % 44.1  / BANDS % x        08-22    139  |  103  |  10  ----------------------------<  97  3.9   |  28  |  0.51    Ca    9.0      22 Aug 2023 06:44  Phos  4.5     08-22  Mg     1.70     08-22    TPro  6.2  /  Alb  2.9<L>  /  TBili  <0.2  /  DBili  x   /  AST  30  /  ALT  22  /  AlkPhos  57  08-22          Urinalysis Basic - ( 22 Aug 2023 06:44 )    Color: x / Appearance: x / SG: x / pH: x  Gluc: 97 mg/dL / Ketone: x  / Bili: x / Urobili: x   Blood: x / Protein: x / Nitrite: x   Leuk Esterase: x / RBC: x / WBC x   Sq Epi: x / Non Sq Epi: x / Bacteria: x          CAPILLARY BLOOD GLUCOSE            RADIOLOGY & ADDITIONAL TESTS:

## 2023-08-23 NOTE — PROGRESS NOTE ADULT - PROBLEM SELECTOR PLAN 2
- pt experiencing heavy menses since 8/17  - she has not had her menses in over a year because she is perimenopausal  - She has been HD Stable , bleeding improving  - Hb downtrending 10.9--> 8.8; 8.8 Stable  - TVUS with endometrium measuring up to 7mm in thickness which may be abnormal iso postmenopausal bleeding and warrant endometrial biopsy, L ovarian cyst 4.3cm with single thin septation, R ovary not visualized  - Appreciate Gyn recs  - LH, FSH, Testosterone, DHEA wnl, prolactin elevated    Plan  - consider endometrial biopsy    - f/u Gyn recs - pt experiencing heavy menses since 8/17  - she has not had her menses in over a year because she is perimenopausal  - She has been HD Stable , bleeding improving  - Hb downtrending 10.9--> 8.8; 8.8 Stable  - TVUS with endometrium measuring up to 7mm in thickness which may be abnormal iso postmenopausal bleeding and warrant endometrial biopsy, L ovarian cyst 4.3cm with single thin septation, R ovary not visualized  - LH, FSH, Testosterone, DHEA wnl, prolactin elevated  - Appreciate Gyn recs: felt unlikely to be postmenopausal bleeding, will require outpatient workup      Plan  - consider endometrial biopsy in outpatient setting

## 2023-08-24 LAB
ANION GAP SERPL CALC-SCNC: 9 MMOL/L — SIGNIFICANT CHANGE UP (ref 7–14)
BUN SERPL-MCNC: 8 MG/DL — SIGNIFICANT CHANGE UP (ref 7–23)
CALCIUM SERPL-MCNC: 8.8 MG/DL — SIGNIFICANT CHANGE UP (ref 8.4–10.5)
CHLORIDE SERPL-SCNC: 103 MMOL/L — SIGNIFICANT CHANGE UP (ref 98–107)
CO2 SERPL-SCNC: 27 MMOL/L — SIGNIFICANT CHANGE UP (ref 22–31)
CREAT SERPL-MCNC: 0.63 MG/DL — SIGNIFICANT CHANGE UP (ref 0.5–1.3)
DHEA SERPL-MCNC: 122 NG/DL — SIGNIFICANT CHANGE UP (ref 31–701)
EGFR: 113 ML/MIN/1.73M2 — SIGNIFICANT CHANGE UP
GLUCOSE SERPL-MCNC: 96 MG/DL — SIGNIFICANT CHANGE UP (ref 70–99)
HCT VFR BLD CALC: 24.6 % — LOW (ref 34.5–45)
HGB BLD-MCNC: 7.9 G/DL — LOW (ref 11.5–15.5)
MAGNESIUM SERPL-MCNC: 1.7 MG/DL — SIGNIFICANT CHANGE UP (ref 1.6–2.6)
MCHC RBC-ENTMCNC: 28.3 PG — SIGNIFICANT CHANGE UP (ref 27–34)
MCHC RBC-ENTMCNC: 32.1 GM/DL — SIGNIFICANT CHANGE UP (ref 32–36)
MCV RBC AUTO: 88.2 FL — SIGNIFICANT CHANGE UP (ref 80–100)
NRBC # BLD: 0 /100 WBCS — SIGNIFICANT CHANGE UP (ref 0–0)
NRBC # FLD: 0 K/UL — SIGNIFICANT CHANGE UP (ref 0–0)
PHOSPHATE SERPL-MCNC: 4.1 MG/DL — SIGNIFICANT CHANGE UP (ref 2.5–4.5)
PLATELET # BLD AUTO: 213 K/UL — SIGNIFICANT CHANGE UP (ref 150–400)
POTASSIUM SERPL-MCNC: 3.7 MMOL/L — SIGNIFICANT CHANGE UP (ref 3.5–5.3)
POTASSIUM SERPL-SCNC: 3.7 MMOL/L — SIGNIFICANT CHANGE UP (ref 3.5–5.3)
RBC # BLD: 2.79 M/UL — LOW (ref 3.8–5.2)
RBC # FLD: 13.2 % — SIGNIFICANT CHANGE UP (ref 10.3–14.5)
SODIUM SERPL-SCNC: 139 MMOL/L — SIGNIFICANT CHANGE UP (ref 135–145)
WBC # BLD: 4.87 K/UL — SIGNIFICANT CHANGE UP (ref 3.8–10.5)
WBC # FLD AUTO: 4.87 K/UL — SIGNIFICANT CHANGE UP (ref 3.8–10.5)

## 2023-08-24 PROCEDURE — 99232 SBSQ HOSP IP/OBS MODERATE 35: CPT | Mod: GC

## 2023-08-24 RX ADMIN — Medication 500 MILLIGRAM(S): at 12:38

## 2023-08-24 RX ADMIN — Medication 1 APPLICATION(S): at 17:34

## 2023-08-24 RX ADMIN — Medication 200 MILLIGRAM(S): at 05:58

## 2023-08-24 RX ADMIN — METHOCARBAMOL 750 MILLIGRAM(S): 500 TABLET, FILM COATED ORAL at 14:22

## 2023-08-24 RX ADMIN — Medication 325 MILLIGRAM(S): at 12:39

## 2023-08-24 RX ADMIN — Medication 200 MILLIGRAM(S): at 17:11

## 2023-08-24 RX ADMIN — ENOXAPARIN SODIUM 40 MILLIGRAM(S): 100 INJECTION SUBCUTANEOUS at 22:17

## 2023-08-24 RX ADMIN — Medication 1 APPLICATION(S): at 05:57

## 2023-08-24 RX ADMIN — DULOXETINE HYDROCHLORIDE 60 MILLIGRAM(S): 30 CAPSULE, DELAYED RELEASE ORAL at 22:14

## 2023-08-24 RX ADMIN — ENOXAPARIN SODIUM 40 MILLIGRAM(S): 100 INJECTION SUBCUTANEOUS at 12:39

## 2023-08-24 RX ADMIN — METHOCARBAMOL 750 MILLIGRAM(S): 500 TABLET, FILM COATED ORAL at 05:57

## 2023-08-24 RX ADMIN — METHOCARBAMOL 750 MILLIGRAM(S): 500 TABLET, FILM COATED ORAL at 22:14

## 2023-08-24 NOTE — PROGRESS NOTE ADULT - PROBLEM SELECTOR PLAN 1
Prolactin 93.8 and patient with hx of amenorrhea  MRI brain without evidence of pituitary lesion or abnormal enhancement  Differential includes microadenoma vs drug induced vs pituitary disease vs autoimmune disease  Patient does not take antipsychotics or other drugs recently that are known to elevate prolactin. TSH and ACE wnl    Plan  - per Endo hyperprolactinemia workup may be done on an outpatient basis

## 2023-08-24 NOTE — PROGRESS NOTE ADULT - SUBJECTIVE AND OBJECTIVE BOX
***************************************************************  Yuri Savage, PGY1  Internal Medicine   TEAMS Preferred  ***************************************************************    KAILASH FUENTES  43y Female  MRN: [id number]  [birth year] (43d)    Patient is a 43y old  Female who presents with a chief complaint of Weakness in lower and upper extremities (23 Aug 2023 06:49)      SUBJECTIVE / OVERNIGHT EVENTS:   No acute overnight events.   Pt seen and examined at bedside this AM. Continued to have ongoing cramping overnight with one big episode which subsided shortly after. Bleeding has stopped at this time. Denies any CP, SOB, N/V, constipation, diarrhea, abdominal pain, dysuria, fever, chills, or headaches. Last BM yesterday. Able to ambulate with PT    12 Point ROS negative with the exception of the above    MEDICATIONS  (STANDING):  artificial  tears Solution 1 Drop(s) Both EYES four times a day  ascorbic acid 500 milliGRAM(s) Oral daily  DULoxetine 60 milliGRAM(s) Oral at bedtime  enoxaparin Injectable 40 milliGRAM(s) SubCutaneous every 12 hours  ferrous    sulfate 325 milliGRAM(s) Oral <User Schedule>  lidocaine   4% Patch 1 Patch Transdermal daily  methocarbamol 750 milliGRAM(s) Oral three times a day  nystatin/triamcinolone Ointment 1 Application(s) Topical two times a day  polyethylene glycol 3350 17 Gram(s) Oral daily  pregabalin 200 milliGRAM(s) Oral two times a day    MEDICATIONS  (PRN):  melatonin 3 milliGRAM(s) Oral at bedtime PRN Insomnia      OBJECTIVE:  Vital Signs Last 24 Hrs  T(C): 36.6 (24 Aug 2023 05:23), Max: 36.9 (23 Aug 2023 21:27)  T(F): 97.9 (24 Aug 2023 05:23), Max: 98.4 (23 Aug 2023 21:27)  HR: 99 (24 Aug 2023 05:23) (87 - 99)  BP: 109/64 (24 Aug 2023 05:23) (109/64 - 127/84)  BP(mean): --  RR: 17 (24 Aug 2023 05:23) (17 - 18)  SpO2: 100% (24 Aug 2023 05:23) (100% - 100%)    Parameters below as of 24 Aug 2023 05:23  Patient On (Oxygen Delivery Method): room air        I&O's Summary      PHYSICAL EXAM:  GENERAL: Laying comfortably, cramping episode with discomfort noted after which resolved, NAD  HEENT: NCAT, PERRLA, EOMI, no scleral icterus, no LAD  NECK: No JVD, supple  LUNG: CTABL; No wheezes, crackles, or rhonchi  HEART: RRR; normal S1/S2; No murmurs, rubs, or gallops  ABDOMEN: +BS, soft, nontender, nondistended, no HSM; No rebound, guarding, or rigidity  EXTREMITIES:  trace LE edema b/l, 2+ Peripheral Pulses, No clubbing or cyanosis, L medial knee tenderness to palpation resolved  NEUROLOGY: AOx3, non-focal, strength 5/5 in all extremities, sensation intact  PSYCH: calm and cooperative  SKIN: No rashes or lesions    LABS:    08-22    139  |  103  |  10  ----------------------------<  97  3.9   |  28  |  0.51    Ca    9.0      22 Aug 2023 06:44    TPro  6.2  /  Alb  2.9<L>  /  TBili  <0.2  /  DBili  x   /  AST  30  /  ALT  22  /  AlkPhos  57  08-22                CAPILLARY BLOOD GLUCOSE            RADIOLOGY & ADDITIONAL TESTS:

## 2023-08-24 NOTE — PROGRESS NOTE ADULT - PROBLEM SELECTOR PLAN 2
pt experiencing heavy menses since 8/17 iso amenorrhea, TVUS unremarkable, prolactin elevated, bleeding has stopped   Gyn felt unlikely to be postmenopausal bleeding, will require outpatient workup    Plan  - f/u HgB  - continue to monitor  - f/u outpatient OBGYN

## 2023-08-24 NOTE — PROGRESS NOTE ADULT - ASSESSMENT
Ms Perez is a 44yo woman with a hx of peripheral neuropathy, sickle cell trait, amenorrhea, asthma, and anemia who was a/f fall with inability to get up due to 4 months of ongoing bilateral lower and upper extremity weakness and paresthesias. Homocysteine and prolactin slightly elevated however rest of lab and neuro workup unremarkable thus far. Hospital course c/b episode of heavy menses with drop in Hgb which has since resolved.

## 2023-08-24 NOTE — PROGRESS NOTE ADULT - PROBLEM SELECTOR PLAN 5
- Hypoechoic liver lesions and hepatic steatosis on RUQ US  - Hepatitis panel, HIV (-)  - CT A/P consistent with hepatic steatosis  - AST/ALT (36/26) 8/7/23    Plan  - ensure patient is not on any hepatotoxic meds  - recommend f/u outpatient for MRI abdomen to better visualize liver lesions Hypoechoic liver lesions and hepatic steatosis on RUQ US, Hepatitis panel, HIV (-)  CT A/P consistent with hepatic steatosis  AST/ALT normalized    Plan  - ensure patient is not on any hepatotoxic meds  - recommend f/u outpatient for MRI abdomen to better visualize liver lesions

## 2023-08-24 NOTE — PROGRESS NOTE ADULT - ATTENDING COMMENTS
Awaiting placement  hgb 7.9 today without meaningful change from yesterday and has been stable over several readings; can check labs q3 days.

## 2023-08-24 NOTE — PROGRESS NOTE ADULT - PROBLEM SELECTOR PLAN 3
Ascending weakness and paresthesias, beginning in 3/2023, neuro and rheum workup negative thus far, concerning for conversion disorder  Symptoms stable and unchanging    Plan:  - consider psych f/u outpatient   - rec outpatient movement disorder specialist f/u  - pending AR authorization, possibly Wednesday placement

## 2023-08-25 VITALS
DIASTOLIC BLOOD PRESSURE: 90 MMHG | SYSTOLIC BLOOD PRESSURE: 130 MMHG | RESPIRATION RATE: 17 BRPM | HEART RATE: 78 BPM | TEMPERATURE: 98 F | OXYGEN SATURATION: 98 %

## 2023-08-25 PROCEDURE — 99239 HOSP IP/OBS DSCHRG MGMT >30: CPT | Mod: GC

## 2023-08-25 RX ORDER — DULOXETINE HYDROCHLORIDE 30 MG/1
1 CAPSULE, DELAYED RELEASE ORAL
Qty: 0 | Refills: 0 | DISCHARGE
Start: 2023-08-25

## 2023-08-25 RX ORDER — METHOCARBAMOL 500 MG/1
1 TABLET, FILM COATED ORAL
Qty: 0 | Refills: 0 | DISCHARGE
Start: 2023-08-25

## 2023-08-25 RX ORDER — GABAPENTIN 400 MG/1
1 CAPSULE ORAL
Refills: 0 | DISCHARGE

## 2023-08-25 RX ORDER — NYSTATIN/TRIAMCINOLONE ACET
1 OINTMENT (GRAM) TOPICAL
Qty: 0 | Refills: 0 | DISCHARGE
Start: 2023-08-25

## 2023-08-25 RX ORDER — POLYETHYLENE GLYCOL 3350 17 G/17G
17 POWDER, FOR SOLUTION ORAL
Qty: 0 | Refills: 0 | DISCHARGE
Start: 2023-08-25

## 2023-08-25 RX ADMIN — ENOXAPARIN SODIUM 40 MILLIGRAM(S): 100 INJECTION SUBCUTANEOUS at 09:50

## 2023-08-25 RX ADMIN — METHOCARBAMOL 750 MILLIGRAM(S): 500 TABLET, FILM COATED ORAL at 13:10

## 2023-08-25 RX ADMIN — METHOCARBAMOL 750 MILLIGRAM(S): 500 TABLET, FILM COATED ORAL at 05:55

## 2023-08-25 RX ADMIN — Medication 200 MILLIGRAM(S): at 05:55

## 2023-08-25 RX ADMIN — Medication 1 APPLICATION(S): at 07:36

## 2023-08-25 RX ADMIN — Medication 500 MILLIGRAM(S): at 12:08

## 2023-08-25 NOTE — PROGRESS NOTE ADULT - ATTENDING COMMENTS
No muscle spasms overnight  Awaiting placement No muscle spasms overnight  Awaiting placement  rest as above

## 2023-08-25 NOTE — PROGRESS NOTE ADULT - PROBLEM SELECTOR PLAN 9
Diet: Regular  DVT ppx: lovenox 40 BID  Dispo: Acute rehab (pending referrals)

## 2023-08-25 NOTE — PROGRESS NOTE ADULT - PROBLEM SELECTOR PLAN 2
pt experiencing heavy menses since 8/17 iso amenorrhea, TVUS unremarkable, prolactin elevated, bleeding has stopped   Gyn felt unlikely to be postmenopausal bleeding, will require outpatient workup    Plan  - f/u HgB 7.9, stabilizing   - continue to monitor  - f/u outpatient OBGYN

## 2023-08-25 NOTE — PROGRESS NOTE ADULT - PROVIDER SPECIALTY LIST ADULT
Internal Medicine
Neurology
Neurology
Surgery
Anesthesia
Internal Medicine
Internal Medicine
Neurology
Neurology
Rehab Medicine
Internal Medicine
Internal Medicine
Rehab Medicine
Surgery
Internal Medicine
Neurology
Internal Medicine

## 2023-08-25 NOTE — PROGRESS NOTE ADULT - SUBJECTIVE AND OBJECTIVE BOX
***************************************************************  Yuri Savage, PGY1  Internal Medicine   TEAMS Preferred  ***************************************************************    KAILASH FUENTES  43y Female  MRN: [id number]  [birth year] (44d)    Patient is a 43y old  Female who presents with a chief complaint of Weakness in lower and upper extremities (24 Aug 2023 08:02)      SUBJECTIVE / OVERNIGHT EVENTS:   No acute overnight events.   Pt seen and examined at bedside this AM. Denies any CP, SOB, N/V, constipation, diarrhea, abdominal pain, dysuria, fever, chills, or headaches. Last BM     12 Point ROS negative with the exception of the above    MEDICATIONS  (STANDING):  artificial  tears Solution 1 Drop(s) Both EYES four times a day  ascorbic acid 500 milliGRAM(s) Oral daily  DULoxetine 60 milliGRAM(s) Oral at bedtime  enoxaparin Injectable 40 milliGRAM(s) SubCutaneous every 12 hours  lidocaine   4% Patch 1 Patch Transdermal daily  methocarbamol 750 milliGRAM(s) Oral three times a day  nystatin/triamcinolone Ointment 1 Application(s) Topical two times a day  polyethylene glycol 3350 17 Gram(s) Oral daily  pregabalin 200 milliGRAM(s) Oral two times a day    MEDICATIONS  (PRN):  melatonin 3 milliGRAM(s) Oral at bedtime PRN Insomnia      OBJECTIVE:  Vital Signs Last 24 Hrs  T(C): 36.7 (25 Aug 2023 05:32), Max: 36.7 (24 Aug 2023 22:02)  T(F): 98.1 (25 Aug 2023 05:32), Max: 98.1 (25 Aug 2023 05:32)  HR: 92 (25 Aug 2023 05:32) (87 - 92)  BP: 115/100 (25 Aug 2023 05:32) (115/100 - 118/70)  BP(mean): --  RR: 17 (25 Aug 2023 05:32) (17 - 18)  SpO2: 100% (25 Aug 2023 05:32) (100% - 100%)    Parameters below as of 25 Aug 2023 05:32  Patient On (Oxygen Delivery Method): room air        I&O's Summary      PHYSICAL EXAM:  GENERAL: Laying comfortably, NAD  HEENT: NCAT, PERRLA, EOMI, no scleral icterus, no LAD  NECK: No JVD, supple  LUNG: CTABL; No wheezes, crackles, or rhonchi  HEART: RRR; normal S1/S2; No murmurs, rubs, or gallops  ABDOMEN: +BS, soft, nontender, nondistended, no HSM; No rebound, guarding, or rigidity  EXTREMITIES:  No LE edema b/l, 2+ Peripheral Pulses, No clubbing or cyanosis  NEUROLOGY: AOx3, non-focal, strength 5/5 in all extremities, sensation intact  PSYCH: calm and cooperative  SKIN: No rashes or lesions    LABS:                        7.9    4.87  )-----------( 213      ( 24 Aug 2023 06:26 )             24.6     Auto Eosinophil # x     / Auto Eosinophil % x     / Auto Neutrophil # x     / Auto Neutrophil % x     / BANDS % x        08-24    139  |  103  |  8   ----------------------------<  96  3.7   |  27  |  0.63  08-22    139  |  103  |  10  ----------------------------<  97  3.9   |  28  |  0.51    Ca    8.8      24 Aug 2023 06:26  Ca    9.0      22 Aug 2023 06:44  Phos  4.1     08-24  Mg     1.70     08-24    TPro  6.2  /  Alb  2.9<L>  /  TBili  <0.2  /  DBili  x   /  AST  30  /  ALT  22  /  AlkPhos  57  08-22          Urinalysis Basic - ( 24 Aug 2023 06:26 )    Color: x / Appearance: x / SG: x / pH: x  Gluc: 96 mg/dL / Ketone: x  / Bili: x / Urobili: x   Blood: x / Protein: x / Nitrite: x   Leuk Esterase: x / RBC: x / WBC x   Sq Epi: x / Non Sq Epi: x / Bacteria: x          CAPILLARY BLOOD GLUCOSE            RADIOLOGY & ADDITIONAL TESTS:     ***************************************************************  Yuri Savage, PGY1  Internal Medicine   TEAMS Preferred  ***************************************************************    KAILASH FUENTES  43y Female  MRN: [id number]  [birth year] (44d)    Patient is a 43y old  Female who presents with a chief complaint of Weakness in lower and upper extremities (24 Aug 2023 08:02)      SUBJECTIVE / OVERNIGHT EVENTS:   No acute overnight events.   Pt seen and examined at bedside this AM. Denies any CP, SOB, N/V, constipation, diarrhea, abdominal pain, dysuria, fever, chills, or headaches. Last BM     12 Point ROS negative with the exception of the above    MEDICATIONS  (STANDING):  artificial  tears Solution 1 Drop(s) Both EYES four times a day  ascorbic acid 500 milliGRAM(s) Oral daily  DULoxetine 60 milliGRAM(s) Oral at bedtime  enoxaparin Injectable 40 milliGRAM(s) SubCutaneous every 12 hours  lidocaine   4% Patch 1 Patch Transdermal daily  methocarbamol 750 milliGRAM(s) Oral three times a day  nystatin/triamcinolone Ointment 1 Application(s) Topical two times a day  polyethylene glycol 3350 17 Gram(s) Oral daily  pregabalin 200 milliGRAM(s) Oral two times a day    MEDICATIONS  (PRN):  melatonin 3 milliGRAM(s) Oral at bedtime PRN Insomnia      OBJECTIVE:  Vital Signs Last 24 Hrs  T(C): 36.7 (25 Aug 2023 05:32), Max: 36.7 (24 Aug 2023 22:02)  T(F): 98.1 (25 Aug 2023 05:32), Max: 98.1 (25 Aug 2023 05:32)  HR: 92 (25 Aug 2023 05:32) (87 - 92)  BP: 115/100 (25 Aug 2023 05:32) (115/100 - 118/70)  BP(mean): --  RR: 17 (25 Aug 2023 05:32) (17 - 18)  SpO2: 100% (25 Aug 2023 05:32) (100% - 100%)    Parameters below as of 25 Aug 2023 05:32  Patient On (Oxygen Delivery Method): room air        I&O's Summary      PHYSICAL EXAM:  GENERAL: Laying comfortably, NAD  HEENT: NCAT, PERRLA, EOMI, no scleral icterus, no LAD  NECK: No JVD, supple  LUNG: CTABL; No wheezes, crackles, or rhonchi  HEART: RRR; normal S1/S2; No murmurs, rubs, or gallops  ABDOMEN: +BS, soft, nontender, nondistended, no HSM; No rebound, guarding, or rigidity  EXTREMITIES:  No LE edema b/l, 2+ Peripheral Pulses, No clubbing or cyanosis  NEUROLOGY: AOx3, non-focal, strength 5/5 in all extremities, sensation intact  PSYCH: calm and cooperative  SKIN: No rashes or lesions    LABS:                        7.9    4.87  )-----------( 213      ( 24 Aug 2023 06:26 )             24.6     Auto Eosinophil # x     / Auto Eosinophil % x     / Auto Neutrophil # x     / Auto Neutrophil % x     / BANDS % x        08-24    139  |  103  |  8   ----------------------------<  96  3.7   |  27  |  0.63  08-22    139  |  103  |  10  ----------------------------<  97  3.9   |  28  |  0.51    Ca    8.8      24 Aug 2023 06:26  Ca    9.0      22 Aug 2023 06:44  Phos  4.1     08-24  Mg     1.70     08-24    TPro  6.2  /  Alb  2.9<L>  /  TBili  <0.2  /  DBili  x   /  AST  30  /  ALT  22  /  AlkPhos  57  08-22          Urinalysis Basic - ( 24 Aug 2023 06:26 )    Color: x / Appearance: x / SG: x / pH: x  Gluc: 96 mg/dL / Ketone: x  / Bili: x / Urobili: x   Blood: x / Protein: x / Nitrite: x   Leuk Esterase: x / RBC: x / WBC x   Sq Epi: x / Non Sq Epi: x / Bacteria: x          CAPILLARY BLOOD GLUCOSE            RADIOLOGY & ADDITIONAL TESTS:     ***************************************************************  Yuri Savage, PGY1  Internal Medicine   TEAMS Preferred  ***************************************************************    KAILASH FUENTES  43y Female  MRN: [id number]  [birth year] (44d)    Patient is a 43y old  Female who presents with a chief complaint of Weakness in lower and upper extremities (24 Aug 2023 08:02)      SUBJECTIVE / OVERNIGHT EVENTS:   No acute overnight events.   Pt seen and examined at bedside this AM. Slept better tonight although had cramping overnight. Denies any CP, SOB, N/V, constipation, diarrhea, abdominal pain, dysuria, fever, chills, or headaches. Last BM yesterday    12 Point ROS negative with the exception of the above    MEDICATIONS  (STANDING):  artificial  tears Solution 1 Drop(s) Both EYES four times a day  ascorbic acid 500 milliGRAM(s) Oral daily  DULoxetine 60 milliGRAM(s) Oral at bedtime  enoxaparin Injectable 40 milliGRAM(s) SubCutaneous every 12 hours  lidocaine   4% Patch 1 Patch Transdermal daily  methocarbamol 750 milliGRAM(s) Oral three times a day  nystatin/triamcinolone Ointment 1 Application(s) Topical two times a day  polyethylene glycol 3350 17 Gram(s) Oral daily  pregabalin 200 milliGRAM(s) Oral two times a day    MEDICATIONS  (PRN):  melatonin 3 milliGRAM(s) Oral at bedtime PRN Insomnia      OBJECTIVE:  Vital Signs Last 24 Hrs  T(C): 36.7 (25 Aug 2023 05:32), Max: 36.7 (24 Aug 2023 22:02)  T(F): 98.1 (25 Aug 2023 05:32), Max: 98.1 (25 Aug 2023 05:32)  HR: 92 (25 Aug 2023 05:32) (87 - 92)  BP: 115/100 (25 Aug 2023 05:32) (115/100 - 118/70)  BP(mean): --  RR: 17 (25 Aug 2023 05:32) (17 - 18)  SpO2: 100% (25 Aug 2023 05:32) (100% - 100%)    Parameters below as of 25 Aug 2023 05:32  Patient On (Oxygen Delivery Method): room air        I&O's Summary      PHYSICAL EXAM:  GENERAL: Laying comfortably, NAD  HEENT: NCAT, PERRLA, EOMI, no scleral icterus, no LAD  NECK: No JVD, supple  LUNG: CTABL; No wheezes, crackles, or rhonchi  HEART: RRR; normal S1/S2; No murmurs, rubs, or gallops  ABDOMEN: +BS, soft, nontender, nondistended, no HSM; No rebound, guarding, or rigidity  EXTREMITIES:  +1 LE edema b/l, 2+ Peripheral Pulses, No clubbing or cyanosis  NEUROLOGY: AOx3, non-focal, strength 5/5 in all extremities, sensation intact  PSYCH: calm and cooperative  SKIN: No rashes or lesions    LABS:                        7.9    4.87  )-----------( 213      ( 24 Aug 2023 06:26 )             24.6     Auto Eosinophil # x     / Auto Eosinophil % x     / Auto Neutrophil # x     / Auto Neutrophil % x     / BANDS % x        08-24    139  |  103  |  8   ----------------------------<  96  3.7   |  27  |  0.63  08-22    139  |  103  |  10  ----------------------------<  97  3.9   |  28  |  0.51    Ca    8.8      24 Aug 2023 06:26  Ca    9.0      22 Aug 2023 06:44  Phos  4.1     08-24  Mg     1.70     08-24    TPro  6.2  /  Alb  2.9<L>  /  TBili  <0.2  /  DBili  x   /  AST  30  /  ALT  22  /  AlkPhos  57  08-22          Urinalysis Basic - ( 24 Aug 2023 06:26 )    Color: x / Appearance: x / SG: x / pH: x  Gluc: 96 mg/dL / Ketone: x  / Bili: x / Urobili: x   Blood: x / Protein: x / Nitrite: x   Leuk Esterase: x / RBC: x / WBC x   Sq Epi: x / Non Sq Epi: x / Bacteria: x          CAPILLARY BLOOD GLUCOSE            RADIOLOGY & ADDITIONAL TESTS:

## 2023-08-25 NOTE — PROGRESS NOTE ADULT - REASON FOR ADMISSION
Weakness in lower and upper extremities

## 2023-08-25 NOTE — PROGRESS NOTE ADULT - PROBLEM/PLAN-9
Pt has allergy (rash and itching to Penicillins. Ok to still send med in to pharmacy of choice? Please advise.  Thank You
DISPLAY PLAN FREE TEXT

## 2023-08-25 NOTE — PROGRESS NOTE ADULT - PROBLEM/PLAN-4
DISPLAY PLAN FREE TEXT
DISPLAY PLAN FREE TEXT
NURSE NOTES:

F/C d/c as ordered. tolerated well. Patient verbalized that he will call RN when needing to 
void.
DISPLAY PLAN FREE TEXT

## 2023-08-25 NOTE — PROGRESS NOTE ADULT - PROBLEM SELECTOR PLAN 5
Hypoechoic liver lesions and hepatic steatosis on RUQ US, Hepatitis panel, HIV (-)  CT A/P consistent with hepatic steatosis  AST/ALT normalized    Plan  - ensure patient is not on any hepatotoxic meds  - recommend f/u outpatient for MRI abdomen to better visualize liver lesions

## 2023-08-25 NOTE — PROGRESS NOTE ADULT - ATTENDING SUPERVISION STATEMENT
Resident

## 2023-08-25 NOTE — DISCHARGE NOTE NURSING/CASE MANAGEMENT/SOCIAL WORK - NSDCFUADDAPPT_GEN_ALL_CORE_FT
1. Please follow up with your PCP within 1-2 weeks of discharge to schedule an MRI of your liver. If you do not have a PCP please make an appointment at the [facility] at [facility].   2. Please make an appointment with a Neurologist within 1-2 weeks of discharge. We recommend you see a Movement disorder specialist to better evaluate your current condition. If you do not have a neurologist, please make an appointment at [facility].   3. Please make an appointment with your OBGYN Dr. Jess Chowdhury within 1-2 weeks of discharge to assess your abnormal menstrual bleeding.   4. Please ask your PCP to refer you to an Endocrinologist within 1-2 weeks of discharge to evaluate the cause of your elevated prolactin level

## 2023-08-25 NOTE — DISCHARGE NOTE NURSING/CASE MANAGEMENT/SOCIAL WORK - PATIENT PORTAL LINK FT
You can access the FollowMyHealth Patient Portal offered by [facility] by registering at the following website: [email or web address]. By joining [facility]’s FollowMyHealth portal, you will also be able to view your health information using other applications (apps) compatible with our system.

## 2023-08-25 NOTE — DISCHARGE NOTE NURSING/CASE MANAGEMENT/SOCIAL WORK - NSDCPEFALRISK_GEN_ALL_CORE
For information on Fall & Injury Prevention, visit: [email or web address] OR  [email or web address] OR  https://www.cdc.gov/steadi/patient.html

## 2023-11-29 ENCOUNTER — EMERGENCY (EMERGENCY)
Facility: HOSPITAL | Age: 43
LOS: 1 days | Discharge: HOME | End: 2023-11-29
Attending: EMERGENCY MEDICINE | Admitting: EMERGENCY MEDICINE
Payer: MEDICAID

## 2023-11-29 VITALS
DIASTOLIC BLOOD PRESSURE: 83 MMHG | SYSTOLIC BLOOD PRESSURE: 143 MMHG | TEMPERATURE: 98 F | HEART RATE: 85 BPM | RESPIRATION RATE: 18 BRPM | OXYGEN SATURATION: 98 %

## 2023-11-29 VITALS
TEMPERATURE: 98 F | DIASTOLIC BLOOD PRESSURE: 84 MMHG | RESPIRATION RATE: 16 BRPM | OXYGEN SATURATION: 100 % | SYSTOLIC BLOOD PRESSURE: 145 MMHG | HEART RATE: 86 BPM

## 2023-11-29 DIAGNOSIS — Z90.49 ACQUIRED ABSENCE OF OTHER SPECIFIED PARTS OF DIGESTIVE TRACT: Chronic | ICD-10-CM

## 2023-11-29 PROBLEM — J45.909 UNSPECIFIED ASTHMA, UNCOMPLICATED: Chronic | Status: ACTIVE | Noted: 2023-07-12

## 2023-11-29 PROBLEM — G62.9 POLYNEUROPATHY, UNSPECIFIED: Chronic | Status: ACTIVE | Noted: 2023-07-12

## 2023-11-29 PROBLEM — D57.3 SICKLE-CELL TRAIT: Chronic | Status: ACTIVE | Noted: 2023-07-12

## 2023-11-29 PROBLEM — D64.9 ANEMIA, UNSPECIFIED: Chronic | Status: ACTIVE | Noted: 2023-07-12

## 2023-11-29 LAB
ALBUMIN SERPL ELPH-MCNC: 4.2 G/DL — SIGNIFICANT CHANGE UP (ref 3.3–5)
ALBUMIN SERPL ELPH-MCNC: 4.2 G/DL — SIGNIFICANT CHANGE UP (ref 3.3–5)
ALP SERPL-CCNC: 71 U/L — SIGNIFICANT CHANGE UP (ref 40–120)
ALP SERPL-CCNC: 71 U/L — SIGNIFICANT CHANGE UP (ref 40–120)
ALT FLD-CCNC: 12 U/L — SIGNIFICANT CHANGE UP (ref 4–33)
ALT FLD-CCNC: 12 U/L — SIGNIFICANT CHANGE UP (ref 4–33)
ANION GAP SERPL CALC-SCNC: 12 MMOL/L — SIGNIFICANT CHANGE UP (ref 7–14)
ANION GAP SERPL CALC-SCNC: 12 MMOL/L — SIGNIFICANT CHANGE UP (ref 7–14)
AST SERPL-CCNC: 20 U/L — SIGNIFICANT CHANGE UP (ref 4–32)
AST SERPL-CCNC: 20 U/L — SIGNIFICANT CHANGE UP (ref 4–32)
BASOPHILS # BLD AUTO: 0.04 K/UL — SIGNIFICANT CHANGE UP (ref 0–0.2)
BASOPHILS # BLD AUTO: 0.04 K/UL — SIGNIFICANT CHANGE UP (ref 0–0.2)
BASOPHILS NFR BLD AUTO: 0.8 % — SIGNIFICANT CHANGE UP (ref 0–2)
BASOPHILS NFR BLD AUTO: 0.8 % — SIGNIFICANT CHANGE UP (ref 0–2)
BILIRUB SERPL-MCNC: 0.4 MG/DL — SIGNIFICANT CHANGE UP (ref 0.2–1.2)
BILIRUB SERPL-MCNC: 0.4 MG/DL — SIGNIFICANT CHANGE UP (ref 0.2–1.2)
BUN SERPL-MCNC: 10 MG/DL — SIGNIFICANT CHANGE UP (ref 7–23)
BUN SERPL-MCNC: 10 MG/DL — SIGNIFICANT CHANGE UP (ref 7–23)
CALCIUM SERPL-MCNC: 9.2 MG/DL — SIGNIFICANT CHANGE UP (ref 8.4–10.5)
CALCIUM SERPL-MCNC: 9.2 MG/DL — SIGNIFICANT CHANGE UP (ref 8.4–10.5)
CHLORIDE SERPL-SCNC: 100 MMOL/L — SIGNIFICANT CHANGE UP (ref 98–107)
CHLORIDE SERPL-SCNC: 100 MMOL/L — SIGNIFICANT CHANGE UP (ref 98–107)
CO2 SERPL-SCNC: 27 MMOL/L — SIGNIFICANT CHANGE UP (ref 22–31)
CO2 SERPL-SCNC: 27 MMOL/L — SIGNIFICANT CHANGE UP (ref 22–31)
CREAT SERPL-MCNC: 0.6 MG/DL — SIGNIFICANT CHANGE UP (ref 0.5–1.3)
CREAT SERPL-MCNC: 0.6 MG/DL — SIGNIFICANT CHANGE UP (ref 0.5–1.3)
EGFR: 114 ML/MIN/1.73M2 — SIGNIFICANT CHANGE UP
EGFR: 114 ML/MIN/1.73M2 — SIGNIFICANT CHANGE UP
EOSINOPHIL # BLD AUTO: 0.13 K/UL — SIGNIFICANT CHANGE UP (ref 0–0.5)
EOSINOPHIL # BLD AUTO: 0.13 K/UL — SIGNIFICANT CHANGE UP (ref 0–0.5)
EOSINOPHIL NFR BLD AUTO: 2.7 % — SIGNIFICANT CHANGE UP (ref 0–6)
EOSINOPHIL NFR BLD AUTO: 2.7 % — SIGNIFICANT CHANGE UP (ref 0–6)
GLUCOSE SERPL-MCNC: 92 MG/DL — SIGNIFICANT CHANGE UP (ref 70–99)
GLUCOSE SERPL-MCNC: 92 MG/DL — SIGNIFICANT CHANGE UP (ref 70–99)
HCT VFR BLD CALC: 30.6 % — LOW (ref 34.5–45)
HCT VFR BLD CALC: 30.6 % — LOW (ref 34.5–45)
HGB BLD-MCNC: 9.1 G/DL — LOW (ref 11.5–15.5)
HGB BLD-MCNC: 9.1 G/DL — LOW (ref 11.5–15.5)
IANC: 2.81 K/UL — SIGNIFICANT CHANGE UP (ref 1.8–7.4)
IANC: 2.81 K/UL — SIGNIFICANT CHANGE UP (ref 1.8–7.4)
IMM GRANULOCYTES NFR BLD AUTO: 0.4 % — SIGNIFICANT CHANGE UP (ref 0–0.9)
IMM GRANULOCYTES NFR BLD AUTO: 0.4 % — SIGNIFICANT CHANGE UP (ref 0–0.9)
LYMPHOCYTES # BLD AUTO: 1.52 K/UL — SIGNIFICANT CHANGE UP (ref 1–3.3)
LYMPHOCYTES # BLD AUTO: 1.52 K/UL — SIGNIFICANT CHANGE UP (ref 1–3.3)
LYMPHOCYTES # BLD AUTO: 31.7 % — SIGNIFICANT CHANGE UP (ref 13–44)
LYMPHOCYTES # BLD AUTO: 31.7 % — SIGNIFICANT CHANGE UP (ref 13–44)
MAGNESIUM SERPL-MCNC: 1.7 MG/DL — SIGNIFICANT CHANGE UP (ref 1.6–2.6)
MAGNESIUM SERPL-MCNC: 1.7 MG/DL — SIGNIFICANT CHANGE UP (ref 1.6–2.6)
MCHC RBC-ENTMCNC: 21.8 PG — LOW (ref 27–34)
MCHC RBC-ENTMCNC: 21.8 PG — LOW (ref 27–34)
MCHC RBC-ENTMCNC: 29.7 GM/DL — LOW (ref 32–36)
MCHC RBC-ENTMCNC: 29.7 GM/DL — LOW (ref 32–36)
MCV RBC AUTO: 73.4 FL — LOW (ref 80–100)
MCV RBC AUTO: 73.4 FL — LOW (ref 80–100)
MONOCYTES # BLD AUTO: 0.28 K/UL — SIGNIFICANT CHANGE UP (ref 0–0.9)
MONOCYTES # BLD AUTO: 0.28 K/UL — SIGNIFICANT CHANGE UP (ref 0–0.9)
MONOCYTES NFR BLD AUTO: 5.8 % — SIGNIFICANT CHANGE UP (ref 2–14)
MONOCYTES NFR BLD AUTO: 5.8 % — SIGNIFICANT CHANGE UP (ref 2–14)
NEUTROPHILS # BLD AUTO: 2.81 K/UL — SIGNIFICANT CHANGE UP (ref 1.8–7.4)
NEUTROPHILS # BLD AUTO: 2.81 K/UL — SIGNIFICANT CHANGE UP (ref 1.8–7.4)
NEUTROPHILS NFR BLD AUTO: 58.6 % — SIGNIFICANT CHANGE UP (ref 43–77)
NEUTROPHILS NFR BLD AUTO: 58.6 % — SIGNIFICANT CHANGE UP (ref 43–77)
NRBC # BLD: 0 /100 WBCS — SIGNIFICANT CHANGE UP (ref 0–0)
NRBC # BLD: 0 /100 WBCS — SIGNIFICANT CHANGE UP (ref 0–0)
NRBC # FLD: 0 K/UL — SIGNIFICANT CHANGE UP (ref 0–0)
NRBC # FLD: 0 K/UL — SIGNIFICANT CHANGE UP (ref 0–0)
PHOSPHATE SERPL-MCNC: 3 MG/DL — SIGNIFICANT CHANGE UP (ref 2.5–4.5)
PHOSPHATE SERPL-MCNC: 3 MG/DL — SIGNIFICANT CHANGE UP (ref 2.5–4.5)
PLATELET # BLD AUTO: 300 K/UL — SIGNIFICANT CHANGE UP (ref 150–400)
PLATELET # BLD AUTO: 300 K/UL — SIGNIFICANT CHANGE UP (ref 150–400)
POTASSIUM SERPL-MCNC: 3.4 MMOL/L — LOW (ref 3.5–5.3)
POTASSIUM SERPL-MCNC: 3.4 MMOL/L — LOW (ref 3.5–5.3)
POTASSIUM SERPL-SCNC: 3.4 MMOL/L — LOW (ref 3.5–5.3)
POTASSIUM SERPL-SCNC: 3.4 MMOL/L — LOW (ref 3.5–5.3)
PROT SERPL-MCNC: 7.9 G/DL — SIGNIFICANT CHANGE UP (ref 6–8.3)
PROT SERPL-MCNC: 7.9 G/DL — SIGNIFICANT CHANGE UP (ref 6–8.3)
RBC # BLD: 4.17 M/UL — SIGNIFICANT CHANGE UP (ref 3.8–5.2)
RBC # BLD: 4.17 M/UL — SIGNIFICANT CHANGE UP (ref 3.8–5.2)
RBC # FLD: 17.5 % — HIGH (ref 10.3–14.5)
RBC # FLD: 17.5 % — HIGH (ref 10.3–14.5)
SODIUM SERPL-SCNC: 139 MMOL/L — SIGNIFICANT CHANGE UP (ref 135–145)
SODIUM SERPL-SCNC: 139 MMOL/L — SIGNIFICANT CHANGE UP (ref 135–145)
TSH SERPL-MCNC: 1.14 UIU/ML — SIGNIFICANT CHANGE UP (ref 0.27–4.2)
TSH SERPL-MCNC: 1.14 UIU/ML — SIGNIFICANT CHANGE UP (ref 0.27–4.2)
VIT B12 SERPL-MCNC: 315 PG/ML — SIGNIFICANT CHANGE UP (ref 200–900)
VIT B12 SERPL-MCNC: 315 PG/ML — SIGNIFICANT CHANGE UP (ref 200–900)
WBC # BLD: 4.8 K/UL — SIGNIFICANT CHANGE UP (ref 3.8–10.5)
WBC # BLD: 4.8 K/UL — SIGNIFICANT CHANGE UP (ref 3.8–10.5)
WBC # FLD AUTO: 4.8 K/UL — SIGNIFICANT CHANGE UP (ref 3.8–10.5)
WBC # FLD AUTO: 4.8 K/UL — SIGNIFICANT CHANGE UP (ref 3.8–10.5)

## 2023-11-29 PROCEDURE — 99284 EMERGENCY DEPT VISIT MOD MDM: CPT

## 2023-11-29 RX ORDER — DULOXETINE HYDROCHLORIDE 30 MG/1
60 CAPSULE, DELAYED RELEASE ORAL DAILY
Refills: 0 | Status: DISCONTINUED | OUTPATIENT
Start: 2023-11-29 | End: 2023-12-03

## 2023-11-29 RX ORDER — DULOXETINE HYDROCHLORIDE 30 MG/1
1 CAPSULE, DELAYED RELEASE ORAL
Qty: 30 | Refills: 0
Start: 2023-11-29 | End: 2023-12-28

## 2023-11-29 RX ADMIN — Medication 300 MILLIGRAM(S): at 15:49

## 2023-11-29 RX ADMIN — DULOXETINE HYDROCHLORIDE 60 MILLIGRAM(S): 30 CAPSULE, DELAYED RELEASE ORAL at 15:49

## 2023-11-29 NOTE — ED PROVIDER NOTE - NSFOLLOWUPINSTRUCTIONS_ED_ALL_ED_FT
Rest, drink plenty of fluids.  Advance activity as tolerated.  Continue all previously prescribed medications as directed.  Follow up with your primary care physician in 48-72 hours- bring copies of your results.  Return to the ER for worsening or persistent symptoms, and/or ANY NEW OR CONCERNING SYMPTOMS. If you have issues obtaining follow up, please call: [phone] to obtain a doctor or specialist who takes your insurance in your area.  You may call [phone] to make an appointment with the internal medicine clinic.

## 2023-11-29 NOTE — ED ADULT NURSE NOTE - OBJECTIVE STATEMENT
Patient received in stretcher. AOX4. Respirations even and unlabored. Spontaneous movement of all extremities noted. Presents to ER c/o "my neuropathy is really bad today" Patient reports that she was discharged from rehab about a week ago and has not been able to  medications- unable to specify reason why. Patient reports worsening neuropathy to feet and hands. IV placed. Labs drawn. Patient states " i'm not taking that cymbalta without the lyrica becaise then it just doesn't work, MD aware Comfort and safety maintained. All current care needs met. Care plan continued Julianne JAVED

## 2023-11-29 NOTE — ED ADULT TRIAGE NOTE - CHIEF COMPLAINT QUOTE
pt with hx neuropathy, chronic back pain, c/o of increased back pain radiating to bilateral lower extremities and tingling to bilateral hands as well, pt ambulated with walker, denies any trauma

## 2023-11-29 NOTE — PROVIDER CONTACT NOTE (OTHER) - ASSESSMENT
Received a call from KOLE Wright, patient requesting information on assistance at home with ADL's.  Writer met with patient and provided patient with Medicaid Conflict Free Evaluation number and a private hire aide list.  Patient had no further concerns.  CM will remain available for any further needs.

## 2023-11-29 NOTE — ED ADULT NURSE NOTE - PAIN RATING/NUMBER SCALE (0-10): ACTIVITY
From: Tali Cox  To: Oneida El NP  Sent: 9/11/2019 7:07 AM CDT  Subject: Other    [facility]  [facility] here.  All is going well.    Have a great day!  Tali Cox   10 (severe pain)

## 2023-11-29 NOTE — ED PROVIDER NOTE - NS ED ATTENDING STATEMENT MOD
This was a shared visit with the TIANNA. I reviewed and verified the documentation and independently performed the documented:

## 2023-11-29 NOTE — ED PROVIDER NOTE - PATIENT PORTAL LINK FT
You can access the FollowMyHealth Patient Portal offered by [facility] by registering at the following website: [email or web address]. By joining [facility]’s FollowMyHealth portal, you will also be able to view your health information using other applications (apps) compatible with our system.

## 2023-11-29 NOTE — ED PROVIDER NOTE - MUSCULOSKELETAL MINIMAL EXAM
no midline tenderness, noted, neurovascular intact. sphincter tone intact. full ROM of the lumbar no obvious deformities, 5/5 strength in the lower extremities./atraumatic

## 2023-11-29 NOTE — ED PROVIDER NOTE - CLINICAL SUMMARY MEDICAL DECISION MAKING FREE TEXT BOX
This is a 43-year-old female with a past medical history of neuropathy currently not taking any medications, obesity sickle cell trait, asthma, anemia presented to the ED complaining having paresthesias in the upper and lower extremities.  Parasthesias- labs, vitamins, patient had MRIs done a few months ago which did not show and signs of severe hernias or cord compression. will recommend neuro, spine and pain management out patient. Will contact  as well. Pt requesting Pain meds at home.

## 2023-11-29 NOTE — ED ADULT NURSE NOTE - NSFALLUNIVINTERV_ED_ALL_ED
Bed/Stretcher in lowest position, wheels locked, appropriate side rails in place/Call bell, personal items and telephone in reach/Instruct patient to call for assistance before getting out of bed/chair/stretcher/Non-slip footwear applied when patient is off stretcher/[place] to call system/Physically safe environment - no spills, clutter or unnecessary equipment/Purposeful proactive rounding/Room/bathroom lighting operational, light cord in reach

## 2023-11-29 NOTE — ED PROVIDER NOTE - ATTENDING APP SHARED VISIT CONTRIBUTION OF CARE
Attending note:   After face to face evaluation of this patient, I concur with above noted hx, pe, and care plan for this patient.  Estevez: 43-year-old female with history of neuropathy and sickle cell trait.  Patient also has history of asthma.  Patient has chronic neuropathy and had recent hospitalization few months ago where multiple MRIs and other tests were performed with no significant diagnosis.  Patient was in rehab getting Lyrica and Cymbalta for pain.  Patient was discharged few weeks ago without these medications and states that numbness and tingling has been worsening.  Patient notes paresthesias down both legs and also notes that symptoms she has tingling in the left palm of hand.  Patient states she is having a hard time getting around at home and has been using a walker.  Patient came to the ED now hoping for answers.  On exam patient is ambulating with a walker at her baseline.  Vitals are stable.  There is no midline spinal tenderness noted.  There is no paraspinal tenderness noted.  Patient has no saddle anesthesia and there is good rectal tone present.  Patient is able to move lower extremities.  Patient has decreased sensation in the right leg but also left hand.  Patient also notes that in the back of the neck there is decreased sensation on the right side of neck and upper chest.  Rest of patient's physical exam is unremarkable.  Will check basic labs including electrolytes to see if any new changes but otherwise will patient patient back on her previous pain medication.  Patient is unlikely to benefit from admission or repeat rehab which she does not want to do anyway.  Would recommend that patient get PT OT continues with outpatient and case management to see if possible to arrange.

## 2023-11-29 NOTE — ED ADULT NURSE NOTE - NSSEPSISSUSPECTED_ED_A_ED
Caller would like to discuss an/a Appointment for today. Writer advised caller of callback within 24-72 hours. Patient Name: Shyla Butt  Caller Name: Viv Garcia  Name of Facility: na  Callback Number: verified  Best Availability: today  Can A Detailed Message Be left? yes  Fax Number: na  Additional Info: Patient missed his 11:00 today and is wants to come today yet. Please advise.   Did you confirm the message with the caller?: yes    Thank you,  Rajesh Holley
Left Message to return call to clinic; needs to schedule next available at either [facility] or [facility].
Patient called back and scheduled an appointment for the next available.       Patient Name: Hussein Dumont Number: [phone]    Did you confirm the message with the caller?: yes    Thank you,  Natasha Degroot
Per provider patient will have to schedule for next available as no availability today. Please call and schedule.
No

## 2023-11-29 NOTE — ED PROVIDER NOTE - OBJECTIVE STATEMENT
This is a 43-year-old female with a past medical history of neuropathy currently not taking any medications, obesity sickle cell trait, asthma, anemia presented to the ED complaining having paresthesias in the upper and lower extremities.  She has been having symptoms for several months to years now.  She was seen multiple times in July where she had multiple workups done without any significant findings.  She had MRIs which were also within normal limits.  She was in rehab rotation where she was getting physical therapy however her left the physical therapist because she felt like it was not helping.  She denies having any chest pain shortness of breath trouble breathing fever chills body aches denies having any loss of bowel movements urinary or bladder incontinence denies having any dysuria hematuria urinary urgency or frequency.  She states that she was taking Lyrica and Cymbalta however has not been taking it for the past few weeks she has not been seeing any pain management doctors or neurologist.  She states that she is able to walk at her baseline with a walker.  She denies having any new trauma or falls.

## 2024-07-08 NOTE — OCCUPATIONAL THERAPY INITIAL EVALUATION ADULT - PERTINENT HX OF CURRENT PROBLEM, REHAB EVAL
Pt is a 43 year old female with hx of peripheral neuropathy (diagnosed in 3/2023), sickle cell trait, asthma, and anemia who presented to [facility] on 7/12/23 s/p fall with inability to get up due to 4 months of ongoing bilateral lower and upper extremity weakness and paresthesias. EMG unremarkable. MRI head, C/T/L spine with sedation unsuccessful as pt could not tolerate. Homocysteine slightly elevated however rest of lab workup unremarkable so far. CT AP showed findings consistent with hepatic steatosis, mild contrast allergy resolved. MRI Head/Spine to be performed under heavier sedation.
Statement Selected

## 2025-04-10 NOTE — PATIENT PROFILE ADULT - CAREGIVER PHONE NUMBER
1 year visit  CMP and Lipid  Continue same medications and treatments.   Patient educated on proper medication use.   Patient educated on risk factor modification.   Please bring any lab results from other providers / physicians to your next appointment.     Please bring all medicines, vitamins, and herbal supplements with you when you come to the office.     Prescriptions will not be filled unless you are compliant with your follow up appointments or have a follow up appointment scheduled as per instruction of your physician. Refills should be requested at the time of your visit.    
[phone]

## 2025-05-24 NOTE — ED PROVIDER NOTE - ATTENDING CONTRIBUTION TO CARE
no pain, swelling or deformity of joints
I performed a face-to-face evaluation of the patient and performed a history and physical examination along with the resident or ACP, and/or medical student above.  I agree with the history and physical examination as documented by the resident or ACP, and/or medical student above.  Dave:  Gen: NAD, AOx3, non-toxic appearing, unable to ambulate  Head: NCAT  HEENT: EOMI, PEERLA, normal conjunctiva, tongue midline, oral mucosa moist  Lung: CTAB, no respiratory distress, no wheezes/rhonchi/rales B/L, speaking in full sentences  CV: RRR, no murmurs, rubs or gallops  Abd: soft, NT, ND, no guarding, no rigidity, no rebound tenderness, no CVA tenderness   MSK: no visible deformities, strength 4/5 bilaterally against resistance   (chaperoned by Dr. Acosta): good rectal tone, no perineal anesthesia  Neuro: No focal sensory deficits; 4/5 strength b/l LEs  Skin: Warm, well perfused, no rash, no leg swelling  Psych: normal affect, calm

## (undated) DEVICE — VENODYNE/SCD SLEEVE CALF MEDIUM

## (undated) DEVICE — SUT MONOCRYL 4-0 18" P-3 UNDYED

## (undated) DEVICE — WARMING BLANKET FULL ADULT

## (undated) DEVICE — BASIN SET DOUBLE

## (undated) DEVICE — GLV 7.5 PROTEXIS (WHITE)

## (undated) DEVICE — LABELS BLANK W PEN

## (undated) DEVICE — PACK MINOR WITH LAP

## (undated) DEVICE — DRAPE LAPAROTOMY TRANSVERSE

## (undated) DEVICE — SOL IRR POUR NS 0.9% 500ML

## (undated) DEVICE — LIJ/LIA-ESU FORCEFX F7H55667A: Type: DURABLE MEDICAL EQUIPMENT

## (undated) DEVICE — SUT VICRYL 2-0 27" SH UNDYED

## (undated) DEVICE — DRAPE THYROID 77" X 123"

## (undated) DEVICE — ELCTR BOVIE PENCIL SMOKE EVACUATION

## (undated) DEVICE — ELCTR GROUNDING PAD ADULT COVIDIEN

## (undated) DEVICE — SYR LUER LOK 20CC

## (undated) DEVICE — DRSG STERISTRIPS 0.5 X 4"

## (undated) DEVICE — SUT VICRYL 3-0 27" SH UNDYED

## (undated) DEVICE — POSITIONER STRAP ARMBOARD VELCRO TS-30